# Patient Record
Sex: MALE | Race: BLACK OR AFRICAN AMERICAN | HISPANIC OR LATINO | Employment: UNEMPLOYED | URBAN - METROPOLITAN AREA
[De-identification: names, ages, dates, MRNs, and addresses within clinical notes are randomized per-mention and may not be internally consistent; named-entity substitution may affect disease eponyms.]

---

## 2017-06-30 ENCOUNTER — ALLSCRIPTS OFFICE VISIT (OUTPATIENT)
Dept: OTHER | Facility: OTHER | Age: 52
End: 2017-06-30

## 2017-06-30 DIAGNOSIS — I10 ESSENTIAL (PRIMARY) HYPERTENSION: ICD-10-CM

## 2017-06-30 DIAGNOSIS — R63.5 ABNORMAL WEIGHT GAIN: ICD-10-CM

## 2017-06-30 DIAGNOSIS — R94.5 ABNORMAL RESULTS OF LIVER FUNCTION STUDIES: ICD-10-CM

## 2017-06-30 DIAGNOSIS — E78.5 HYPERLIPIDEMIA: ICD-10-CM

## 2017-06-30 DIAGNOSIS — M48.061 SPINAL STENOSIS OF LUMBAR REGION: ICD-10-CM

## 2017-06-30 DIAGNOSIS — Z13.89 ENCOUNTER FOR SCREENING FOR OTHER DISORDER: ICD-10-CM

## 2017-06-30 DIAGNOSIS — E66.9 OBESITY: ICD-10-CM

## 2017-06-30 DIAGNOSIS — M54.50 LOW BACK PAIN: ICD-10-CM

## 2017-06-30 DIAGNOSIS — M10.9 GOUT: ICD-10-CM

## 2017-07-11 ENCOUNTER — TRANSCRIBE ORDERS (OUTPATIENT)
Dept: LAB | Facility: HOSPITAL | Age: 52
End: 2017-07-11

## 2017-07-11 ENCOUNTER — APPOINTMENT (OUTPATIENT)
Dept: LAB | Facility: HOSPITAL | Age: 52
End: 2017-07-11
Payer: COMMERCIAL

## 2017-07-11 DIAGNOSIS — I10 ESSENTIAL (PRIMARY) HYPERTENSION: ICD-10-CM

## 2017-07-11 DIAGNOSIS — M48.061 SPINAL STENOSIS OF LUMBAR REGION: ICD-10-CM

## 2017-07-11 DIAGNOSIS — E78.5 HYPERLIPIDEMIA: ICD-10-CM

## 2017-07-11 DIAGNOSIS — E66.9 OBESITY: ICD-10-CM

## 2017-07-11 DIAGNOSIS — R94.5 ABNORMAL RESULTS OF LIVER FUNCTION STUDIES: ICD-10-CM

## 2017-07-11 DIAGNOSIS — M54.50 LOW BACK PAIN: ICD-10-CM

## 2017-07-11 DIAGNOSIS — R63.5 ABNORMAL WEIGHT GAIN: ICD-10-CM

## 2017-07-11 DIAGNOSIS — M10.9 GOUT: ICD-10-CM

## 2017-07-11 DIAGNOSIS — Z13.89 ENCOUNTER FOR SCREENING FOR OTHER DISORDER: ICD-10-CM

## 2017-07-11 LAB
ALBUMIN SERPL BCP-MCNC: 3.9 G/DL (ref 3.5–5)
ALP SERPL-CCNC: 107 U/L (ref 46–116)
ALT SERPL W P-5'-P-CCNC: 72 U/L (ref 12–78)
ANION GAP SERPL CALCULATED.3IONS-SCNC: 6 MMOL/L (ref 4–13)
AST SERPL W P-5'-P-CCNC: 33 U/L (ref 5–45)
BASOPHILS # BLD AUTO: 0.01 THOUSANDS/ΜL (ref 0–0.1)
BASOPHILS NFR BLD AUTO: 0 % (ref 0–1)
BILIRUB SERPL-MCNC: 0.48 MG/DL (ref 0.2–1)
BUN SERPL-MCNC: 16 MG/DL (ref 5–25)
CALCIUM SERPL-MCNC: 9.1 MG/DL (ref 8.3–10.1)
CHLORIDE SERPL-SCNC: 101 MMOL/L (ref 100–108)
CHOLEST SERPL-MCNC: 239 MG/DL (ref 50–200)
CO2 SERPL-SCNC: 31 MMOL/L (ref 21–32)
CREAT SERPL-MCNC: 0.91 MG/DL (ref 0.6–1.3)
EOSINOPHIL # BLD AUTO: 0.24 THOUSAND/ΜL (ref 0–0.61)
EOSINOPHIL NFR BLD AUTO: 3 % (ref 0–6)
ERYTHROCYTE [DISTWIDTH] IN BLOOD BY AUTOMATED COUNT: 13.2 % (ref 11.6–15.1)
EST. AVERAGE GLUCOSE BLD GHB EST-MCNC: 128 MG/DL
GFR SERPL CREATININE-BSD FRML MDRD: >60 ML/MIN/1.73SQ M
GLUCOSE P FAST SERPL-MCNC: 104 MG/DL (ref 65–99)
HBA1C MFR BLD: 6.1 % (ref 4.2–6.3)
HCT VFR BLD AUTO: 48.5 % (ref 36.5–49.3)
HDLC SERPL-MCNC: 30 MG/DL (ref 40–60)
HGB BLD-MCNC: 17.1 G/DL (ref 12–17)
LDLC SERPL DIRECT ASSAY-MCNC: 140 MG/DL (ref 0–100)
LYMPHOCYTES # BLD AUTO: 2.07 THOUSANDS/ΜL (ref 0.6–4.47)
LYMPHOCYTES NFR BLD AUTO: 25 % (ref 14–44)
MCH RBC QN AUTO: 31.8 PG (ref 26.8–34.3)
MCHC RBC AUTO-ENTMCNC: 35.3 G/DL (ref 31.4–37.4)
MCV RBC AUTO: 90 FL (ref 82–98)
MONOCYTES # BLD AUTO: 0.93 THOUSAND/ΜL (ref 0.17–1.22)
MONOCYTES NFR BLD AUTO: 11 % (ref 4–12)
NEUTROPHILS # BLD AUTO: 5.12 THOUSANDS/ΜL (ref 1.85–7.62)
NEUTS SEG NFR BLD AUTO: 61 % (ref 43–75)
NRBC BLD AUTO-RTO: 0 /100 WBCS
PLATELET # BLD AUTO: 203 THOUSANDS/UL (ref 149–390)
PMV BLD AUTO: 10.8 FL (ref 8.9–12.7)
POTASSIUM SERPL-SCNC: 4 MMOL/L (ref 3.5–5.3)
PROT SERPL-MCNC: 7.7 G/DL (ref 6.4–8.2)
RBC # BLD AUTO: 5.37 MILLION/UL (ref 3.88–5.62)
SODIUM SERPL-SCNC: 138 MMOL/L (ref 136–145)
TRIGL SERPL-MCNC: 436 MG/DL
URATE SERPL-MCNC: 8.7 MG/DL (ref 4.2–8)
WBC # BLD AUTO: 8.38 THOUSAND/UL (ref 4.31–10.16)

## 2017-07-11 PROCEDURE — 85025 COMPLETE CBC W/AUTO DIFF WBC: CPT

## 2017-07-11 PROCEDURE — 80053 COMPREHEN METABOLIC PANEL: CPT

## 2017-07-11 PROCEDURE — 83036 HEMOGLOBIN GLYCOSYLATED A1C: CPT

## 2017-07-11 PROCEDURE — 83721 ASSAY OF BLOOD LIPOPROTEIN: CPT

## 2017-07-11 PROCEDURE — 36415 COLL VENOUS BLD VENIPUNCTURE: CPT

## 2017-07-11 PROCEDURE — 80061 LIPID PANEL: CPT

## 2017-07-11 PROCEDURE — 84550 ASSAY OF BLOOD/URIC ACID: CPT

## 2017-11-20 ENCOUNTER — HOSPITAL ENCOUNTER (EMERGENCY)
Facility: HOSPITAL | Age: 52
Discharge: HOME | End: 2017-11-20
Attending: EMERGENCY MEDICINE | Admitting: EMERGENCY MEDICINE
Payer: COMMERCIAL

## 2017-11-20 VITALS
OXYGEN SATURATION: 97 % | RESPIRATION RATE: 18 BRPM | HEART RATE: 99 BPM | DIASTOLIC BLOOD PRESSURE: 115 MMHG | SYSTOLIC BLOOD PRESSURE: 159 MMHG

## 2017-11-20 DIAGNOSIS — M10.9 ACUTE GOUT: Primary | ICD-10-CM

## 2017-11-20 DIAGNOSIS — M25.531 RIGHT WRIST PAIN: ICD-10-CM

## 2017-11-20 DIAGNOSIS — M25.521 RIGHT ELBOW PAIN: ICD-10-CM

## 2017-11-20 DIAGNOSIS — M25.571 RIGHT ANKLE PAIN: ICD-10-CM

## 2017-11-20 DIAGNOSIS — M79.641 RIGHT HAND PAIN: ICD-10-CM

## 2017-11-20 PROCEDURE — 99283 EMERGENCY DEPT VISIT LOW MDM: CPT

## 2017-11-20 RX ORDER — IBUPROFEN 400 MG/1
800 TABLET ORAL EVERY 8 HOURS PRN
Qty: 20 TABLET | Refills: 0 | Status: SHIPPED | OUTPATIENT
Start: 2017-11-20 | End: 2018-03-22

## 2017-11-20 RX ORDER — COLCHICINE 0.6 MG/1
0.6 TABLET ORAL ONCE
Qty: 1 TABLET | Refills: 0 | Status: SHIPPED | OUTPATIENT
Start: 2017-11-20 | End: 2018-03-22

## 2017-11-20 RX ORDER — COLCHICINE 0.6 MG/1
1.2 TABLET ORAL ONCE
Status: COMPLETED | OUTPATIENT
Start: 2017-11-20 | End: 2017-11-20

## 2017-11-20 RX ADMIN — COLCHICINE 1.2 MG: 0.6 TABLET, FILM COATED ORAL at 21:58

## 2017-11-21 NOTE — DISCHARGE INSTRUCTIONS
Gota   LO QUE NECESITA SABER:   La gota es un tipo de artritis que causa un intenso dolor y rigidez en las articulaciones  El dolor de gota aguda empieza repentinamente, se empeora rápidamente y cesa por jacobo Yair Slovak  La gota aguda puede llegar a ser Bhumika Cutter y causar daño permanente en las articulaciones  INSTRUCCIONES SOBRE EL LIA HOSPITALARIA:   Regrese a la phillip de emergencias si:   · Usted tiene dolor intenso en jaida o más articulaciones que no puede tolerar  · Usted tiene fiebre o enrojecimiento que se propaga más allá del área de la articulación  Pregúntele a jacobo Tierney Punch vitaminas y minerales son adecuados para usted  · Usted tiene síntomas nuevos, nu sarpullido, después de comenzar el tratamiento para la gota  · El dolor e inflamación en jacobo articulación no se desaparece, aún después del tratamiento  · Usted no está orinando tanto o con la frecuencia con que suele hacerlo  · Usted tiene problemas para oleksandr mayi medicamentos para la gota  · Usted tiene preguntas o inquietudes acerca de jacobo condición o cuidado  Medicamentos:  Es posible que usted necesite alguno de los siguientes:  · Un medicamento con receta para el dolor  podrían ser Urvashi Cammie  Pregunte al médico cómo debe oleksandr lara medicamento de forma zapien  Algunos medicamentos recetados para el dolor contienen acetaminofén  No tome otros medicamentos que contengan acetaminofén sin consultarlo con jacobo médico  Demasiado acetaminofeno puede causar daño al hígado  Los medicamentos recetados para el dolor podrían causar estreñimiento  Pregunte a jacobo médico nu prevenir o tratar estreñimiento  · AINEs (Analgésicos antiinflamatorios no esteroides) nu el ibuprofeno, ayudan a disminuir la inflamación, el dolor y la [place]  [place] medicamento esta disponible con o sin jaida receta médica  Los AINEs pueden causar sangrado estomacal o problemas renales en ciertas personas   Si usted gabby un medicamento anticoagulante, siempre pregúntele a jacobo médico si los John Li son seguros para usted  Siempre radha la etiqueta de lara medicamento y [place] instrucciones  · El medicamento para la gota  disminuye el dolor e inflamación en las articulaciones  También se puede administrar para prevenir nuevos ataques de gota  · Esteroides  reducen la inflamación y pueden ayudarlo con la rigidez y el dolor en mayi articulaciones nate los ataques de [place]  · El medicamento para ácido úrico  puede administrarse para reducir la cantidad de ácido úrico que jacobo cuerpo produce  Algunos medicamentos pueden ayudar a eliminar más ácido úrico al [place]  · [place] mayi medicamentos nu se le haya indicado  Consulte con jacobo médico si usted william que jacobo medicamento no le está ayudando o si presenta efectos secundarios  Infórmele si es alérgico a cualquier medicamento  Mantenga jaida lista actualizada de los [place], las vitaminas y los productos herbales que gabby  Incluya los siguientes datos de los medicamentos: cantidad, frecuencia y motivo de administración  Traiga con usted la lista o los envases de la píldoras a mayi citas de seguimiento  Lleve la lista de los medicamentos con usted en fela de jaida emergencia  Acuda a mayi consultas de control con jacobo médico según le indicaron  Anote mayi preguntas para que se acuerde de hacerlas nate mayi visitas  Controle la gota:   · Descanse jacobo articulación adolorida para que pueda recuperarse  Jacobo médico podría recomendarle que use muletas o un caminador si la articulación afectada está en jaida pierna  · Aplique hielo a jacobo articulación  El hielo disminuye el dolor y la inflamación  Use un paquete de hielo o ponga hielo molido dentro de [employer]  [place] la bolsa o el paquete de hielo con jaida toalla antes de aplicarla en la articulación adolorida  Aplique hielo nate 15 a 20 minutos por hora o según indicaciones  · Eleve jacobo articulación  [place] le ayudará a disminuir la inflamación y el dolor   Eleve jacobo articulación por encima del nivel del corazón con la frecuencia que pueda  [place] jacobo articulación adolorida sobre almohadas para mantenerla cómodamente por encima del nivel del corazón  · Vaya a fisioterapia según le indicaron  Un fisioterapeuta le puede enseñar ejercicios para mejorar la flexibilidad y el rango de [place]  Prevenga ataques de gota:   · No consuma alimentos altos en purinas  Estos alimentos incluyen:sunitha, mariscos, espárragos, espinacas, coliflor, y algunos tipos de legumbres  Puede que los [employer] indiquen que coma más productos lácteos bajos en grasa, nu el yogur  Los productos lácteos pueden disminuir el riesgo de presentar ataques de [place]  La vitamina C y el café también puedan ayudar  Jacobo médico o un dietista puede ayudarle a crear un plan de comidas  · [employer] líquidos nu se le indique  Los líquidos nu el agua ayudan a eliminar el ácido úrico del cuerpo  Pregunte cuánto líquido debe oleksandr cada día y cuáles líquidos son los más adecuados para usted  · Controle jacobo peso  Bajar de peso puede disminuir la cantidad de ácido úrico en jacobo cuerpo  El ejercicio lo puede ayudar con la pérdida de [place]  Consulte con jacobo médico acerca de los ejercicios adecuados para usted  · Controle mayi niveles de azúcar en la ismael si usted tiene diabetes  Mantenga el nivel de azúcar en jacobo ismael en un margen normal  [place] puede ayudar a prevenir ataques de gota  · Limite o no consuma bebidas alcohólicas, según indicaciones  El alcohol puede provocar un ataque de [place]  El alcohol también aumenta el riesgo de presentar deshidratación  Pregúntele a jacobo médico si usted puede oleksandr alcohol  © 2017 [facility] Information is for End User's use only and may not be sold, redistributed or otherwise used for commercial purposes  All illustrations and images included in CareNotes® are the copyrighted property of A D A M , Inc  or Steve Jc    Esta información es sólo para uso en educación  Jacobo intención no es darle un consejo médico sobre enfermedades o tratamientos  Colsulte con jacobo Bishop Cranker farmacéutico antes de seguir cualquier régimen médico para saber si es seguro y efectivo para usted  Gout   WHAT YOU NEED TO KNOW:   Gout is a form of arthritis that causes severe joint pain, redness, swelling, and stiffness  Acute gout pain starts suddenly, gets worse quickly, and stops on its own  Acute gout can become chronic and cause permanent damage to the joints  DISCHARGE INSTRUCTIONS:   Return to the emergency department if:   · You have severe pain in one or more of your joints that you cannot tolerate  · You have a fever or redness that spreads beyond the joint area  Contact your healthcare provider if:   · You have new symptoms, such as a rash, after you start gout treatment  · Your joint pain and swelling do not go away, even after treatment  · You are not urinating as much or as often as you usually do  · You have trouble taking your gout medicines  · You have questions or concerns about your condition or care  Medicines: You may need any of the following:  · Prescription pain medicine  may be given  Ask your healthcare provider how to take this medicine safely  Some prescription pain medicines contain acetaminophen  Do not take other medicines that contain acetaminophen without talking to your healthcare provider  Too much acetaminophen may cause liver damage  Prescription pain medicine may cause constipation  Ask your healthcare provider how to prevent or treat constipation  · NSAIDs , such as ibuprofen, help decrease swelling, pain, and fever  This medicine is available with or without a doctor's order  NSAIDs can cause stomach bleeding or kidney problems in certain people  If you take blood thinner medicine, always ask your healthcare provider if NSAIDs are safe for you  Always read the medicine label and follow directions      · Gout medicine decreases joint pain and swelling  It may also be given to prevent new gout attacks  · Steroids  reduce inflammation and can help your joint stiffness and pain during gout attacks  · Uric acid medicine  may be given to reduce the amount of uric acid your body makes  Some medicines may help you pass more uric acid when you urinate  · Take your medicine as directed  Contact your healthcare provider if you think your medicine is not helping or if you have side effects  Tell him or her if you are allergic to any medicine  Keep a list of the medicines, vitamins, and herbs you take  Include the amounts, and when and why you take them  Bring the list or the pill bottles to follow-up visits  Carry your medicine list with you in case of an emergency  Follow up with your healthcare provider as directed:  Write down your questions so you remember to ask them during your visits  Manage gout:   · Rest your painful joint so it can heal   Your healthcare provider may recommend crutches or a walker if the affected joint is in a leg  · Apply ice to your joint  Ice decreases pain and swelling  Use an ice pack, or put crushed ice in a plastic bag  Cover the ice pack or bag with a towel before you apply it to your painful joint  Apply ice for 15 to 20 minutes every hour, or as directed  · Elevate your joint  Elevation helps reduce swelling and pain  Raise your joint above the level of your heart as often as you can  Prop your painful joint on pillows to keep it above your heart comfortably  · Go to physical therapy if directed  A physical therapist can teach you exercises to improve flexibility and range of motion  Prevent gout attacks:   · Do not eat high-purine foods  These foods include meats, seafood, asparagus, spinach, cauliflower, and some types of beans  Healthcare providers may tell you to eat more low-fat milk products, such as yogurt  Milk products may decrease your risk for gout attacks   Vitamin C and coffee may also help  Your healthcare provider or dietitian can help you create a meal plan  · Drink more liquids as directed  Liquids such as water help remove uric acid from your body  Ask how much liquid to drink each day and which liquids are best for you  · Manage your weight  Weight loss may decrease the amount of uric acid in your body  Exercise can help you lose weight  Talk to your healthcare provider about the best exercises for you  · Control your blood sugar level if you have diabetes  Keep your blood sugar level in a normal range  This can help prevent gout attacks  · Limit or do not drink alcohol as directed  Alcohol can trigger a gout attack  Alcohol also increases your risk for dehydration  Ask your healthcare provider if alcohol is safe for you  © 2017 [address]  Information is for End User's use only and may not be sold, redistributed or otherwise used for commercial purposes  All illustrations and images included in CareNotes® are the copyrighted property of A D A M , Inc  or Steve Jc  The above information is an  only  It is not intended as medical advice for individual conditions or treatments  Talk to your doctor, nurse or pharmacist before following any medical regimen to see if it is safe and effective for you

## 2017-11-21 NOTE — ED ATTENDING ATTESTATION
Danni Pratt MD, saw and evaluated the patient  I have discussed the patient with the resident/non-physician practitioner and agree with the resident's/non-physician practitioner's findings, Plan of Care, and MDM as documented in the resident's/non-physician practitioner's note, except where noted  All available labs and Radiology studies were reviewed  At this point I agree with the current assessment done in the Emergency Department  I have conducted an independent evaluation of this patient a history and physical is as follows:     60-year-old male presenting with right wrist pain  Patient has had pain for the last several days, pain in his wrist and MCP  Patient with history of gout  No fevers or chills  No trauma  No numbness, tingling, or weakness  On exam the patient has tophaceous swelling and tenderness with no evidence of cellulitis  He is neurovascularly intact  Impression: Gout  Agree with documentation    Critical Care Time  CritCare Time

## 2017-11-21 NOTE — ED PROVIDER NOTES
History  Chief Complaint   Patient presents with    Wrist Pain     Pt c/o right wrist pain and some right ankle pain that started on Friday,  Pt states that he has arthritis and gout     69-year-old male with history of gout and osteoarthritis presents for evaluation of right hand, right wrist, right elbow and right ankle pain which has been worsening for the past 3 days  Patient states that he has been taking 800 mg ibuprofen and began taking allopurinol 2 days ago with no improvement in his symptoms  Patient took a single dose of his wife's Percocet as well with minimal improvement  Patient states that there has been warmth over the joint, but denies any fevers or chills  No nausea, vomiting, chest pain, shortness of breath or new back pain  Patient has been tolerating a his usual diet  He reports no dietary changes  No recent injuries  History provided by:  Patient  Hand Pain   Location:  Right hand, right wrist, right elbow, right ankle  Quality:  Throbbing, aching  Severity:  Severe  Onset quality:  Gradual  Duration:  3 days  Timing:  Constant  Progression:  Worsening  Chronicity:  Recurrent  Context:  No injury, spontaneous  Relieved by:  Nothing  Worsened by: Movement  Ineffective treatments:  Ibuprofen, allopurinol, percocet  Associated symptoms: no abdominal pain, no chest pain, no congestion, no cough, no diarrhea, no fatigue, no fever, no headaches, no myalgias, no nausea, no rhinorrhea, no shortness of breath, no sore throat and no vomiting    Risk factors:  Gout, osteoarthritis      Prior to Admission Medications   Prescriptions Last Dose Informant Patient Reported? Taking? amLODIPine (NORVASC) 2 5 mg tablet   Yes No   Sig: Take 2 5 mg by mouth daily  indomethacin (INDOCIN) 25 mg capsule   Yes No   Sig: Take 25 mg by mouth 3 (three) times a day with meals  lisinopril-hydrochlorothiazide (PRINZIDE,ZESTORETIC) 20-25 MG per tablet   Yes No   Sig: Take 1 tablet by mouth daily  omeprazole (PriLOSEC) 40 MG capsule   Yes No   Sig: Take 40 mg by mouth daily  oxyCODONE-acetaminophen (PERCOCET) 5-325 mg per tablet   No No   Sig: Take 1 tablet by mouth every 6 (six) hours as needed for moderate pain  Max Daily Amount: 4 tablets      Facility-Administered Medications: None       Past Medical History:   Diagnosis Date    Arthritis     GERD (gastroesophageal reflux disease)     Gout     Hypercholesteremia     Hypertension        History reviewed  No pertinent surgical history  History reviewed  No pertinent family history  I have reviewed and agree with the history as documented  Social History   Substance Use Topics    Smoking status: Never Smoker    Smokeless tobacco: Never Used    Alcohol use Yes      Comment: occassionally        Review of Systems   Constitutional: Negative for appetite change, diaphoresis, fatigue and fever  HENT: Negative for congestion, rhinorrhea and sore throat  Respiratory: Negative for cough, chest tightness and shortness of breath  Cardiovascular: Negative for chest pain, palpitations and leg swelling  Gastrointestinal: Negative for abdominal pain, constipation, diarrhea, nausea and vomiting  Genitourinary: Negative for difficulty urinating, dysuria, frequency and hematuria  Musculoskeletal: Positive for arthralgias and joint swelling  Negative for myalgias, neck pain and neck stiffness  Skin: Negative for pallor  Neurological: Negative for syncope, weakness and headaches  All other systems reviewed and are negative        Physical Exam  ED Triage Vitals [11/20/17 2054]   Temp Pulse Respirations Blood Pressure SpO2   -- 99 18 (!) 159/115 97 %      Temp src Heart Rate Source Patient Position - Orthostatic VS BP Location FiO2 (%)   -- Monitor Sitting Left arm --      Pain Score       Worst Possible Pain           Orthostatic Vital Signs  Vitals:    11/20/17 2054   BP: (!) 159/115   Pulse: 99   Patient Position - Orthostatic VS: Sitting       Physical Exam   Constitutional: He is oriented to person, place, and time  He appears well-developed and well-nourished  Non-toxic appearance  No distress  HENT:   Head: Normocephalic and atraumatic  Eyes: EOM are normal  Pupils are equal, round, and reactive to light  Neck: Normal range of motion  No tracheal deviation present  No thyromegaly present  Cardiovascular: Normal rate, regular rhythm, normal heart sounds and intact distal pulses  Pulmonary/Chest: Effort normal and breath sounds normal    Abdominal: Soft  Bowel sounds are normal  He exhibits no distension  There is no tenderness  Musculoskeletal:   Mild erythema, swelling and warmth over the 2nd MCP joint of the right hand  Mild swelling and erythema of the right wrist   Slightly decreased range of motion of the right hand and wrist secondary to pain  Full range of motion of the right elbow with no overlying skin changes or palpable swelling  Mild swelling, erythema and decreased range of motion of the right ankle extending down to the dorsum of the right foot  No overlying cellulitis in any of the sites  Lymphadenopathy:     He has no cervical adenopathy  Neurological: He is alert and oriented to person, place, and time  Skin: Skin is warm and dry  He is not diaphoretic  Nursing note and vitals reviewed        ED Medications  Medications   colchicine (COLCRYS) tablet 1 2 mg (1 2 mg Oral Given 11/20/17 2158)       Diagnostic Studies  Results Reviewed     None                 No orders to display         Procedures  Procedures      Phone Consults  ED Phone Contact    ED Course  ED Course                                MDM  Number of Diagnoses or Management Options  Acute gout: new and requires workup  Right ankle pain: new and requires workup  Right elbow pain: new and requires workup  Right hand pain: new and requires workup  Right wrist pain: new and requires workup  Diagnosis management comments: 60-year-old male presents for evaluation of arthralgias for the past 3 days  Exam consistent with acute gout  Patient has been taking his allopurinol on as needed basis with no improvement in his symptoms after starting 2 days ago  Patient denies any improvement with ibuprofen at home  Will attempt treatment with colchicine  PCP follow-up for further management of patient's gout  Patient Progress  Patient progress: stable    CritCare Time    Disposition  Final diagnoses:   Acute gout   Right ankle pain   Right wrist pain   Right hand pain   Right elbow pain     Time reflects when diagnosis was documented in both MDM as applicable and the Disposition within this note     Time User Action Codes Description Comment    11/20/2017  9:37 PM Kaerem Pore Add [M10 9] Acute gout     11/20/2017  9:37 PM Kareem Pore Add [M25 571] Right ankle pain     11/20/2017  9:37 PM Kareem Pore Add [M25 531] Right wrist pain     11/20/2017  9:37 PM Kareem Pore Add [D30 669] Right hand pain     11/20/2017  9:38 PM Era Shah Add [M25 521] Right elbow pain       ED Disposition     ED Disposition Condition Comment    Discharge  Rico Jennings discharge to home/self care      Condition at discharge: Stable        Follow-up Information     Follow up With Specialties Details Why Contact Info Additional Information    Yolanda Joyner MD Family Medicine Schedule an appointment as soon as possible for a visit for further management of your gout [address]  [address] [address] [address]  [address] [facility] [address]  [phone]       [address] [place] [address] Emergency Department Emergency Medicine Go to If symptoms worsen [address] [facility] [address] [facility] ED, [address], [address]        Discharge Medication List as of 11/20/2017  9:39 PM      START taking these medications    Details   colchicine (COLCRYS) 0 6 mg tablet Take 1 tablet by mouth once for 1 dose, Starting Mon 11/20/2017, Print         CONTINUE these medications which have NOT CHANGED    Details   amLODIPine (NORVASC) 2 5 mg tablet Take 2 5 mg by mouth daily  , Until Discontinued, Historical Med      indomethacin (INDOCIN) 25 mg capsule Take 25 mg by mouth 3 (three) times a day with meals  , Until Discontinued, Historical Med      lisinopril-hydrochlorothiazide (PRINZIDE,ZESTORETIC) 20-25 MG per tablet Take 1 tablet by mouth daily  , Until Discontinued, Historical Med      omeprazole (PriLOSEC) 40 MG capsule Take 40 mg by mouth daily  , Until Discontinued, Historical Med      oxyCODONE-acetaminophen (PERCOCET) 5-325 mg per tablet Take 1 tablet by mouth every 6 (six) hours as needed for moderate pain  Max Daily Amount: 4 tablets, Starting 4/27/2016, Until Discontinued, Print           No discharge procedures on file  ED Provider  Attending physically available and evaluated Analisa LAY managed the patient along with the ED Attending      Electronically Signed by         Alan Humphreys MD  Resident  11/21/17 5660

## 2017-12-01 ENCOUNTER — HOSPITAL ENCOUNTER (EMERGENCY)
Facility: HOSPITAL | Age: 52
Discharge: HOME | End: 2017-12-01
Attending: EMERGENCY MEDICINE | Admitting: EMERGENCY MEDICINE
Payer: COMMERCIAL

## 2017-12-01 VITALS
HEIGHT: 68 IN | OXYGEN SATURATION: 98 % | DIASTOLIC BLOOD PRESSURE: 60 MMHG | HEART RATE: 88 BPM | TEMPERATURE: 98.4 F | SYSTOLIC BLOOD PRESSURE: 117 MMHG | WEIGHT: 254 LBS | RESPIRATION RATE: 18 BRPM | BODY MASS INDEX: 38.49 KG/M2

## 2017-12-01 DIAGNOSIS — M10.041 ACUTE IDIOPATHIC GOUT OF RIGHT HAND: Primary | ICD-10-CM

## 2017-12-01 DIAGNOSIS — M10.031 ACUTE IDIOPATHIC GOUT OF RIGHT WRIST: ICD-10-CM

## 2017-12-01 PROCEDURE — 99283 EMERGENCY DEPT VISIT LOW MDM: CPT

## 2017-12-01 RX ORDER — ACETAMINOPHEN 500 MG
1000 TABLET ORAL EVERY 6 HOURS PRN
Qty: 30 TABLET | Refills: 0 | Status: SHIPPED | OUTPATIENT
Start: 2017-12-01 | End: 2019-12-04

## 2017-12-01 RX ORDER — PREDNISONE 20 MG/1
40 TABLET ORAL DAILY
Qty: 12 TABLET | Refills: 0 | Status: SHIPPED | OUTPATIENT
Start: 2017-12-01 | End: 2017-12-07

## 2017-12-01 RX ORDER — MORPHINE SULFATE 15 MG/1
15 TABLET ORAL EVERY 6 HOURS PRN
Qty: 7 TABLET | Refills: 0 | Status: SHIPPED | OUTPATIENT
Start: 2017-12-01 | End: 2018-03-22

## 2017-12-01 RX ORDER — ACETAMINOPHEN 325 MG/1
975 TABLET ORAL ONCE
Status: COMPLETED | OUTPATIENT
Start: 2017-12-01 | End: 2017-12-01

## 2017-12-01 RX ORDER — ALLOPURINOL 300 MG/1
300 TABLET ORAL DAILY
COMMUNITY
End: 2018-02-06 | Stop reason: SDUPTHER

## 2017-12-01 RX ORDER — PREDNISONE 20 MG/1
60 TABLET ORAL ONCE
Status: COMPLETED | OUTPATIENT
Start: 2017-12-01 | End: 2017-12-01

## 2017-12-01 RX ORDER — OXYCODONE HYDROCHLORIDE 10 MG/1
10 TABLET ORAL ONCE
Status: COMPLETED | OUTPATIENT
Start: 2017-12-01 | End: 2017-12-01

## 2017-12-01 RX ADMIN — PREDNISONE 60 MG: 20 TABLET ORAL at 03:45

## 2017-12-01 RX ADMIN — ACETAMINOPHEN 975 MG: 325 TABLET, FILM COATED ORAL at 03:45

## 2017-12-01 RX ADMIN — OXYCODONE HYDROCHLORIDE 10 MG: 10 TABLET ORAL at 03:44

## 2017-12-01 NOTE — DISCHARGE INSTRUCTIONS
Gota   LO QUE NECESITA SABER:   La gota es un tipo de artritis que causa un intenso dolor y rigidez en las articulaciones  El dolor de gota aguda empieza repentinamente, se empeora rápidamente y cesa por jacobo Sharlotte Gauss  La gota aguda puede llegar a ser Eppie Aliment y causar daño permanente en las articulaciones  INSTRUCCIONES SOBRE EL LIA HOSPITALARIA:   Regrese a la phillip de emergencias si:   · Usted tiene dolor intenso en jaida o más articulaciones que no puede tolerar  · Usted tiene fiebre o enrojecimiento que se propaga más allá del área de la articulación  Pregúntele a jacobo Blair Forester vitaminas y minerales son adecuados para usted  · Usted tiene síntomas nuevos, nu sarpullido, después de comenzar el tratamiento para la gota  · El dolor e inflamación en jacobo articulación no se desaparece, aún después del tratamiento  · Usted no está orinando tanto o con la frecuencia con que suele hacerlo  · Usted tiene problemas para oleksandr mayi medicamentos para la gota  · Usted tiene preguntas o inquietudes acerca de jacobo condición o cuidado  Medicamentos:  Es posible que usted necesite alguno de los siguientes:  · Un medicamento con receta para el dolor  podrían ser Gerhardt Donna  Pregunte al médico cómo debe oleksandr lara medicamento de forma zapien  Algunos medicamentos recetados para el dolor contienen acetaminofén  No tome otros medicamentos que contengan acetaminofén sin consultarlo con jacobo médico  Demasiado acetaminofeno puede causar daño al hígado  Los medicamentos recetados para el dolor podrían causar estreñimiento  Pregunte a jacobo médico nu prevenir o tratar estreñimiento  · AINEs (Analgésicos antiinflamatorios no esteroides) nu el ibuprofeno, ayudan a disminuir la inflamación, el dolor y la [place]  [place] medicamento esta disponible con o sin jaida receta médica  Los AINEs pueden causar sangrado estomacal o problemas renales en ciertas personas   Si usted gabby un medicamento anticoagulante, siempre pregúntele a jacobo médico si los [place] son seguros para usted  Siempre radha la etiqueta de lara medicamento y [place] instrucciones  · El medicamento para la gota  disminuye el dolor e inflamación en las articulaciones  También se puede administrar para prevenir nuevos ataques de gota  · Esteroides  reducen la inflamación y pueden ayudarlo con la rigidez y el dolor en mayi articulaciones nate los ataques de [place]  · El medicamento para ácido úrico  puede administrarse para reducir la cantidad de ácido úrico que jacobo cuerpo produce  Algunos medicamentos pueden ayudar a eliminar más ácido úrico al [place]  · [place] mayi medicamentos nu se le haya indicado  Consulte con jacobo médico si usted william que jacobo medicamento no le está ayudando o si presenta efectos secundarios  Infórmele si es alérgico a cualquier medicamento  Mantenga jaida lista actualizada de los [employer], las vitaminas y los productos herbales que gabby  Incluya los siguientes datos de los medicamentos: cantidad, frecuencia y motivo de administración  Traiga con usted la lista o los envases de la píldoras a mayi citas de seguimiento  Lleve la lista de los medicamentos con usted en fela de jaida emergencia  Acuda a mayi consultas de control con jacobo médico según le indicaron  Anote mayi preguntas para que se acuerde de hacerlas nate mayi visitas  Controle la gota:   · Descanse jacobo articulación adolorida para que pueda recuperarse  Jacobo médico podría recomendarle que use muletas o un caminador si la articulación afectada está en jaida pierna  · Aplique hielo a jacobo articulación  El hielo disminuye el dolor y la inflamación  Use un paquete de hielo o ponga hielo molido dentro de [employer]  [place] la bolsa o el paquete de hielo con jaida toalla antes de aplicarla en la articulación adolorida  Aplique hielo nate 15 a 20 minutos por hora o según indicaciones  · Eleve jacobo articulación  Ada le ayudará a disminuir la inflamación y el dolor   Eleve jacobo articulación por encima del nivel del corazón con la frecuencia que pueda  [place] jacobo articulación adolorida sobre almohadas para mantenerla cómodamente por encima del nivel del corazón  · Vaya a fisioterapia según le indicaron  Un fisioterapeuta le puede enseñar ejercicios para mejorar la flexibilidad y el rango de [place]  Prevenga ataques de gota:   · No consuma alimentos altos en purinas  Estos alimentos incluyen:sunitha, mariscos, espárragos, espinacas, coliflor, y algunos tipos de legumbres  Puede que los [employer] indiquen que coma más productos lácteos bajos en grasa, nu el yogur  Los productos lácteos pueden disminuir el riesgo de presentar ataques de [place]  La vitamina C y el café también puedan ayudar  Jacobo médico o un dietista puede ayudarle a crear un plan de comidas  · [employer] líquidos nu se le indique  Los líquidos nu el agua ayudan a eliminar el ácido úrico del cuerpo  Pregunte cuánto líquido debe oleksandr cada día y cuáles líquidos son los más adecuados para usted  · Controle jacobo peso  Bajar de peso puede disminuir la cantidad de ácido úrico en jacobo cuerpo  El ejercicio lo puede ayudar con la pérdida de [place]  Consulte con jacobo médico acerca de los ejercicios adecuados para usted  · Controle mayi niveles de azúcar en la ismael si usted tiene diabetes  Mantenga el nivel de azúcar en jacobo ismael en un margen normal  [place] puede ayudar a prevenir ataques de gota  · Limite o no consuma bebidas alcohólicas, según indicaciones  El alcohol puede provocar un ataque de [place]  El alcohol también aumenta el riesgo de presentar deshidratación  Pregúntele a jacobo médico si usted puede oleksandr alcohol  © 2017 [address]  Information is for End User's use only and may not be sold, redistributed or otherwise used for commercial purposes  All illustrations and images included in CareNotes® are the copyrighted property of A D A M , Inc  or [facility]    Esta información es sólo para uso en educación  Jacobo intención no es darle un consejo médico sobre enfermedades o tratamientos  Colsulte con jacobo Kekaha Roa farmacéutico antes de seguir cualquier régimen médico para saber si es seguro y efectivo para usted

## 2017-12-01 NOTE — ED PROVIDER NOTES
History  Chief Complaint   Patient presents with    Gout Pain     Pt  reports he has been struggling with gout in his right arm and hand for over 2 weeks now  Unable to control pain with prescribed medications  This is a 47 y o  old male who presents to the ED for evaluation of hand and wrist pain  Patient has a known history of gout and presents today with pain and swelling of his right hand and right wrist   Was seen here 2 weeks ago and diagnosed with an acute gout flare  Was given colchicine reports having minimal relief from his symptoms from there  States the pain persists  He has used Percocet which was prescribed his wife helped the pain  He states the pain was in his right index finger MCP joint, however today it is over the medial wrist   There is no overlying joint redness  There is chronic swelling Mr  due to recurrent gout flares  He is managed with allopurinol as an outpatient  Otherwise, patient denies fevers, chills, night sweats, cough, congestion, rhinorrhea, CP, dyspnea, abdominal pain, nausea, vomiting, diarrhea, constipation, urinary symptoms, leg pain or swelling  Prior to Admission Medications   Prescriptions Last Dose Informant Patient Reported? Taking?   allopurinol (ZYLOPRIM) 300 mg tablet   Yes Yes   Sig: Take 300 mg by mouth daily   amLODIPine (NORVASC) 2 5 mg tablet   Yes Yes   Sig: Take 2 5 mg by mouth daily  colchicine (COLCRYS) 0 6 mg tablet   No Yes   Sig: Take 1 tablet by mouth once for 1 dose   ibuprofen (MOTRIN) 400 mg tablet   No Yes   Sig: Take 2 tablets by mouth every 8 (eight) hours as needed for mild pain   indomethacin (INDOCIN) 25 mg capsule   Yes Yes   Sig: Take 25 mg by mouth 3 (three) times a day with meals  lisinopril-hydrochlorothiazide (PRINZIDE,ZESTORETIC) 20-25 MG per tablet   Yes Yes   Sig: Take 1 tablet by mouth daily  omeprazole (PriLOSEC) 40 MG capsule   Yes Yes   Sig: Take 40 mg by mouth daily        Facility-Administered Medications: None     Past Medical History:   Diagnosis Date    Arthritis     GERD (gastroesophageal reflux disease)     Gout     Hypercholesteremia     Hypertension      History reviewed  No pertinent surgical history  History reviewed  No pertinent family history  I have reviewed and agree with the history as documented  Social History   Substance Use Topics    Smoking status: Never Smoker    Smokeless tobacco: Never Used    Alcohol use Yes      Comment: occassionally      Review of Systems   Constitutional: Negative for chills, fatigue, fever and unexpected weight change  HENT: Negative for congestion, rhinorrhea and sore throat  Eyes: Negative for redness and visual disturbance  Respiratory: Negative for cough and shortness of breath  Cardiovascular: Negative for chest pain and leg swelling  Gastrointestinal: Negative for abdominal pain, constipation, diarrhea, nausea and vomiting  Endocrine: Negative for cold intolerance and heat intolerance  Genitourinary: Negative for dysuria, frequency and urgency  Musculoskeletal: Positive for arthralgias and joint swelling  Negative for back pain  Skin: Negative for rash  Neurological: Negative for dizziness, syncope and numbness  All other systems reviewed and are negative  Physical Exam  ED Triage Vitals [12/01/17 0302]   Temperature Pulse Respirations Blood Pressure SpO2   98 4 °F (36 9 °C) 87 20 155/88 99 %      Temp Source Heart Rate Source Patient Position - Orthostatic VS BP Location FiO2 (%)   Oral Monitor Sitting Left arm --      Pain Score       Worst Possible Pain         Physical Exam   Constitutional: He is oriented to person, place, and time  He appears well-developed and well-nourished  No distress  HENT:   Head: Normocephalic and atraumatic  Nose: Nose normal    Eyes: Conjunctivae and EOM are normal  Pupils are equal, round, and reactive to light  Neck: Normal range of motion  Neck supple     Cardiovascular: Normal rate, regular rhythm and normal heart sounds  Exam reveals no gallop  No murmur heard  Pulmonary/Chest: Effort normal and breath sounds normal  No respiratory distress  He has no wheezes  He has no rales  Abdominal: Soft  Bowel sounds are normal  He exhibits no distension and no mass  There is no tenderness  There is no rebound and no guarding  Musculoskeletal: Normal range of motion  He exhibits tenderness (R wrist)  He exhibits no edema or deformity  Minimal warmth  No overlying redness or fluctuance  Lymphadenopathy:     He has no cervical adenopathy  Neurological: He is alert and oriented to person, place, and time  No cranial nerve deficit  Skin: Skin is warm and dry  No rash noted  He is not diaphoretic  No erythema  Psychiatric: He has a normal mood and affect  Nursing note and vitals reviewed  ED Medications  Medications   predniSONE tablet 60 mg (60 mg Oral Given 12/1/17 0345)   acetaminophen (TYLENOL) tablet 975 mg (975 mg Oral Given 12/1/17 0345)   oxyCODONE (ROXICODONE) immediate release tablet 10 mg (10 mg Oral Given 12/1/17 0344)     Diagnostic Studies  Results Reviewed     None        No orders to display     Procedures  Procedures    Phone Consults  ED Phone Contact    ED Course    A/P: This is a 47 y o  male who presents to the ED for evaluation of wrist and hand pain  Consistent with gout flair  Will administer course of corticosteroids  Additional pain control  I personally conducted a search of the [place] prescription drug monitoring program   No patient was found during the search  The patient will be provided with a prescription for controlled substance for outpatient management  I personally counseled the patient about appropriate use of these medications including but not limited to operating motor vehicles within 6 hours of taking a pill  Patient acknowledged receipt of these medication precautions  Return precautions discussed   Patient and family acknowledge receipt of same  We will discharge this patient home with primary care follow-up  Patient is in agreement with this plan as outlined above  [facility]  CritCare Time    Disposition  Final diagnoses:   Acute idiopathic gout of right hand   Acute idiopathic gout of right wrist     Time reflects when diagnosis was documented in both MDM as applicable and the Disposition within this note     Time User Action Codes Description Comment    12/1/2017  3:37 AM Elzbieta Chan Add [M10 041] Acute idiopathic gout of right hand     12/1/2017  3:37 AM Elzbieta Oliveiraty Add [M10 031] Acute idiopathic gout of right wrist       ED Disposition     ED Disposition Condition Comment    Discharge  Emilywyenni discharge to home/self care  Condition at discharge: Stable        Follow-up Information     Follow up With Specialties Details Why Deann Rockwell MD Family Medicine Schedule an appointment as soon as possible for a visit in 2 days For reevaluation [address]  [address] [address]  [phone]          Patient's Medications   Discharge Prescriptions    ACETAMINOPHEN (TYLENOL) 500 MG TABLET    Take 2 tablets by mouth every 6 (six) hours as needed for mild pain       Start Date: 12/1/2017 End Date: --       Order Dose: 1,000 mg       Quantity: 30 tablet    Refills: 0    MORPHINE (MSIR) 15 MG TABLET    Take 1 tablet by mouth every 6 (six) hours as needed for severe pain Max Daily Amount: 60 mg       Start Date: 12/1/2017 End Date: --       Order Dose: 15 mg       Quantity: 7 tablet    Refills: 0    PREDNISONE 20 MG TABLET    Take 2 tablets by mouth daily for 6 days       Start Date: 12/1/2017 End Date: 12/7/2017       Order Dose: 40 mg       Quantity: 12 tablet    Refills: 0     No discharge procedures on file  ED Provider  Attending physically available and evaluated Analisa  ROSANNE managed the patient along with the ED Attending      Electronically Signed by         Sherin Alberts MD  Resident  12/01/17 9268

## 2017-12-01 NOTE — ED ATTENDING ATTESTATION
Monique Limon MD, saw and evaluated the patient  I have discussed the patient with the resident/non-physician practitioner and agree with the resident's/non-physician practitioner's findings, Plan of Care, and MDM as documented in the resident's/non-physician practitioner's note, except where noted  All available labs and Radiology studies were reviewed  At this point I agree with the current assessment done in the Emergency Department  I have conducted an independent evaluation of this patient including a focused history of:    Emergency Department Note- Rodríguez Short 47 y o  male MRN: [id number]    Unit/Bed#: [facility] 2 Encounter: [id number]    Rodríguez Short is a 47 y o  male who presents with   Chief Complaint   Patient presents with    Gout Pain     Pt  reports he has been struggling with gout in his right arm and hand for over 2 weeks now  Unable to control pain with prescribed medications  History of Present Illness   HPI:  Rodríguez Short is a 47 y o  male who presents for evaluation of: For persistent hand pain from a flare of gout  Patient has had no fevers and chills  Review of Systems    Historical Information   Past Medical History:   Diagnosis Date    Arthritis     GERD (gastroesophageal reflux disease)     Gout     Hypercholesteremia     Hypertension      History reviewed  No pertinent surgical history    Social History   History   Alcohol Use    Yes     Comment: occassionally     History   Drug Use No     History   Smoking Status    Never Smoker   Smokeless Tobacco    Never Used     Family History: non-contributory    Meds/Allergies   all medications and allergies reviewed  No Known Allergies    Objective   First Vitals:   Blood Pressure: 155/88 (12/01/17 0302)  Pulse: 87 (12/01/17 0302)  Temperature: 98 4 °F (36 9 °C) (12/01/17 0302)  Temp Source: Oral (12/01/17 0302)  Respirations: 20 (12/01/17 0302)  Height: 5' 8" (172 7 cm) (12/01/17 0302)  Weight - Scale: 115 kg (254 lb) (17)  SpO2: 99 % (17)    Current Vitals:   Blood Pressure: 117/60 (17)  Pulse: 88 (17)  Temperature: 98 4 °F (36 9 °C) (17)  Temp Source: Oral (17)  Respirations: 18 (17)  Height: 5' 8" (172 7 cm) (17)  Weight - Scale: 115 kg (254 lb) (17)  SpO2: 98 % (17)    No intake or output data in the 24 hours ending 17    Invasive Devices          No matching active lines, drains, or airways          Physical Exam   Constitutional: He appears well-nourished  Musculoskeletal:   Left hand is tender; no effusion noted   Skin: Skin is warm and dry  Psychiatric: He has a normal mood and affect  His behavior is normal  Judgment and thought content normal          Medical Decision Makin  Acute gout flare of hand:     No results found for this or any previous visit (from the past 36 hour(s))  No orders to display         Portions of the record may have been created with voice recognition software  Occasional wrong word or "sound a like" substitutions may have occurred due to the inherent limitations of voice recognition software  Read the chart carefully and recognize, using context, where substitutions have occurred

## 2017-12-08 ENCOUNTER — ALLSCRIPTS OFFICE VISIT (OUTPATIENT)
Dept: OTHER | Facility: OTHER | Age: 52
End: 2017-12-08

## 2017-12-08 DIAGNOSIS — R79.89 OTHER SPECIFIED ABNORMAL FINDINGS OF BLOOD CHEMISTRY: ICD-10-CM

## 2017-12-08 DIAGNOSIS — I10 ESSENTIAL (PRIMARY) HYPERTENSION: ICD-10-CM

## 2017-12-08 DIAGNOSIS — E66.01 MORBID (SEVERE) OBESITY DUE TO EXCESS CALORIES (HCC): ICD-10-CM

## 2017-12-08 DIAGNOSIS — R73.01 IMPAIRED FASTING GLUCOSE: ICD-10-CM

## 2017-12-08 DIAGNOSIS — M10.9 GOUT: ICD-10-CM

## 2017-12-08 DIAGNOSIS — E78.5 HYPERLIPIDEMIA: ICD-10-CM

## 2017-12-10 NOTE — PROGRESS NOTES
Assessment    1  Benign essential hypertension (401 1) (I10)   2  Dyslipidemia (272 4) (E78 5)   3  Gout (274 9) (M10 9)   4  Morbid obesity (278 01) (E66 01)   5  Elevated LFTs (790 6) (R79 89)   6  Impaired fasting glucose (790 21) (R73 01)   7  Non compliance with medical treatment (V15 81) (Z91 19)    Plan  Benign essential hypertension    · AmLODIPine Besylate 2 5 MG Oral Tablet; take one tablet by mouth daily asdirected  Benign essential hypertension, Dyslipidemia, Elevated LFTs, Gout, Impaired fastingglucose, Morbid obesity    · (1) COMPREHENSIVE METABOLIC PANEL; Status:Active; Requested for:82Uxw3715;    · (1) HEMOGLOBIN A1C; Status:Active; Requested for:92Mqv3512;    · (1) LIPID PANEL FASTING W DIRECT LDL REFLEX; Status:Active; Requestedfor:13Dyr0508;    · (1) URIC ACID; Status:Active; Requested for:88Kju8507;   Dyslipidemia    · Lovastatin 40 MG Oral Tablet; TAKE ONE TABLET BY MOUTH AT BEDTIMEDAILY  Flu vaccine need    · Fluzone Quadrivalent 0 5 ML Intramuscular Suspension Prefilled Syringe  Gout    · Allopurinol 300 MG Oral Tablet; TAKE 1 TABLET TWICE DAILY   · PredniSONE 20 MG Oral Tablet; take 1 tablet bid with food    Discussion/Summary    Log discussion with pt that he needs to loss wt  also needs to change his diet and follow the recs to avoid more gout flare ups  with prednisone and tylenol  No more morphine needed  in 2-3 weeks with results  The patient was counseled regarding instructions for management,-- risk factor reductions,-- impressions  Possible side effects of new medications were reviewed with the patient/guardian today  The treatment plan was reviewed with the patient/guardian   The patient/guardian understands and agrees with the treatment plan      Chief Complaint  Patient complain of gout pain in his right wrist and knucklewent to the ER 2 weeks ago and was prescribed Morphine, Tylenol, and Prednisonepain level 4/10      History of Present Illness  HPI: Pt seen in ER for acute gout flare out in his right hand/elbow  he was Rx prednisone, morphine and Tylenol  has been taking tylenol and prednisone, not morphine anymore  is down 4/10  swelling also improved  doesnât follow the gout diet  is taking allopurinol  also continues to gain wtvery non complaint with recs  visit >6 months ago he was encouraged to loss wt and start lifestyle modifications also to follow up in 1 month but he didnâthas been referred to  wt management but he is still to make apt with them  Active Problems    1  Benign essential hypertension (401 1) (I10)   2  Chronic lumbar pain (724 2,338 29) (M54 5,G89 29)   3  Degenerative lumbar spinal stenosis (724 02) (M48 061)   4  Depression screening (V79 0) (Z13 89)   5  Dyslipidemia (272 4) (E78 5)   6  Elevated LFTs (790 6) (R79 89)   7  Encounter for screening colonoscopy (V76 51) (Z12 11)   8  Esophageal reflux (530 81) (K21 9)   9  Gout (274 9) (M10 9)   10  Herniated lumbar intervertebral disc (722 10) (M51 26)   11  Hiatal hernia (553 3) (K44 9)   12  Impaired fasting glucose (790 21) (R73 01)   13  Knee pain, bilateral (719 46) (M25 561,M25 562)   14  Lead-induced gout of left elbow, unspecified chronicity, initial encounter (984 9,274 00)  (T56 0X1A,M10 122)   15  Left elbow pain (719 42) (M25 522)   16  Long term use of drug (V58 69) (Z79 899)   17  Low back pain (724 2) (M54 5)   18  Lumbar neuritis (724 4) (M54 16)   19  Multiple acquired skin tags (701 9) (L91 8)   20  Tinea cruris (110 3) (B35 6)   21  Weight gain (783 1) (R63 5)    Past Medical History  1  History of abnormal weight loss (V13 89) (Z87 898)   2  Impaired fasting glucose (790 21) (R73 01)   3  History of Sleep related leg cramps (327 52) (G47 62)  Active Problems And Past Medical History Reviewed: The active problems and past medical history were reviewed and updated today  Family History  Mother    1  Family history of   Father    2   Family history of Known health problems: none  Family History    3  Family history of No Significant Family History    Social History   · Being A Social Drinker   · Caffeine Use   · Never A Smoker   · Unemployed (V62 0)    Surgical History    1  History of Prior Surgical Procedure Not Done    Current Meds   1  Acetaminophen 500 MG CAPS; Therapy: (Recorded:46Syi6621) to Recorded   2  Allopurinol 300 MG Oral Tablet; take 1 tablet by mouth once daily; Therapy: 21MFY4313 to (EMFDAEQP:87PGW8629)  Requested for: 67IOA0160; Last Rx:30Jun2017 Ordered   3  AmLODIPine Besylate 2 5 MG Oral Tablet; take one tablet by mouth daily as directed; Therapy: 16MEZ1399 to (Evaluate:12Nov2017)  Requested for: 22FLK5396; Last Rx:13Oct2017 Ordered   4  Fenofibrate 160 MG Oral Tablet; take one tablet by mouth daily; Therapy: 18Gsv2458 to (Last Rx:13Oct2017)  Requested for: 13Oct2017 Ordered   5  Lisinopril-Hydrochlorothiazide 20-25 MG Oral Tablet; take one tablet by mouth daily; Therapy: 09HZV8951 to (Last Rx:16Oct2017)  Requested for: 16Oct2017 Ordered   6  Lovastatin 40 MG Oral Tablet; TAKE ONE TABLET BY MOUTH AT BEDTIME DAILY; Therapy: 29QSS5891 to (Evaluate:11Jun2017)  Requested for: 90XPQ9008; Last Rx:05Ygv1037 Ordered   7  Morphine Sulfate 15 MG Oral Tablet; Therapy: (Recorded:72Pza4496) to Recorded   8  Omeprazole 20 MG Oral Capsule Delayed Release; TAKE TWO CAPSULES BY MOUTH DAILY; Therapy: 89Ibl0920 to (Last Rx:29Nov2017)  Requested for: 49HFT4664 Ordered   9  PredniSONE 20 MG Oral Tablet; Therapy: (Recorded:96Tvc4777) to Recorded    The medication list was reviewed and updated today  Allergies  1   No Known Drug Allergies    Vitals   Recorded: 33EZC6412 08:30AM   Temperature 98 1 F   Heart Rate 95   Respiration 18   Systolic 079   Diastolic 90   Height 5 ft 6 14 in   Weight 272 lb    BMI Calculated 43 71   BSA Calculated 2 28   O2 Saturation 98   Pain Scale 4       Physical Exam   Constitutional  General appearance: No acute distress, well appearing and well nourished  -- central obesity  Pulmonary  Respiratory effort: No increased work of breathing or signs of respiratory distress  Auscultation of lungs: Clear to auscultation, equal breath sounds bilaterally, no wheezes, no rales, no rhonci  Cardiovascular  Auscultation of heart: Normal rate and rhythm, normal S1 and S2, without murmurs  Examination of extremities for edema and/or varicosities: Normal    Musculoskeletal  Gait and station: Normal    Inspection/palpation of joints, bones, and muscles: Abnormal  -- right hand swellign at the 2nd and 3rd MCP, + tenderness at the right elbow  Psychiatric  Mood and affect: Normal          Future Appointments    Date/Time Provider Specialty Site   12/21/2017 09:00 AM ABHISHEK Ocasio   Family Medicine [address]       Signatures   Electronically signed by : ABHISHEK Dejesus ; Dec  9 2017 10:24AM EST                       (Author)

## 2017-12-14 ENCOUNTER — TRANSCRIBE ORDERS (OUTPATIENT)
Dept: LAB | Facility: HOSPITAL | Age: 52
End: 2017-12-14

## 2017-12-14 ENCOUNTER — APPOINTMENT (OUTPATIENT)
Dept: LAB | Facility: HOSPITAL | Age: 52
End: 2017-12-14
Payer: COMMERCIAL

## 2017-12-14 DIAGNOSIS — E66.01 MORBID (SEVERE) OBESITY DUE TO EXCESS CALORIES (HCC): ICD-10-CM

## 2017-12-14 DIAGNOSIS — M10.9 GOUT: ICD-10-CM

## 2017-12-14 DIAGNOSIS — R73.01 IMPAIRED FASTING GLUCOSE: ICD-10-CM

## 2017-12-14 DIAGNOSIS — I10 ESSENTIAL (PRIMARY) HYPERTENSION: ICD-10-CM

## 2017-12-14 DIAGNOSIS — R79.89 OTHER SPECIFIED ABNORMAL FINDINGS OF BLOOD CHEMISTRY: ICD-10-CM

## 2017-12-14 DIAGNOSIS — E78.5 HYPERLIPIDEMIA: ICD-10-CM

## 2017-12-14 LAB
ALBUMIN SERPL BCP-MCNC: 3.8 G/DL (ref 3.5–5)
ALP SERPL-CCNC: 137 U/L (ref 46–116)
ALT SERPL W P-5'-P-CCNC: 82 U/L (ref 12–78)
ANION GAP SERPL CALCULATED.3IONS-SCNC: 8 MMOL/L (ref 4–13)
AST SERPL W P-5'-P-CCNC: 39 U/L (ref 5–45)
BILIRUB SERPL-MCNC: 0.48 MG/DL (ref 0.2–1)
BUN SERPL-MCNC: 26 MG/DL (ref 5–25)
CALCIUM SERPL-MCNC: 9.6 MG/DL (ref 8.3–10.1)
CHLORIDE SERPL-SCNC: 102 MMOL/L (ref 100–108)
CHOLEST SERPL-MCNC: 200 MG/DL (ref 50–200)
CO2 SERPL-SCNC: 27 MMOL/L (ref 21–32)
CREAT SERPL-MCNC: 1.08 MG/DL (ref 0.6–1.3)
EST. AVERAGE GLUCOSE BLD GHB EST-MCNC: 128 MG/DL
GFR SERPL CREATININE-BSD FRML MDRD: 79 ML/MIN/1.73SQ M
GLUCOSE P FAST SERPL-MCNC: 85 MG/DL (ref 65–99)
HBA1C MFR BLD: 6.1 % (ref 4.2–6.3)
HDLC SERPL-MCNC: 45 MG/DL (ref 40–60)
LDLC SERPL CALC-MCNC: 125 MG/DL (ref 0–100)
POTASSIUM SERPL-SCNC: 3.5 MMOL/L (ref 3.5–5.3)
PROT SERPL-MCNC: 8.2 G/DL (ref 6.4–8.2)
SODIUM SERPL-SCNC: 137 MMOL/L (ref 136–145)
TRIGL SERPL-MCNC: 151 MG/DL
URATE SERPL-MCNC: 5.9 MG/DL (ref 4.2–8)

## 2017-12-14 PROCEDURE — 84550 ASSAY OF BLOOD/URIC ACID: CPT

## 2017-12-14 PROCEDURE — 80061 LIPID PANEL: CPT

## 2017-12-14 PROCEDURE — 36415 COLL VENOUS BLD VENIPUNCTURE: CPT

## 2017-12-14 PROCEDURE — 80053 COMPREHEN METABOLIC PANEL: CPT

## 2017-12-14 PROCEDURE — 83036 HEMOGLOBIN GLYCOSYLATED A1C: CPT

## 2017-12-21 ENCOUNTER — GENERIC CONVERSION - ENCOUNTER (OUTPATIENT)
Dept: OTHER | Facility: OTHER | Age: 52
End: 2017-12-21

## 2018-01-09 NOTE — RESULT NOTES
Verified Results  (1) LIPID PANEL FASTING W DIRECT LDL REFLEX [id number] 07:54AM [employer] Order Number: [id number]      Triglyceride:         Normal              <150 mg/dl       Borderline High    150-199 mg/dl       High               200-499 mg/dl       Very High          >499 mg/dl  Cholesterol:         Desirable        <200 mg/dl      Borderline High  200-239 mg/dl      High             >239 mg/dl  HDL Cholesterol:        High    >59 mg/dL      Low     <41 mg/dL  LDL Cholesterol:        Optimal          <100 mg/dl         Near Optimal     100-129 mg/dl        Above Optimal          Borderline High   130-159 mg/dl          High              160-189 mg/dl          Very High        >189 mg/dl  LDL CALCULATED:    This screening LDL is a calculated result  It does not have the accuracy of the Direct Measured LDL in the monitoring of patients with hyperlipidemia and/or statin therapy  Direct Measure LDL (BFI060) must be ordered separately in these patients  Test Name Result Flag Reference   CHOLESTEROL 208 mg/dL H    LDL CHOLESTEROL CALCULATED 108 mg/dL H 0-100   TRIGLYCERIDES 298 mg/dL H <=150   HDL,DIRECT 40 mg/dL  40-60     (1) HEMOGLOBIN A1C [id number] 07:54AM [employer] Order Number: [id number]      5 7-6 4% impaired fasting glucose  >=6 5% diagnosis of diabetes    Falsely low levels are seen in conditions linked to short RBC life span-  hemolytic anemia, and splenomegaly  Falsely elevated levels are seen in situations where there is an increased production of RBC- receipt of erythropoietin or blood transfusions  Adopted from ADA-Clinical Practice Recommendations     Test Name Result Flag Reference   HEMOGLOBIN A1C 5 8 % H 4 0-5 6   EST  AVG   GLUCOSE 120 mg/dl       (1) COMPREHENSIVE METABOLIC PANEL [id number] 68:04HM [employer] Order Number: [id number]      National Kidney Disease Education Program recommendations are as follows:  GFR calculation is accurate only with a steady state creatinine  Chronic Kidney disease less than 60 ml/min/1 73 sq  meters  Kidney failure less than 15 ml/min/1 73 sq  meters  Test Name Result Flag Reference   GLUCOSE,RANDM 92 mg/dL     If the patient is fasting, the ADA then defines impaired fasting glucose as > 100 mg/dL and diabetes as > or equal to 123 mg/dL  SODIUM 140 mmol/L  136-145   POTASSIUM 4 1 mmol/L  3 5-5 3   CHLORIDE 103 mmol/L  100-108   CARBON DIOXIDE 29 mmol/L  21-32   ANION GAP (CALC) 8 mmol/L  4-13   BLOOD UREA NITROGEN 19 mg/dL  5-25   CREATININE 1 01 mg/dL  0 60-1 30   Standardized to IDMS reference method   CALCIUM 9 0 mg/dL  8 3-10 1   BILI, TOTAL 0 67 mg/dL  0 20-1 00   ALK PHOSPHATAS 105 U/L     ALT (SGPT) 123 U/L H 12-78   AST(SGOT) 66 U/L H 5-45   ALBUMIN 3 9 g/dL  3 5-5 0   TOTAL PROTEIN 7 7 g/dL  6 4-8 2   eGFR Non-African American      >60 0 ml/min/1 73sq m       Discussion/Summary   Pt coming in 1 month change mine bc he is in [place]  Needs to get liver US   for elevated LFTs  will discuss at appt     Signatures   Electronically signed by : ABHISHEK Schuster ; Mar 17 2016  5:22PM EST                       (Author)

## 2018-01-13 VITALS
BODY MASS INDEX: 41.59 KG/M2 | DIASTOLIC BLOOD PRESSURE: 82 MMHG | TEMPERATURE: 98.3 F | OXYGEN SATURATION: 98 % | WEIGHT: 265 LBS | HEIGHT: 67 IN | SYSTOLIC BLOOD PRESSURE: 124 MMHG | HEART RATE: 81 BPM

## 2018-01-15 NOTE — RESULT NOTES
Message   please notify t has a small spur and changes of arthritis seen on xray of the left elbow  No fractures, no dislocation  Has appt with ortho 5/20  tx     Verified Results  * XR ELBOW 3+ VIEW LEFT [id number] 06:54AM Torie Mireles Order Number: [id number]     Test Name Result Flag Reference   XR ELBOW 3+ VW LEFT (Report)     LEFT ELBOW     INDICATION: Left posterior elbow pain  COMPARISON: None     VIEWS: AP, lateral and obliques ; 4 images     FINDINGS:     There is no acute fracture or dislocation  There is no joint effusion  No degenerative changes  Tiny olecranon spur  Tiny coronoid spur  Soft tissues are unremarkable  IMPRESSION:     Mild degenerative changes, left elbow  Workstation performed: [id number]     Signed by:   Jose Gandhi MD   5/11/16       Signatures   Electronically signed by : ABHISHEK Francis ; May 11 2016  7:12PM EST                       (Author)

## 2018-01-15 NOTE — MISCELLANEOUS
Signatures   Electronically signed by : ABHISHEK Pelletier ; Aug  2 2016  1:13PM EST                       (Author)

## 2018-01-23 VITALS
BODY MASS INDEX: 43.71 KG/M2 | RESPIRATION RATE: 18 BRPM | TEMPERATURE: 98.1 F | SYSTOLIC BLOOD PRESSURE: 130 MMHG | WEIGHT: 272 LBS | HEART RATE: 95 BPM | OXYGEN SATURATION: 98 % | HEIGHT: 66 IN | DIASTOLIC BLOOD PRESSURE: 90 MMHG

## 2018-01-24 VITALS
OXYGEN SATURATION: 98 % | RESPIRATION RATE: 18 BRPM | HEIGHT: 66 IN | BODY MASS INDEX: 43.23 KG/M2 | WEIGHT: 269 LBS | DIASTOLIC BLOOD PRESSURE: 70 MMHG | HEART RATE: 91 BPM | SYSTOLIC BLOOD PRESSURE: 120 MMHG | TEMPERATURE: 97.9 F

## 2018-02-06 DIAGNOSIS — I15.9 SECONDARY HYPERTENSION: ICD-10-CM

## 2018-02-06 DIAGNOSIS — M10.9 GOUT, UNSPECIFIED CAUSE, UNSPECIFIED CHRONICITY, UNSPECIFIED SITE: ICD-10-CM

## 2018-02-06 DIAGNOSIS — E78.5 HYPERLIPIDEMIA, UNSPECIFIED HYPERLIPIDEMIA TYPE: Primary | ICD-10-CM

## 2018-02-06 RX ORDER — LISINOPRIL AND HYDROCHLOROTHIAZIDE 25; 20 MG/1; MG/1
TABLET ORAL
Qty: 30 TABLET | Refills: 1 | Status: SHIPPED | OUTPATIENT
Start: 2018-02-06 | End: 2018-03-22

## 2018-02-06 RX ORDER — ALLOPURINOL 300 MG/1
TABLET ORAL
Qty: 60 TABLET | Refills: 1 | Status: SHIPPED | OUTPATIENT
Start: 2018-02-06 | End: 2018-04-03 | Stop reason: SDUPTHER

## 2018-02-06 RX ORDER — FENOFIBRATE 160 MG/1
TABLET ORAL
Qty: 30 TABLET | Refills: 1 | Status: SHIPPED | OUTPATIENT
Start: 2018-02-06 | End: 2018-04-03 | Stop reason: SDUPTHER

## 2018-03-09 RX ORDER — PREDNISONE 20 MG/1
TABLET ORAL
Qty: 10 TABLET | OUTPATIENT
Start: 2018-03-09

## 2018-03-21 NOTE — PROGRESS NOTES
Assessment/Plan:    No problem-specific Assessment & Plan notes found for this encounter  Diagnoses and all orders for this visit:    Screening for colon cancer  -     Ambulatory referral to Gastroenterology; Future    Gout, unspecified cause, unspecified chronicity, unspecified site    Gastroesophageal reflux disease, esophagitis presence not specified    Impaired fasting glucose    Benign essential hypertension    Dyslipidemia    Morbid obesity ([address] )    Other orders  -     lovastatin (MEVACOR) 40 MG tablet; Take 1 tablet by mouth        Fu 3 months  Subjective:   Pt here for follow up visit  Labs done in December  Last colonoscopy 10/2013 was due in 3 years (2016)  Pt says he is no longer taking celcoxib it upsets his stomach, stop taking shortly after 1st being prescribed in December  Complaining of leg pain for a couple of week w/swelling       Patient ID: Arely Kline is a 47 y o  male  HPI   Patient presents for follow-up  Patient very noncompliant with recommendations  Eighty numb make appointment with [facility] weight management  He has gained 16 lb since last visit  He can't understand why he has gained weight as he has tried to cut down and modified somewhat his diet  He did spend last 2-3 months back in [place]   He arrive 2 weeks ago  He complains of a lot of joint pains, lot of pain on his right ankle, he states had a lot of swelling now is down  He has history of gout  He has been taking his allopurinol  He tried multiple acute agents without much improvement  He admits prednisone work the best     He has been having cough with phlegm for 1 week  He came from [place] 2 weeks ago  He tried rompepecho  No fevers, no chills           The following portions of the patient's history were reviewed and updated as appropriate: allergies, current medications, past family history, past medical history, past social history, past surgical history and problem list     Review of Systems   Constitutional: Negative for activity change and appetite change  Respiratory: Negative  Cardiovascular: Negative  Gastrointestinal: Negative  Genitourinary: Negative  Musculoskeletal: Negative for arthralgias  Skin: Negative for rash  Psychiatric/Behavioral: Negative  Objective:      /90   Pulse 90   Temp 98 3 °F (36 8 °C)   Ht 5' 5" (1 651 m)   Wt 123 kg (272 lb)   SpO2 98%   BMI 45 26 kg/m²          Physical Exam   Constitutional: He is oriented to person, place, and time  He appears well-developed and well-nourished  HENT:   Head: Normocephalic  Eyes: Conjunctivae are normal    Cardiovascular: Normal rate, regular rhythm and normal heart sounds  Pulmonary/Chest: Effort normal and breath sounds normal  No respiratory distress  He has no wheezes  He has no rales  Neurological: He is alert and oriented to person, place, and time  Psychiatric: He has a normal mood and affect   His behavior is normal

## 2018-03-22 ENCOUNTER — OFFICE VISIT (OUTPATIENT)
Dept: FAMILY MEDICINE CLINIC | Facility: CLINIC | Age: 53
End: 2018-03-22
Payer: COMMERCIAL

## 2018-03-22 VITALS
SYSTOLIC BLOOD PRESSURE: 122 MMHG | WEIGHT: 272 LBS | DIASTOLIC BLOOD PRESSURE: 90 MMHG | OXYGEN SATURATION: 98 % | HEART RATE: 90 BPM | TEMPERATURE: 98.3 F | BODY MASS INDEX: 45.32 KG/M2 | HEIGHT: 65 IN

## 2018-03-22 DIAGNOSIS — E78.5 DYSLIPIDEMIA: ICD-10-CM

## 2018-03-22 DIAGNOSIS — K21.9 GASTROESOPHAGEAL REFLUX DISEASE, ESOPHAGITIS PRESENCE NOT SPECIFIED: ICD-10-CM

## 2018-03-22 DIAGNOSIS — G89.29 CHRONIC MIDLINE LOW BACK PAIN, WITH SCIATICA PRESENCE UNSPECIFIED: Primary | ICD-10-CM

## 2018-03-22 DIAGNOSIS — Z12.11 SCREENING FOR COLON CANCER: Primary | ICD-10-CM

## 2018-03-22 DIAGNOSIS — M54.5 CHRONIC MIDLINE LOW BACK PAIN, WITH SCIATICA PRESENCE UNSPECIFIED: Primary | ICD-10-CM

## 2018-03-22 DIAGNOSIS — I10 BENIGN ESSENTIAL HYPERTENSION: ICD-10-CM

## 2018-03-22 DIAGNOSIS — E66.01 MORBID OBESITY (HCC): ICD-10-CM

## 2018-03-22 DIAGNOSIS — M10.9 GOUT, UNSPECIFIED CAUSE, UNSPECIFIED CHRONICITY, UNSPECIFIED SITE: ICD-10-CM

## 2018-03-22 DIAGNOSIS — J06.9 VIRAL UPPER RESPIRATORY TRACT INFECTION: ICD-10-CM

## 2018-03-22 DIAGNOSIS — R73.01 IMPAIRED FASTING GLUCOSE: ICD-10-CM

## 2018-03-22 PROBLEM — M54.50 LOW BACK PAIN: Status: ACTIVE | Noted: 2017-06-30

## 2018-03-22 PROCEDURE — 99215 OFFICE O/P EST HI 40 MIN: CPT | Performed by: FAMILY MEDICINE

## 2018-03-22 RX ORDER — LISINOPRIL 30 MG/1
30 TABLET ORAL DAILY
Qty: 90 TABLET | Refills: 1 | Status: SHIPPED | OUTPATIENT
Start: 2018-03-22 | End: 2018-09-18 | Stop reason: SDUPTHER

## 2018-03-22 RX ORDER — BROMPHENIRAMINE MALEATE, PSEUDOEPHEDRINE HYDROCHLORIDE, AND DEXTROMETHORPHAN HYDROBROMIDE 2; 30; 10 MG/5ML; MG/5ML; MG/5ML
5 SYRUP ORAL 3 TIMES DAILY PRN
Qty: 120 ML | Refills: 0 | Status: SHIPPED | OUTPATIENT
Start: 2018-03-22 | End: 2018-04-05 | Stop reason: ALTCHOICE

## 2018-03-22 RX ORDER — BENZONATATE 200 MG/1
200 CAPSULE ORAL 3 TIMES DAILY PRN
Qty: 20 CAPSULE | Refills: 0 | Status: SHIPPED | OUTPATIENT
Start: 2018-03-22 | End: 2018-04-05 | Stop reason: ALTCHOICE

## 2018-03-22 RX ORDER — LOVASTATIN 40 MG/1
1 TABLET ORAL
COMMUNITY
Start: 2015-02-11 | End: 2018-05-30 | Stop reason: SDUPTHER

## 2018-03-22 RX ORDER — METHYLPREDNISOLONE 4 MG/1
TABLET ORAL
Qty: 21 TABLET | Refills: 0 | Status: SHIPPED | OUTPATIENT
Start: 2018-03-22 | End: 2018-04-05 | Stop reason: ALTCHOICE

## 2018-03-28 ENCOUNTER — APPOINTMENT (OUTPATIENT)
Dept: LAB | Facility: HOSPITAL | Age: 53
End: 2018-03-28
Payer: COMMERCIAL

## 2018-03-28 DIAGNOSIS — K21.9 GASTROESOPHAGEAL REFLUX DISEASE, ESOPHAGITIS PRESENCE NOT SPECIFIED: ICD-10-CM

## 2018-03-28 DIAGNOSIS — I10 BENIGN ESSENTIAL HYPERTENSION: ICD-10-CM

## 2018-03-28 DIAGNOSIS — M10.9 GOUT, UNSPECIFIED CAUSE, UNSPECIFIED CHRONICITY, UNSPECIFIED SITE: ICD-10-CM

## 2018-03-28 DIAGNOSIS — R73.01 IMPAIRED FASTING GLUCOSE: ICD-10-CM

## 2018-03-28 DIAGNOSIS — J06.9 VIRAL UPPER RESPIRATORY TRACT INFECTION: ICD-10-CM

## 2018-03-28 DIAGNOSIS — E66.01 MORBID OBESITY (HCC): ICD-10-CM

## 2018-03-28 DIAGNOSIS — Z12.11 SCREENING FOR COLON CANCER: ICD-10-CM

## 2018-03-28 DIAGNOSIS — E78.5 DYSLIPIDEMIA: ICD-10-CM

## 2018-03-28 LAB
ALBUMIN SERPL BCP-MCNC: 3.5 G/DL (ref 3.5–5)
ALP SERPL-CCNC: 104 U/L (ref 46–116)
ALT SERPL W P-5'-P-CCNC: 43 U/L (ref 12–78)
ANION GAP SERPL CALCULATED.3IONS-SCNC: 7 MMOL/L (ref 4–13)
AST SERPL W P-5'-P-CCNC: 25 U/L (ref 5–45)
BILIRUB SERPL-MCNC: 0.42 MG/DL (ref 0.2–1)
BUN SERPL-MCNC: 14 MG/DL (ref 5–25)
CALCIUM SERPL-MCNC: 8.9 MG/DL (ref 8.3–10.1)
CHLORIDE SERPL-SCNC: 105 MMOL/L (ref 100–108)
CHOLEST SERPL-MCNC: 230 MG/DL (ref 50–200)
CO2 SERPL-SCNC: 27 MMOL/L (ref 21–32)
CREAT SERPL-MCNC: 0.75 MG/DL (ref 0.6–1.3)
EST. AVERAGE GLUCOSE BLD GHB EST-MCNC: 128 MG/DL
GFR SERPL CREATININE-BSD FRML MDRD: 106 ML/MIN/1.73SQ M
GLUCOSE P FAST SERPL-MCNC: 94 MG/DL (ref 65–99)
HBA1C MFR BLD: 6.1 % (ref 4.2–6.3)
HDLC SERPL-MCNC: 40 MG/DL (ref 40–60)
LDLC SERPL CALC-MCNC: 157 MG/DL (ref 0–100)
POTASSIUM SERPL-SCNC: 3.5 MMOL/L (ref 3.5–5.3)
PROT SERPL-MCNC: 8 G/DL (ref 6.4–8.2)
SODIUM SERPL-SCNC: 139 MMOL/L (ref 136–145)
TRIGL SERPL-MCNC: 166 MG/DL
TSH SERPL DL<=0.05 MIU/L-ACNC: 3.41 UIU/ML (ref 0.36–3.74)
URATE SERPL-MCNC: 8.4 MG/DL (ref 4.2–8)

## 2018-03-28 PROCEDURE — 83036 HEMOGLOBIN GLYCOSYLATED A1C: CPT

## 2018-03-28 PROCEDURE — 84550 ASSAY OF BLOOD/URIC ACID: CPT

## 2018-03-28 PROCEDURE — 80053 COMPREHEN METABOLIC PANEL: CPT

## 2018-03-28 PROCEDURE — 36415 COLL VENOUS BLD VENIPUNCTURE: CPT

## 2018-03-28 PROCEDURE — 80061 LIPID PANEL: CPT

## 2018-03-28 PROCEDURE — 84443 ASSAY THYROID STIM HORMONE: CPT

## 2018-04-03 DIAGNOSIS — M10.9 GOUT, UNSPECIFIED CAUSE, UNSPECIFIED CHRONICITY, UNSPECIFIED SITE: ICD-10-CM

## 2018-04-03 DIAGNOSIS — E78.5 HYPERLIPIDEMIA, UNSPECIFIED HYPERLIPIDEMIA TYPE: ICD-10-CM

## 2018-04-03 RX ORDER — CELECOXIB 100 MG/1
CAPSULE ORAL
Qty: 30 CAPSULE | Refills: 1 | Status: SHIPPED | OUTPATIENT
Start: 2018-04-03 | End: 2018-04-05

## 2018-04-03 RX ORDER — ALLOPURINOL 300 MG/1
TABLET ORAL
Qty: 60 TABLET | Refills: 1 | Status: SHIPPED | OUTPATIENT
Start: 2018-04-03 | End: 2018-05-30 | Stop reason: SDUPTHER

## 2018-04-03 RX ORDER — FENOFIBRATE 160 MG/1
TABLET ORAL
Qty: 30 TABLET | Refills: 1 | Status: SHIPPED | OUTPATIENT
Start: 2018-04-03 | End: 2018-05-30 | Stop reason: SDUPTHER

## 2018-04-05 ENCOUNTER — OFFICE VISIT (OUTPATIENT)
Dept: NEUROSURGERY | Facility: CLINIC | Age: 53
End: 2018-04-05
Payer: COMMERCIAL

## 2018-04-05 VITALS
RESPIRATION RATE: 16 BRPM | HEIGHT: 65 IN | HEART RATE: 75 BPM | SYSTOLIC BLOOD PRESSURE: 145 MMHG | TEMPERATURE: 97.4 F | DIASTOLIC BLOOD PRESSURE: 94 MMHG | BODY MASS INDEX: 45.32 KG/M2 | WEIGHT: 272 LBS

## 2018-04-05 DIAGNOSIS — M54.5 CHRONIC MIDLINE LOW BACK PAIN, WITH SCIATICA PRESENCE UNSPECIFIED: ICD-10-CM

## 2018-04-05 DIAGNOSIS — M54.16 LUMBAR RADICULOPATHY: ICD-10-CM

## 2018-04-05 DIAGNOSIS — G89.29 CHRONIC BILATERAL LOW BACK PAIN WITH RIGHT-SIDED SCIATICA: Primary | ICD-10-CM

## 2018-04-05 DIAGNOSIS — M54.41 CHRONIC BILATERAL LOW BACK PAIN WITH RIGHT-SIDED SCIATICA: Primary | ICD-10-CM

## 2018-04-05 DIAGNOSIS — G89.29 CHRONIC MIDLINE LOW BACK PAIN, WITH SCIATICA PRESENCE UNSPECIFIED: ICD-10-CM

## 2018-04-05 DIAGNOSIS — M48.061 LUMBAR FORAMINAL STENOSIS: ICD-10-CM

## 2018-04-05 PROCEDURE — 99204 OFFICE O/P NEW MOD 45 MIN: CPT | Performed by: PHYSICIAN ASSISTANT

## 2018-04-05 NOTE — LETTER
April 5, 2018     Yao Berwer MD  [address]  [address]  [address] [id number]    Patient: Kunal Gamez   YOB: 1965   Date of Visit: 4/5/2018       Dear Dr Sukumar Rojas: Thank you for referring Nelia Gutierrez to me for evaluation  Below are my notes for this consultation  If you have questions, please do not hesitate to call me  I look forward to following your patient along with you  Sincerely,        Lakesha Marie PA-C        CC: No Recipients  Lakesha Marie PA-C  4/5/2018 12:14 PM  Sign at close encounter  Assessment/Plan:    Very pleasant Nicaraguan-speaking 28-year-old male, presents with complaint of chronic low back pain of 7-8 years duration  There is no recent imaging review however he does have history of MRI of the lumbar spine 10/8/13, which does reveal mild bilateral foraminal stenosis L3-L4, L4-L5, and L5-S1 with the central disc herniation at L3-L4 and L4-L5  He reports this time the low back pain particularly affecting his right leg right buttocks is worsening, and is present regardless of position  He has had remote physical therapy and epidural steroid injection neither of which helped  Conservative management with a course of physical therapy is advised and a consultation has been placed  In addition considering the previously identified changes on MRI, plain films of the lumbar spine requested with flexion-extension views as well as a repeat MRI of the lumbar spine which will be performed under general anesthesia secondary to his fairly substantial claustrophobia  In addition the patient is instructed to meet with primary care provider to discuss weight loss  Further follow-up is planned post imaging and/or therapy      These findings, impressions and recommendations are reviewed in great detail with the patient via [employer] [place] , Ashvin [place], he expressed understanding and agreement, his questions were answered completely and to his satisfaction  Follow up has been scheduled, imaging has been requested, and a course of physical therapy is also been requested  Diagnoses and all orders for this visit:    Chronic bilateral low back pain with right-sided sciatica  -     MRI lumbar spine without contrast; Future  -     Ambulatory referral to Physical Therapy; Future  -     XR spine lumbar complete w bending minimum 6 views; Future    Chronic midline low back pain, with sciatica presence unspecified  -     Ambulatory referral to Spine Surgery  -     MRI lumbar spine without contrast; Future  -     Ambulatory referral to Physical Therapy; Future  -     XR spine lumbar complete w bending minimum 6 views; Future    Lumbar radiculopathy  -     MRI lumbar spine without contrast; Future  -     Ambulatory referral to Physical Therapy; Future  -     XR spine lumbar complete w bending minimum 6 views; Future    Lumbar foraminal stenosis  -     MRI lumbar spine without contrast; Future  -     Ambulatory referral to Physical Therapy; Future  -     XR spine lumbar complete w bending minimum 6 views; Future          Subjective:      Patient ID: Auther Schirmer is a 47 y o  male  Pleasant Polish-speaking 21-year-old male, interviewed and examined with the assistance of medical assistant Tilmon Lowndes  He has complaint of chronic low back pain since approximately 2009, he reports this has been persistently worsening over the years  At this time he has pain regardless of position although it is somewhat improved when he is in recumbent position  The    There is no recent imaging however there had been an MRI of the lumbar spine in 2013  He has a history of a brief course of physical therapy approximately 1 and half years ago as well as consultation with Dr Nuno Flanagan his IV, spine and pain with epidural steroid injection x1 also about 1 and half years ago either which provided him with any relief      At this time he takes no regular medications for his back pain although he does have a history of gout for which he is on allopurinol and has been seen in the emergency room where he was provided with morphine, Percocet, as well as receiving tramadol and Naprosyn from his primary care provider  He reports he has exacerbations of his gout at least weekly, particularly affecting his right shoulder, right elbow and right hand, with his right elbow being the most problematic  He reports he takes his gout medications which are of some help for his low back pain  He has been disabled since prior to 2013 secondary to his back pain  He also has seen his primary care provider for obesity, who advised him to see a weight loss practice, he has not completed this at this time  He denies diabetes mellitus, coronary artery disease, pulmonary conditions and he reports he is a lifetime nonsmoker  He does have a history of hypertension which is reported as controlled  He denies bowel or bladder incontinence, perineal anesthesia, he does report weakness in his right greater than his left leg, and he does report chronic pain in his right foot which does affect his ambulation  In addition he reports decreased ambulatory distance secondary to leg pain right greater than left            The following portions of the patient's history were reviewed and updated as appropriate: allergies, current medications, past family history, past medical history, past social history and past surgical history  Review of Systems   Constitutional: Positive for activity change  HENT: Negative  Eyes: Negative  Respiratory: Negative  Cardiovascular: Negative  Gastrointestinal: Negative  Endocrine: Negative  Genitourinary: Negative  Musculoskeletal: Positive for back pain (bilateral low back pain radiating to bilateral legs; R>L ) and gait problem (problems walking)  Swelling in hands and feet at times   Skin: Negative  Allergic/Immunologic: Negative  Neurological: Positive for weakness (bilateral legs) and numbness (and tingling in bilateral legs)  Hematological: Negative  Psychiatric/Behavioral: Negative  All other systems reviewed and are negative  Objective:    Physical Exam   Constitutional: He appears well-developed  Morbidly obese with a BMI of 45 6   HENT:   Head: Normocephalic and atraumatic  Neck: Normal range of motion  Cardiovascular: Normal rate, regular rhythm and normal heart sounds  Pulmonary/Chest: Effort normal and breath sounds normal    Musculoskeletal:   Back examination;    Straight legs 90° bilaterally with some discomfort on the left at 90°  There is diffuse tenderness/muscle spasm noted with palpation about the entire low back  Neurological:   Reflex Scores:       Patellar reflexes are 2+ on the right side and 2+ on the left side  Achilles reflexes are 1+ on the right side and 1+ on the left side        Neurologic Exam     Mental Status   Level of consciousness: alert    Motor Exam   Muscle bulk: normal  Overall muscle tone: normal    Strength   Right quadriceps: 5/5  Left quadriceps: 5/5  Right hamstrin/5  Left hamstrin/5  Right glutei: 5/5  Left glutei: 5/5  Right posterior tibial: 5/5  Left posterior tibial: 5/5    Sensory Exam   Light touch normal      Gait, Coordination, and Reflexes     Gait  Gait: shuffling    Reflexes   Right patellar: 2+  Left patellar: 2+  Right achilles: 1+  Left achilles: 1+  Right ankle clonus: absent  Left ankle clonus: absent

## 2018-04-05 NOTE — PATIENT INSTRUCTIONS
Meet with her family doctor to discuss weight loss  MRI lumbar spine  Physical therapy, request has been placed  Further follow-up with Neurosurgery post imaging    Dr Heidi Thorne, [facility], [facility])

## 2018-04-05 NOTE — PROGRESS NOTES
Assessment/Plan:    Very pleasant Guyanese-speaking 69-year-old male, presents with complaint of chronic low back pain of 7-8 years duration  There is no recent imaging review however he does have history of MRI of the lumbar spine 10/8/13, which does reveal mild bilateral foraminal stenosis L3-L4, L4-L5, and L5-S1 with the central disc herniation at L3-L4 and L4-L5  He reports this time the low back pain particularly affecting his right leg right buttocks is worsening, and is present regardless of position  He has had remote physical therapy and epidural steroid injection neither of which helped  Conservative management with a course of physical therapy is advised and a consultation has been placed  In addition considering the previously identified changes on MRI, plain films of the lumbar spine requested with flexion-extension views as well as a repeat MRI of the lumbar spine which will be performed under general anesthesia secondary to his fairly substantial claustrophobia  In addition the patient is instructed to meet with primary care provider to discuss weight loss  Further follow-up is planned post imaging and/or therapy  These findings, impressions and recommendations are reviewed in great detail with the patient via [employer] [place] , [place], he expressed understanding and agreement, his questions were answered completely and to his satisfaction  Follow up has been scheduled, imaging has been requested, and a course of physical therapy is also been requested  Diagnoses and all orders for this visit:    Chronic bilateral low back pain with right-sided sciatica  -     MRI lumbar spine without contrast; Future  -     Ambulatory referral to Physical Therapy; Future  -     XR spine lumbar complete w bending minimum 6 views;  Future    Chronic midline low back pain, with sciatica presence unspecified  -     Ambulatory referral to Spine Surgery  -     MRI lumbar spine without contrast; Future  -     Ambulatory referral to Physical Therapy; Future  -     XR spine lumbar complete w bending minimum 6 views; Future    Lumbar radiculopathy  -     MRI lumbar spine without contrast; Future  -     Ambulatory referral to Physical Therapy; Future  -     XR spine lumbar complete w bending minimum 6 views; Future    Lumbar foraminal stenosis  -     MRI lumbar spine without contrast; Future  -     Ambulatory referral to Physical Therapy; Future  -     XR spine lumbar complete w bending minimum 6 views; Future          Subjective:      Patient ID: Jayesh East is a 47 y o  male  Pleasant Albanian-speaking 50-year-old male, interviewed and examined with the assistance of medical assistant Jon Pathak  He has complaint of chronic low back pain since approximately 2009, he reports this has been persistently worsening over the years  At this time he has pain regardless of position although it is somewhat improved when he is in recumbent position  The    There is no recent imaging however there had been an MRI of the lumbar spine in 2013  He has a history of a brief course of physical therapy approximately 1 and half years ago as well as consultation with Dr Josephine Wright his IV, spine and pain with epidural steroid injection x1 also about 1 and half years ago either which provided him with any relief  At this time he takes no regular medications for his back pain although he does have a history of gout for which he is on allopurinol and has been seen in the emergency room where he was provided with morphine, Percocet, as well as receiving tramadol and Naprosyn from his primary care provider  He reports he has exacerbations of his gout at least weekly, particularly affecting his right shoulder, right elbow and right hand, with his right elbow being the most problematic  He reports he takes his gout medications which are of some help for his low back pain      He has been disabled since prior to 2013 secondary to his back pain  He also has seen his primary care provider for obesity, who advised him to see a weight loss practice, he has not completed this at this time  He denies diabetes mellitus, coronary artery disease, pulmonary conditions and he reports he is a lifetime nonsmoker  He does have a history of hypertension which is reported as controlled  He denies bowel or bladder incontinence, perineal anesthesia, he does report weakness in his right greater than his left leg, and he does report chronic pain in his right foot which does affect his ambulation  In addition he reports decreased ambulatory distance secondary to leg pain right greater than left            The following portions of the patient's history were reviewed and updated as appropriate: allergies, current medications, past family history, past medical history, past social history and past surgical history  Review of Systems   Constitutional: Positive for activity change  HENT: Negative  Eyes: Negative  Respiratory: Negative  Cardiovascular: Negative  Gastrointestinal: Negative  Endocrine: Negative  Genitourinary: Negative  Musculoskeletal: Positive for back pain (bilateral low back pain radiating to bilateral legs; R>L ) and gait problem (problems walking)  Swelling in hands and feet at times   Skin: Negative  Allergic/Immunologic: Negative  Neurological: Positive for weakness (bilateral legs) and numbness (and tingling in bilateral legs)  Hematological: Negative  Psychiatric/Behavioral: Negative  All other systems reviewed and are negative  Objective:    Physical Exam   Constitutional: He appears well-developed  Morbidly obese with a BMI of 45 6   HENT:   Head: Normocephalic and atraumatic  Neck: Normal range of motion  Cardiovascular: Normal rate, regular rhythm and normal heart sounds      Pulmonary/Chest: Effort normal and breath sounds normal    Musculoskeletal:   Back examination;    Straight legs 90° bilaterally with some discomfort on the left at 90°  There is diffuse tenderness/muscle spasm noted with palpation about the entire low back  Neurological:   Reflex Scores:       Patellar reflexes are 2+ on the right side and 2+ on the left side  Achilles reflexes are 1+ on the right side and 1+ on the left side        Neurologic Exam     Mental Status   Level of consciousness: alert    Motor Exam   Muscle bulk: normal  Overall muscle tone: normal    Strength   Right quadriceps: 5/5  Left quadriceps: 5/5  Right hamstrin/5  Left hamstrin/5  Right glutei: 5/5  Left glutei: 5/5  Right posterior tibial: 5/5  Left posterior tibial: 5/5    Sensory Exam   Light touch normal      Gait, Coordination, and Reflexes     Gait  Gait: shuffling    Reflexes   Right patellar: 2+  Left patellar: 2+  Right achilles: 1+  Left achilles: 1+  Right ankle clonus: absent  Left ankle clonus: absent

## 2018-04-17 ENCOUNTER — ANESTHESIA EVENT (OUTPATIENT)
Dept: RADIOLOGY | Facility: HOSPITAL | Age: 53
End: 2018-04-17

## 2018-04-17 ENCOUNTER — TELEPHONE (OUTPATIENT)
Dept: NEUROSURGERY | Facility: CLINIC | Age: 53
End: 2018-04-17

## 2018-04-17 NOTE — TELEPHONE ENCOUNTER
04/18/2018-CALLED PT (WITH  FROM  [employer]) AND ADVISE PT THAT HIS MRI WAS DENIED BECAUSE HE NEEDS TO COMPLETE PHYSICAL THERAPY/PAIN MANAGEMENT  DISCUSSED W/ED-PA WHO ADVISED PT CAN RESCHEDULE APPT ONCE PHYSICAL THERAPY/PAIN MANAGEMENT WAS COMPLETED  PT UNDERSTOOD, MRI AND F/U APPT WAS CANCELLED     04/17/2018-CALLED PT (WITH  FROM  [employer]) AND LEFT MESSAGE ON MACHINE REQUESTING CALL BACK IN REGARDS TO MRI SCHEDULED FOR TOMORROW     ----- Message from Milagros Grant PA-C sent at 4/17/2018 11:01 AM EDT -----  Peer to Peer completed    With Dr Mika Pena    MRI Lumbar spine, under anesthesia    Denied,    Pt will need to complete a 6 week course of PT without improvement or with worsening symptoms, or Chiropractic regiment without improvement, or a physician directed exercise program, in uninsured patients, and which also may include pain management with exercise regimen without improvement within the last 6 months  IBRAHIMA Ruiz        ----- Message -----  From: Steffen Gregg  Sent: 4/17/2018  10:22 AM  To: Milagros Grant PA-C    PLEASE CALL PA AT PHONE#[phone], OPT#3 TO SEE IF A PEER TO PEER CAN BE DONE TO GET MRI L-SPINE APPROVED  CLINICALS WERE FAXED BACK 04/12/2018 AND ON 04/16/2018 PER RADMD NOT APPROVED  PT HAS [employer] AND MRI L-SPINE IS SCHEDULED FOR TOMORROW (04/18/2018) @ 1:00pm AT [facility]    CALLED PA AND THERE SYSTEM IS DOWN, BUT TO CALL IN ABOUT A 1/2 HOUR TO SEE IF THEY ARE BACK UP AND RUNNING

## 2018-04-18 ENCOUNTER — HOSPITAL ENCOUNTER (OUTPATIENT)
Dept: RADIOLOGY | Facility: HOSPITAL | Age: 53
Discharge: HOME | End: 2018-04-18

## 2018-04-18 ENCOUNTER — ANESTHESIA (OUTPATIENT)
Dept: RADIOLOGY | Facility: HOSPITAL | Age: 53
End: 2018-04-18

## 2018-04-18 NOTE — ANESTHESIA PREPROCEDURE EVALUATION
Review of Systems/Medical History          Cardiovascular  Hyperlipidemia, Hypertension ,    Pulmonary       GI/Hepatic    GERD ,  Hiatal hernia,             Endo/Other    Obesity (BMI-45)    GYN       Hematology   Musculoskeletal    Arthritis     Neurology   Psychology       Comment: Claustrophobia         Lab Results   Component Value Date    GLUCOSE 104 12/13/2016    ALT 43 03/28/2018    AST 25 03/28/2018    BUN 14 03/28/2018    CALCIUM 8 9 03/28/2018     03/28/2018    CHOL 230 (H) 03/28/2018    CO2 27 03/28/2018    CREATININE 0 75 03/28/2018    HDL 40 03/28/2018    HCT 48 5 07/11/2017    HGB 17 1 (H) 07/11/2017    PROT 8 0 03/28/2018    HGBA1C 6 1 03/28/2018     07/11/2017    K 3 5 03/28/2018    PSA 0 5 06/16/2015     03/28/2018    TRIG 166 (H) 03/28/2018    WBC 8 38 07/11/2017            Anesthesia Plan  ASA Score- 3     Anesthesia Type- general with ASA Monitors  Additional Monitors:   Airway Plan:         Plan Factors-    Induction- intravenous  Postoperative Plan-     Informed Consent- Anesthetic plan and risks discussed with patient

## 2018-05-08 ENCOUNTER — TELEPHONE (OUTPATIENT)
Dept: GASTROENTEROLOGY | Facility: CLINIC | Age: 53
End: 2018-05-08

## 2018-05-08 ENCOUNTER — OFFICE VISIT (OUTPATIENT)
Dept: GASTROENTEROLOGY | Facility: CLINIC | Age: 53
End: 2018-05-08
Payer: COMMERCIAL

## 2018-05-08 VITALS
HEIGHT: 65 IN | HEART RATE: 73 BPM | SYSTOLIC BLOOD PRESSURE: 143 MMHG | DIASTOLIC BLOOD PRESSURE: 93 MMHG | TEMPERATURE: 97.2 F | WEIGHT: 276 LBS | BODY MASS INDEX: 45.98 KG/M2

## 2018-05-08 DIAGNOSIS — K25.9 GASTRIC ULCER WITHOUT HEMORRHAGE OR PERFORATION, UNSPECIFIED CHRONICITY: ICD-10-CM

## 2018-05-08 DIAGNOSIS — D12.5 ADENOMATOUS POLYP OF SIGMOID COLON: ICD-10-CM

## 2018-05-08 DIAGNOSIS — K44.9 HIATAL HERNIA: ICD-10-CM

## 2018-05-08 DIAGNOSIS — K21.9 GASTROESOPHAGEAL REFLUX DISEASE WITHOUT ESOPHAGITIS: Primary | ICD-10-CM

## 2018-05-08 DIAGNOSIS — Z12.11 SCREENING FOR COLON CANCER: ICD-10-CM

## 2018-05-08 DIAGNOSIS — R79.89 ELEVATED LFTS: ICD-10-CM

## 2018-05-08 PROBLEM — Z86.0100 HISTORY OF COLONIC POLYPS: Status: ACTIVE | Noted: 2018-05-08

## 2018-05-08 PROBLEM — Z86.010 HISTORY OF COLONIC POLYPS: Status: ACTIVE | Noted: 2018-05-08

## 2018-05-08 PROCEDURE — 99244 OFF/OP CNSLTJ NEW/EST MOD 40: CPT | Performed by: INTERNAL MEDICINE

## 2018-05-08 NOTE — LETTER
May 8, 2018     Clemente Mcgrath MD  [address] [employer]  [address] [facility]  [address] [address]    Patient: Jerry Orellana   YOB: 1965   Date of Visit: 5/8/2018       Dear Dr Bass Proud: Thank you for referring Joan Mukherjee to me for evaluation  Below are my notes for this consultation  If you have questions, please do not hesitate to call me  I look forward to following your patient along with you  Sincerely,        Vihs Meng MD        CC: No Recipients  Vish Meng MD  5/8/2018 11:31 AM  Sign at close encounter  [facility] - Outpatient Consultation  Hiwot Jennings 47 y o  male MRN: [id number]  Encounter: [id number]          ASSESSMENT AND PLAN:      1  Screening for colon cancer- he had previous history of colonic polyps with last colonoscopy done in 2013 due for another in 2016 but he was lost to follow-up  Due to previous history of polyps in 2013 presents here for colonoscopy evaluation   - Ambulatory referral to Gastroenterology  - Case request operating room: EGD AND COLONOSCOPY; Standing  - polyethylene glycol (GOLYTELY) 4000 mL solution; Take 4,000 mL by mouth once for 1 dose  Dispense: 4000 mL; Refill: 0  - Case request operating room: EGD AND COLONOSCOPY    2  Gastroesophageal reflux disease without esophagitis- symptoms are triggered by common reflexogenic foods  He has been avoiding triggers  On omeprazole he has had improvement symptoms  Symptoms are once a week  - Case request operating room: EGD AND COLONOSCOPY; Standing  - Case request operating room: EGD AND COLONOSCOPY    3  Hiatal hernia- he has previous history of this will evaluate on EGD last EGD was in 2013   - Case request operating room: EGD AND COLONOSCOPY; Standing  - Case request operating room: EGD AND COLONOSCOPY    4  Elevated LFTs- this may be secondary to socially consuming alcohol over the recommended amount which I emphasized during this visit to decrease    We also discuss decreasing weight as fatty liver disease may be causing transient elevation  Will check for hepatitis panel  No evaluation planned as last LFTs within normal limits  - Chronic Hepatitis Panel; Future    5  Adenomatous polyp of sigmoid colon- last colonoscopy was in 2013  Will plan for repeat 1 now as he is over due for 1   - Case request operating room: EGD AND COLONOSCOPY; Standing  - polyethylene glycol (GOLYTELY) 4000 mL solution; Take 4,000 mL by mouth once for 1 dose  Dispense: 4000 mL; Refill: 0  - Case request operating room: EGD AND COLONOSCOPY    6  Gastric ulcer without hemorrhage or perforation, unspecified chronicity- last EGD was in 2013 which showed gastric ulcers  H pylori evaluation 2015 was negative  Will plan for repeat EGD for further evaluation especially in setting of epigastric discomfort   - Case request operating room: EGD AND COLONOSCOPY; Standing  - Case request operating room: EGD AND COLONOSCOPY    ______________________________________________________________________    HPI:     He is a very pleasant 60-year-old male presents here for evaluation epigastric discomfort, history of gastric ulcers, history of GERD, history of colonic polyps  He also in setting was found to have elevation LFTs which have normalized on the recent blood work  ALT was elevated 2 times upper limit of normal     Primary cause for his presentations for colonoscopy and EGD evaluation  Last colonoscopy was in 2013 which showed 1 colonic polyp and colonoscopy prior to that showed 2 colonic polyps  He was due for colonoscopy in 2016 when he was lost to follow-up  He is not having any overt symptoms at this time and is lower abdominal region  Denies any constipation, overt bleeding  Overall feels well  He did have an endoscopy in 2013 as well which showed gastric ulcer disease not have a repeat 1 after that  Stool studies in 2015 were negative for H pylori    Currently he is on omeprazole once daily with good control of his GERD symptoms and abdominal discomfort  He will still have abdominal discomfort low can epigastric region which does not radiate without any clear triggers  EGD will be planned in further evaluation will be planned based on this  He does drink excess amount of alcohol more than 12 recommended drinks discuss decreasing the amount  No family history of colorectal cancer  REVIEW OF SYSTEMS:    CONSTITUTIONAL: Denies any fever, chills, rigors, and weight loss  HEENT: No earache or tinnitus  Denies hearing loss or visual disturbances  CARDIOVASCULAR: No chest pain or palpitations  RESPIRATORY: Denies any cough, hemoptysis, shortness of breath or dyspnea on exertion  GASTROINTESTINAL: As noted in the History of Present Illness  GENITOURINARY: No problems with urination  Denies any hematuria or dysuria  NEUROLOGIC: No dizziness or vertigo, denies headaches  MUSCULOSKELETAL: Denies any muscle or joint pain  SKIN: Denies skin rashes or itching  ENDOCRINE: Denies excessive thirst  Denies intolerance to heat or cold  PSYCHOSOCIAL: Denies depression or anxiety  Denies any recent memory loss         Historical Information   Past Medical History:   Diagnosis Date    Arthritis     GERD (gastroesophageal reflux disease)     Gout     Gout     Hypercholesteremia     Hypertension     Low back pain      Past Surgical History:   Procedure Laterality Date    COLONOSCOPY       Social History   History   Alcohol Use    Yes     Comment: occasionally / social      History   Drug Use No     History   Smoking Status    Never Smoker   Smokeless Tobacco    Never Used     Family History   Problem Relation Age of Onset    Diabetes Mother     Seizures Mother     No Known Problems Father     No Known Problems Family        Meds/Allergies       Current Outpatient Prescriptions:     allopurinol (ZYLOPRIM) 300 mg tablet    amLODIPine (NORVASC) 2 5 mg tablet    fenofibrate (TRIGLIDE) 160 MG tablet    lisinopril (ZESTRIL) 30 mg tablet    lovastatin (MEVACOR) 40 MG tablet    omeprazole (PriLOSEC) 40 MG capsule    acetaminophen (TYLENOL) 500 mg tablet    polyethylene glycol (GOLYTELY) 4000 mL solution    No Known Allergies        Objective     Blood pressure 143/93, pulse 73, temperature (!) 97 2 °F (36 2 °C), temperature source Tympanic, height 5' 5 1" (1 654 m), weight 125 kg (276 lb)  Body mass index is 45 79 kg/m²  PHYSICAL EXAM:      General Appearance:   Alert, cooperative, no distress   HEENT:   Normocephalic, atraumatic, anicteric      Neck:  Supple, symmetrical, trachea midline   Lungs:   Clear to auscultation bilaterally; no rales, rhonchi or wheezing; respirations unlabored    Heart[de-identified]   Regular rate and rhythm; no murmur, rub, or gallop  Abdomen:   Soft, non-tender, non-distended; normal bowel sounds; no masses, no organomegaly    Genitalia:   Deferred    Rectal:   Deferred    Extremities:  No cyanosis, clubbing or edema    Pulses:  2+ and symmetric    Skin:  No jaundice, rashes, or lesions    Lymph nodes:  No palpable cervical lymphadenopathy        Lab Results:   No visits with results within 1 Day(s) from this visit     Latest known visit with results is:   Appointment on 03/28/2018   Component Date Value    Hemoglobin A1C 03/28/2018 6 1     EAG 03/28/2018 128     Sodium 03/28/2018 139     Potassium 03/28/2018 3 5     Chloride 03/28/2018 105     CO2 03/28/2018 27     Anion Gap 03/28/2018 7     BUN 03/28/2018 14     Creatinine 03/28/2018 0 75     Glucose, Fasting 03/28/2018 94     Calcium 03/28/2018 8 9     AST 03/28/2018 25     ALT 03/28/2018 43     Alkaline Phosphatase 03/28/2018 104     Total Protein 03/28/2018 8 0     Albumin 03/28/2018 3 5     Total Bilirubin 03/28/2018 0 42     eGFR 03/28/2018 106     Uric Acid 03/28/2018 8 4*    Cholesterol 03/28/2018 230*    Triglycerides 03/28/2018 166*    HDL, Direct 03/28/2018 40     LDL Calculated 03/28/2018 157*    TSH 3RD [facility] 03/28/2018 3 410          Radiology Results:   No results found

## 2018-05-08 NOTE — PATIENT INSTRUCTIONS
Colonoscopy   AMBULATORY CARE:   What you need to know about a colonoscopy:  A colonoscopy is a procedure to examine the inside of your colon (intestine) with a scope  A scope is a flexible tube with a small light and camera on the end  Polyps or tissue growths may be removed during your colonoscopy  What you need to do the week before your colonoscopy: You will need to stop taking medicines that contain aspirin or iron for 7 days before your colonoscopy  If you take anticoagulants, such as warfarin, ask when you should stop taking it  Make plans for someone to drive you home after your procedure  How to prepare for your colonoscopy: Your healthcare provider will have you prepare your bowels before your procedure  Your bowels will need to be empty before your procedure to allow him to clearly see your colon  You will need to do the following the day before your procedure:  · Have only clear liquids  for the entire day before your colonoscopy  Clear liquid diet includes clear fruit juices and broths, clear flavored gelatin, and hard candy  It also includes coffee, tea, carbonated beverages, and clear sports drinks  · Follow your bowel prep as directed  There are many different preparations that can be given before a colonoscopy  Some are given over 2 hours and others over 6 hours  Some are given earlier in the afternoon the day before the colonoscopy  Others are given the day before and then the morning of the colonoscopy  With any bowel prep, stay close to the bathroom  This liquid will cause your bowels to move frequently  · An enema  may be needed  Your healthcare provider may tell you to use an enema to help clean out your bowels  · Do not eat or drink anything after midnight  This will help prevent problems that can happen if you vomit while under anesthesia  What will happen during your colonoscopy:   · You will be given medicine to help you relax   You will lie on your left side and raise one or both knees toward your chest  Your healthcare provider will examine your anus and use a finger to check your rectum  You may need another enema if your bowel is not empty  The scope will be lubricated and gently placed into your anus  It will then be passed through your rectum and into your colon  Water or air will be put into your colon to help clean or expand it  This is done so your healthcare provider can see your colon clearly  · Tissue samples may be taken from the walls of your bowel and sent to a lab for tests  If you have a polyp, your healthcare provider will pass a wire loop through the scope and use it to hold the polyp  The polyp is then burned or cut off the wall of your colon  Removed polyps are sent to a lab for tests  Pictures of your colon may be taken during the procedure  The scope will be removed when the procedure is done  What will happen after your colonoscopy:   · Rest after your procedure  You may feel bloated, have some gas and abdominal discomfort  You may need to lie on your right side with a heating pad on your abdomen  You may need to take short walks to help move the gas out  Eat small meals, if you feel bloated  Do not drive or make important decisions until the day after your procedure  · You may have polyps removed  Do not take aspirin or go on long car trips for 7 days after your procedure  Ask your healthcare provider about any other limits after your procedure  Risks of a colonoscopy: You may have pain or bleeding after the scope or polyps are removed  You may also have a slow heartbeat, decreased blood pressure, or increased sweating  Your colon may tear due to the increased pressure from the scope and other instruments  This may cause bowel contents to leak out of your colon and into your abdomen  If this happens, you will need to stay in the hospital and have surgery on your colon     Seek care immediately if:   · You have a large amount of bright red blood in your bowel movements  · Your abdomen is hard and firm and you have severe pain  · You have sudden trouble breathing  Contact your healthcare provider if:   · You develop a rash or hives  · You have a fever within 24 hours of your procedure  · You have not had a bowel movement for 3 days after your procedure  · You have questions or concerns about your condition or care  Activity:   · Do not lift, strain, or run  for 3 days after your procedure  · Rest after your procedure  You have been given medicine to relax you  Do not  drive or make important decisions until the day after your procedure  Return to your normal activity as directed  · Relieve gas and discomfort from bloating  by lying on your right side with a heating pad on your abdomen  You may need to take short walks to help the gas move out  Eat small meals until bloating is relieved  If you had polyps removed: For 7 days after your procedure:  · Do not  take aspirin  · Do not  go on long car rides  Help prevent constipation:   · Eat a variety of healthy foods  Healthy foods include fruit, vegetables, whole-grain breads, low-fat dairy products, beans, lean meat, and fish  Ask if you need to be on a special diet  Your healthcare provider may recommend that you eat high-fiber foods such as cooked beans  Fiber helps you have regular bowel movements  · Drink liquids as directed  Adults should drink between 9 and 13 eight-ounce cups of liquid every day  Ask what amount is best for you  For most people, good liquids to drink are water, juice, and milk  · Exercise as directed  Talk to your healthcare provider about the best exercise plan for you  Exercise can help prevent constipation, decrease your blood pressure and improve your health  Follow up with your healthcare provider as directed:  Write down your questions so you remember to ask them during your visits     © 2017 Lila[employer] Nicolas Owens Information is for End User's use only and may not be sold, redistributed or otherwise used for commercial purposes  All illustrations and images included in CareNotes® are the copyrighted property of [employer]  or Steve Jc  The above information is an  only  It is not intended as medical advice for individual conditions or treatments  Talk to your doctor, nurse or pharmacist before following any medical regimen to see if it is safe and effective for you  Endoscopia superior   CUIDADO AMBULATORIO:   Lo que necesita saber acerca de la endoscopia superior:  Jaida endoscopia superior también se conoce nu endoscopia gastrointestinal (GI) superior o esofagogastroduodenoscopia (EGD)  Se utiliza un endoscopio (tubo hassan y flexible con Diane Slater) para examinar las ward de los intestinos superiores de austin hijo  El intestino hassan incluye el esófago, el estómago y el duodeno (primera parte del intestino hassan)  Jaida endoscopia superior se [place] para determinar si hay problemas, nu sangrado, pólipos, úlceras o infección  Cómo debe prepararse para jaida endoscopia superior:  [place] médico hablará con usted acerca de cómo prepararse para austin procedimiento  Es posible que no deba comer ni beber nada, excepto agua, nate 6 a 12 horas antes del procedimiento  Le dirán qué medicamentos oleksandr o no oleksandr el día de austin procedimiento  [place] a alguien que lo lleve a austin casa  Qué sucederá nate jaida endoscopía superior:   · A usted le darán medicamento a través de austin vía intravenosa para ayudarlo a relajarse y para provocarle somnolencia  A usted también le darán [place] para adormecer austin garganta  Es posible que usted necesite usar jaida pieza bucal para ayudarlo a mantener austin boca abierta y proteger mayi dientes y lengua  [place] médico introducirá cuidadosamente el endoscopio a través de austin boca y lo bajará por austin garganta   Es posible que le pidan que trague jaida vez para que ayude a  el endoscopio dentro de mayi intestinos superiores  Usted podría sentir presión en jacobo garganta kyle no debería sentir dolor  El endoscopio no restringe jacobo respiración  · Jacobo médico observará el endoscopio en un monitor  Él tomará imágenes con el endoscopio  Podría inyectar aire cuidadosamente para que pueda jose luis jacobo tracto digestivo claramente  Jacobo médico podría oleksandr muestras de tejido y mandarlas al laboratorio para jacobo examinación  Podría remover objetos extraños, tumores o pólipos que pudieran estar obstruyendo mayi intestinos superiores  Jacobo médico también podría introducir instrumentos con el endoscopio para tratar el sangrado o para colocar un stent (sonda)  Cuando termine el procedimiento, el endoscopio será removido lentamente  Qué sucederá después de jaida endoscopía superior:  Usted podría sentirse inflamado, con gases o tener molestia abdominal  Jacobo garganta podría estar adolorida por 24 a 39 horas después del procedimiento  Usted podría eructar o expulsar gases debido al aire que todavía está dentro de jacobo cuerpo después del procedimiento  Posiblemente necesite caminar un poco para ayudarse a expulsar los gases  Si se siente inflamado, coma comidas pequeñas  No maneje o tome decisiones importantes hasta el día siguiente de jacobo procedimiento  Los riesgos de jaida endoscopía superior? Jacobo esófago, estómago o duodeno podrían ser perforados o desgarrados nate el procedimiento  [place] es debido al [employer] de la presión conforme el endoscopio y el aire [place] Flatter  Usted podría sangrar más de lo esperado o contraer jaida infección  Usted podría tener latido cardíaco lento o irregular o presión arterial baja  [place] puede provocar sudor y [place]  Los líquidos podrían entrar a mayi pulmones y usted tener dificultad para respirar  Estos problemas pueden ser mortales  Llame al 911 si presenta:   · Tiene dolor en el pecho o dificultad para respirar de forma repentina      Busque atención médica de inmediato si:   · Está mareado o siente que se va a desmayar  · Usted tiene dificultad para tragar  · Usted tiene dolor de garganta intenso  · Isabel evacuaciones intestinales son [place]ys [place]s o negras  · [place] abdomen está epi y firme y usted siente dolor intenso  · Usted vomita ismael  Pregúntele a austin Celina Clayman vitaminas y minerales son adecuados para usted  · Usted se siente lleno o hinchado y no puede eructar o expulsar gas  · Usted no ha tenido jaida evacuación intestinal después de 3 días de austin procedimiento  · Usted tiene dolor de ladarius  · Usted tiene fiebre o escalofríos  · Usted tiene náuseas o está vomitando  · Usted tiene salpullido o urticaria  · Usted tiene preguntas o inquietudes acerca de austin endoscopia  Alivie el dolor de garganta:  Chupe pastillas para la garganta o hielo triturado  Cari gárgaras con jaida pequeña cantidad de agua tibia con sal  Mezcle 1 cucharadita de sal y 1 taza de agua tibia para hacer agua salada  Alivie el gas y la molestia de la inflamación:  Acuéstese sobre austin costado derecho con jaida almohada térmica sobre austin abdomen  Camine un poco para ayudar a expulsar el gas  Coma comidas pequeñas hasta que se alivie de la inflamación  Descanse después de austin procedimiento:  A usted le tanner administrado medicamento para relajarse  No  maneje o tome decisiones importantes hasta el día siguiente de austin procedimiento  Regrese a isabel actividades normales según le indiquen  Usted generalmente puede regresar al [place] al día siguiente de austin procedimiento  Acuda a isabel consultas de control con austin médico según le indicaron  Anote isabel preguntas para que se acuerde de hacerlas nate isabel visitas  © 2017 [address] Nicolas Owens Information is for End User's use only and may not be sold, redistributed or otherwise used for commercial purposes  All illustrations and images included in CareNotes® are the copyrighted property of A D A M , Inc  or Steve Jc    Esta información es sólo para uso en educación  Jacobo intención no es darle un consejo médico sobre enfermedades o tratamientos  Colsulte con jacobo Melven Rimes farmacéutico antes de seguir cualquier régimen médico para saber si es seguro y efectivo para usted  EGD/COLON scheduled with Dr Mejia at [facility]10[facility]  I gave pt verbal instructions/paper work given   Prep Golytely/Dulcolax

## 2018-05-08 NOTE — PROGRESS NOTES
[facility] - Outpatient Consultation  Kali Jennings 47 y o  male MRN: [id number]  Encounter: [id number]          ASSESSMENT AND PLAN:      1  Screening for colon cancer- he had previous history of colonic polyps with last colonoscopy done in 2013 due for another in 2016 but he was lost to follow-up  Due to previous history of polyps in 2013 presents here for colonoscopy evaluation   - Ambulatory referral to Gastroenterology  - Case request operating room: EGD AND COLONOSCOPY; Standing  - polyethylene glycol (GOLYTELY) 4000 mL solution; Take 4,000 mL by mouth once for 1 dose  Dispense: 4000 mL; Refill: 0  - Case request operating room: EGD AND COLONOSCOPY    2  Gastroesophageal reflux disease without esophagitis- symptoms are triggered by common reflexogenic foods  He has been avoiding triggers  On omeprazole he has had improvement symptoms  Symptoms are once a week  - Case request operating room: EGD AND COLONOSCOPY; Standing  - Case request operating room: EGD AND COLONOSCOPY    3  Hiatal hernia- he has previous history of this will evaluate on EGD last EGD was in 2013   - Case request operating room: EGD AND COLONOSCOPY; Standing  - Case request operating room: EGD AND COLONOSCOPY    4  Elevated LFTs- this may be secondary to socially consuming alcohol over the recommended amount which I emphasized during this visit to decrease  We also discuss decreasing weight as fatty liver disease may be causing transient elevation  Will check for hepatitis panel  No evaluation planned as last LFTs within normal limits  - Chronic Hepatitis Panel; Future    5  Adenomatous polyp of sigmoid colon- last colonoscopy was in 2013  Will plan for repeat 1 now as he is over due for 1   - Case request operating room: EGD AND COLONOSCOPY; Standing  - polyethylene glycol (GOLYTELY) 4000 mL solution;  Take 4,000 mL by mouth once for 1 dose  Dispense: 4000 mL; Refill: 0  - Case request operating room: EGD AND COLONOSCOPY    6  Gastric ulcer without hemorrhage or perforation, unspecified chronicity- last EGD was in 2013 which showed gastric ulcers  H pylori evaluation 2015 was negative  Will plan for repeat EGD for further evaluation especially in setting of epigastric discomfort   - Case request operating room: EGD AND COLONOSCOPY; Standing  - Case request operating room: EGD AND COLONOSCOPY    ______________________________________________________________________    HPI:     He is a very pleasant 71-year-old male presents here for evaluation epigastric discomfort, history of gastric ulcers, history of GERD, history of colonic polyps  He also in setting was found to have elevation LFTs which have normalized on the recent blood work  ALT was elevated 2 times upper limit of normal     Primary cause for his presentations for colonoscopy and EGD evaluation  Last colonoscopy was in 2013 which showed 1 colonic polyp and colonoscopy prior to that showed 2 colonic polyps  He was due for colonoscopy in 2016 when he was lost to follow-up  He is not having any overt symptoms at this time and is lower abdominal region  Denies any constipation, overt bleeding  Overall feels well  He did have an endoscopy in 2013 as well which showed gastric ulcer disease not have a repeat 1 after that  Stool studies in 2015 were negative for H pylori  Currently he is on omeprazole once daily with good control of his GERD symptoms and abdominal discomfort  He will still have abdominal discomfort low can epigastric region which does not radiate without any clear triggers  EGD will be planned in further evaluation will be planned based on this  He does drink excess amount of alcohol more than 12 recommended drinks discuss decreasing the amount  No family history of colorectal cancer  REVIEW OF SYSTEMS:    CONSTITUTIONAL: Denies any fever, chills, rigors, and weight loss  HEENT: No earache or tinnitus   Denies hearing loss or visual disturbances  CARDIOVASCULAR: No chest pain or palpitations  RESPIRATORY: Denies any cough, hemoptysis, shortness of breath or dyspnea on exertion  GASTROINTESTINAL: As noted in the History of Present Illness  GENITOURINARY: No problems with urination  Denies any hematuria or dysuria  NEUROLOGIC: No dizziness or vertigo, denies headaches  MUSCULOSKELETAL: Denies any muscle or joint pain  SKIN: Denies skin rashes or itching  ENDOCRINE: Denies excessive thirst  Denies intolerance to heat or cold  PSYCHOSOCIAL: Denies depression or anxiety  Denies any recent memory loss  Historical Information   Past Medical History:   Diagnosis Date    Arthritis     GERD (gastroesophageal reflux disease)     Gout     Gout     Hypercholesteremia     Hypertension     Low back pain      Past Surgical History:   Procedure Laterality Date    COLONOSCOPY       Social History   History   Alcohol Use    Yes     Comment: occasionally / social      History   Drug Use No     History   Smoking Status    Never Smoker   Smokeless Tobacco    Never Used     Family History   Problem Relation Age of Onset    Diabetes Mother     Seizures Mother     No Known Problems Father     No Known Problems Family        Meds/Allergies       Current Outpatient Prescriptions:     allopurinol (ZYLOPRIM) 300 mg tablet    amLODIPine (NORVASC) 2 5 mg tablet    fenofibrate (TRIGLIDE) 160 MG tablet    lisinopril (ZESTRIL) 30 mg tablet    lovastatin (MEVACOR) 40 MG tablet    omeprazole (PriLOSEC) 40 MG capsule    acetaminophen (TYLENOL) 500 mg tablet    polyethylene glycol (GOLYTELY) 4000 mL solution    No Known Allergies        Objective     Blood pressure 143/93, pulse 73, temperature (!) 97 2 °F (36 2 °C), temperature source Tympanic, height 5' 5 1" (1 654 m), weight 125 kg (276 lb)  Body mass index is 45 79 kg/m²          PHYSICAL EXAM:      General Appearance:   Alert, cooperative, no distress   HEENT:   Normocephalic, atraumatic, anicteric      Neck:  Supple, symmetrical, trachea midline   Lungs:   Clear to auscultation bilaterally; no rales, rhonchi or wheezing; respirations unlabored    Heart[de-identified]   Regular rate and rhythm; no murmur, rub, or gallop  Abdomen:   Soft, non-tender, non-distended; normal bowel sounds; no masses, no organomegaly    Genitalia:   Deferred    Rectal:   Deferred    Extremities:  No cyanosis, clubbing or edema    Pulses:  2+ and symmetric    Skin:  No jaundice, rashes, or lesions    Lymph nodes:  No palpable cervical lymphadenopathy        Lab Results:   No visits with results within 1 Day(s) from this visit  Latest known visit with results is:   Appointment on 03/28/2018   Component Date Value    Hemoglobin A1C 03/28/2018 6 1     EAG 03/28/2018 128     Sodium 03/28/2018 139     Potassium 03/28/2018 3 5     Chloride 03/28/2018 105     CO2 03/28/2018 27     Anion Gap 03/28/2018 7     BUN 03/28/2018 14     Creatinine 03/28/2018 0 75     Glucose, Fasting 03/28/2018 94     Calcium 03/28/2018 8 9     AST 03/28/2018 25     ALT 03/28/2018 43     Alkaline Phosphatase 03/28/2018 104     Total Protein 03/28/2018 8 0     Albumin 03/28/2018 3 5     Total Bilirubin 03/28/2018 0 42     eGFR 03/28/2018 106     Uric Acid 03/28/2018 8 4*    Cholesterol 03/28/2018 230*    Triglycerides 03/28/2018 166*    HDL, Direct 03/28/2018 40     LDL Calculated 03/28/2018 157*    TSH 3RD GENERATON 03/28/2018 3 410          Radiology Results:   No results found

## 2018-05-09 ENCOUNTER — APPOINTMENT (OUTPATIENT)
Dept: LAB | Facility: HOSPITAL | Age: 53
End: 2018-05-09
Attending: INTERNAL MEDICINE
Payer: COMMERCIAL

## 2018-05-09 DIAGNOSIS — R79.89 ELEVATED LFTS: ICD-10-CM

## 2018-05-09 LAB
HBV CORE AB SER QL: NORMAL
HBV CORE IGM SER QL: NORMAL
HBV SURFACE AG SER QL: NORMAL
HCV AB SER QL: NORMAL

## 2018-05-09 PROCEDURE — 87340 HEPATITIS B SURFACE AG IA: CPT

## 2018-05-09 PROCEDURE — 36415 COLL VENOUS BLD VENIPUNCTURE: CPT

## 2018-05-09 PROCEDURE — 86803 HEPATITIS C AB TEST: CPT

## 2018-05-09 PROCEDURE — 86705 HEP B CORE ANTIBODY IGM: CPT

## 2018-05-09 PROCEDURE — 86704 HEP B CORE ANTIBODY TOTAL: CPT

## 2018-05-09 NOTE — TELEPHONE ENCOUNTER
I spoke with pt he was wanting to know procedure time  Pt aware he will get a call the night prior after 3pm with arrival time

## 2018-05-16 ENCOUNTER — HOSPITAL ENCOUNTER (EMERGENCY)
Facility: HOSPITAL | Age: 53
Discharge: HOME | End: 2018-05-16
Attending: EMERGENCY MEDICINE | Admitting: EMERGENCY MEDICINE
Payer: COMMERCIAL

## 2018-05-16 VITALS
DIASTOLIC BLOOD PRESSURE: 78 MMHG | BODY MASS INDEX: 40.16 KG/M2 | HEART RATE: 78 BPM | OXYGEN SATURATION: 97 % | WEIGHT: 265 LBS | TEMPERATURE: 97.8 F | SYSTOLIC BLOOD PRESSURE: 149 MMHG | RESPIRATION RATE: 16 BRPM | HEIGHT: 68 IN

## 2018-05-16 DIAGNOSIS — M10.9 GOUT ATTACK: Primary | ICD-10-CM

## 2018-05-16 LAB
CRYSTALS SNV QL MICRO: NORMAL
MONOCYTES NFR SNV MANUAL: 14 %
NEUTROPHILS NFR SNV MANUAL: 86 %
SITE: NORMAL
TOTAL CELLS COUNTED SPEC: 100
URATE SERPL-MCNC: 7.5 MG/DL (ref 4.2–8)
WBC # FLD MANUAL: NORMAL /UL

## 2018-05-16 PROCEDURE — 89060 EXAM SYNOVIAL FLUID CRYSTALS: CPT | Performed by: EMERGENCY MEDICINE

## 2018-05-16 PROCEDURE — 87205 SMEAR GRAM STAIN: CPT | Performed by: EMERGENCY MEDICINE

## 2018-05-16 PROCEDURE — 99283 EMERGENCY DEPT VISIT LOW MDM: CPT

## 2018-05-16 PROCEDURE — 89051 BODY FLUID CELL COUNT: CPT | Performed by: EMERGENCY MEDICINE

## 2018-05-16 PROCEDURE — 87070 CULTURE OTHR SPECIMN AEROBIC: CPT | Performed by: EMERGENCY MEDICINE

## 2018-05-16 PROCEDURE — 84550 ASSAY OF BLOOD/URIC ACID: CPT | Performed by: EMERGENCY MEDICINE

## 2018-05-16 PROCEDURE — 36415 COLL VENOUS BLD VENIPUNCTURE: CPT | Performed by: EMERGENCY MEDICINE

## 2018-05-16 RX ORDER — LIDOCAINE HYDROCHLORIDE 10 MG/ML
5 INJECTION, SOLUTION EPIDURAL; INFILTRATION; INTRACAUDAL; PERINEURAL ONCE
Status: COMPLETED | OUTPATIENT
Start: 2018-05-16 | End: 2018-05-16

## 2018-05-16 RX ORDER — ACETAMINOPHEN 325 MG/1
650 TABLET ORAL ONCE
Status: DISCONTINUED | OUTPATIENT
Start: 2018-05-16 | End: 2018-05-16

## 2018-05-16 RX ORDER — TRIAMCINOLONE ACETONIDE 40 MG/ML
40 INJECTION, SUSPENSION INTRA-ARTICULAR; INTRAMUSCULAR ONCE
Status: COMPLETED | OUTPATIENT
Start: 2018-05-16 | End: 2018-05-16

## 2018-05-16 RX ORDER — OXYCODONE HYDROCHLORIDE 5 MG/1
5 TABLET ORAL EVERY 6 HOURS PRN
Qty: 8 TABLET | Refills: 0 | Status: SHIPPED | OUTPATIENT
Start: 2018-05-16 | End: 2018-07-02 | Stop reason: SINTOL

## 2018-05-16 RX ORDER — BUPIVACAINE HYDROCHLORIDE 5 MG/ML
5 INJECTION, SOLUTION EPIDURAL; INTRACAUDAL ONCE
Status: COMPLETED | OUTPATIENT
Start: 2018-05-16 | End: 2018-05-16

## 2018-05-16 RX ORDER — IBUPROFEN 600 MG/1
600 TABLET ORAL EVERY 6 HOURS PRN
Qty: 30 TABLET | Refills: 0 | Status: SHIPPED | OUTPATIENT
Start: 2018-05-16 | End: 2018-07-02

## 2018-05-16 RX ADMIN — BUPIVACAINE HYDROCHLORIDE 5 ML: 5 INJECTION, SOLUTION EPIDURAL; INTRACAUDAL at 07:39

## 2018-05-16 RX ADMIN — TRIAMCINOLONE ACETONIDE 40 MG: 40 INJECTION, SUSPENSION INTRA-ARTICULAR; INTRAMUSCULAR at 07:39

## 2018-05-16 RX ADMIN — LIDOCAINE HYDROCHLORIDE 5 ML: 10 INJECTION, SOLUTION EPIDURAL; INFILTRATION; INTRACAUDAL; PERINEURAL at 07:40

## 2018-05-16 NOTE — ED PROVIDER NOTES
History  Chief Complaint   Patient presents with    Knee Pain     Pt c/o left knee pain from gout  Pt states has hx  of gout and arthritis     46 yo M presents to ED with 2 day history of knee pain and swelling  Pt says he has history of gout and this is similar, but he says the pain is worse than his usual gout  He also reports subjective fever 2 days ago at symptom onset  Pain worse with walking  Pt says he has had gout before in his elbow and received prednisone, morphine, and an injection of something into the elbow, which helped his pain  Pt denies any trauma or injury to the knee prior to onset of pain  Prior to Admission Medications   Prescriptions Last Dose Informant Patient Reported? Taking?   acetaminophen (TYLENOL) 500 mg tablet  Self No No   Sig: Take 2 tablets by mouth every 6 (six) hours as needed for mild pain   allopurinol (ZYLOPRIM) 300 mg tablet  Self No No   Sig: TAKE ONE TABLET BY MOUTH TWICE A DAY   amLODIPine (NORVASC) 2 5 mg tablet  Self Yes No   Sig: Take 2 5 mg by mouth daily  fenofibrate (TRIGLIDE) 160 MG tablet  Self No No   Sig: TAKE ONE TABLET BY MOUTH DAILY   lisinopril (ZESTRIL) 30 mg tablet  Self No No   Sig: Take 1 tablet (30 mg total) by mouth daily   lovastatin (MEVACOR) 40 MG tablet  Self Yes No   Sig: Take 1 tablet by mouth   omeprazole (PriLOSEC) 40 MG capsule  Self Yes No   Sig: Take 40 mg by mouth daily     polyethylene glycol (GOLYTELY) 4000 mL solution   No No   Sig: Take 4,000 mL by mouth once for 1 dose      Facility-Administered Medications: None       Past Medical History:   Diagnosis Date    Arthritis     GERD (gastroesophageal reflux disease)     Gout     Gout     Hypercholesteremia     Hypertension     Low back pain        Past Surgical History:   Procedure Laterality Date    COLONOSCOPY         Family History   Problem Relation Age of Onset    Diabetes Mother     Seizures Mother     No Known Problems Father     No Known Problems Family I have reviewed and agree with the history as documented  Social History   Substance Use Topics    Smoking status: Never Smoker    Smokeless tobacco: Never Used    Alcohol use Yes      Comment: occasionally / social         Review of Systems   Constitutional: Positive for fatigue and fever (subjective)  Negative for diaphoresis  HENT: Negative for congestion, rhinorrhea, sore throat and trouble swallowing  Eyes: Negative for pain, discharge and visual disturbance  Respiratory: Negative for cough, chest tightness and shortness of breath  Cardiovascular: Negative for chest pain, palpitations and leg swelling  Gastrointestinal: Negative for abdominal pain, nausea and vomiting  Genitourinary: Negative for decreased urine volume, dysuria, frequency and urgency  Musculoskeletal: Positive for arthralgias, gait problem and joint swelling  Negative for neck pain and neck stiffness  Skin: Negative for color change, rash and wound  Neurological: Negative for dizziness, syncope, light-headedness and headaches  Physical Exam  ED Triage Vitals   Temperature Pulse Respirations Blood Pressure SpO2   05/16/18 0719 05/16/18 0705 05/16/18 0705 05/16/18 0705 05/16/18 0705   97 8 °F (36 6 °C) 78 18 162/89 97 %      Temp Source Heart Rate Source Patient Position - Orthostatic VS BP Location FiO2 (%)   05/16/18 0719 05/16/18 0705 05/16/18 0705 05/16/18 0705 --   Oral Monitor Sitting Left arm       Pain Score       05/16/18 0705       Worst Possible Pain           Orthostatic Vital Signs  Vitals:    05/16/18 0705 05/16/18 1036   BP: 162/89 149/78   Pulse: 78 78   Patient Position - Orthostatic VS: Sitting Lying       Physical Exam   Constitutional: He is oriented to person, place, and time  He appears well-developed and well-nourished  No distress  HENT:   Head: Normocephalic and atraumatic     Mouth/Throat: Oropharynx is clear and moist    Eyes: Conjunctivae and EOM are normal  Right eye exhibits no discharge  Left eye exhibits no discharge  Neck: Normal range of motion  Neck supple  Cardiovascular: Normal rate, regular rhythm and intact distal pulses  Pulmonary/Chest: Effort normal and breath sounds normal  No stridor  No respiratory distress  Abdominal: Soft  Bowel sounds are normal  There is no tenderness  There is no rebound and no guarding  Musculoskeletal: He exhibits edema (joint capsule distended L knee) and tenderness  Normal ROM of L knee but with pain   Neurological: He is alert and oriented to person, place, and time  No sensory deficit  He exhibits normal muscle tone  Skin: Skin is warm and dry  Capillary refill takes less than 2 seconds  Mildly increased warmth of L knee  No erythema   Nursing note and vitals reviewed  ED Medications  Medications   triamcinolone acetonide (KENALOG-40) 40 mg/mL injection 40 mg (40 mg Intra-articular Given by Other 5/16/18 0723)   bupivacaine (PF) (MARCAINE) 0 5 % injection 5 mL (5 mL Injection Given by Other 5/16/18 1594)   lidocaine (PF) (XYLOCAINE-MPF) 1 % injection 5 mL (5 mL Infiltration Given by Other 5/16/18 0757)       Diagnostic Studies  Results Reviewed     Procedure Component Value Units Date/Time    Body fluid culture and Gram stain [[id number]] Resulted:  05/16/18 1542    Lab Status:  Preliminary result Specimen:   Body Fluid Updated:  05/16/18 1542     Gram Stain Result 2+ Polys      No organisms seen    Synovial Fluid Diff [[id number]] Resulted:  05/16/18 1141    Lab Status:  Final result Specimen:  Synovial Fluid Updated:  05/16/18 1141     Total Counted 100     Neutrophil % Synovial 86 %      Monocyte % Synovial 14 %     Synovial fluid, crystal [[id number]] Resulted:  05/16/18 1042    Lab Status:  Final result Specimen:  Synovial Fluid Updated:  05/16/18 1042     Crystals, Synovial Fluid Positive for Monosodium Urate Crystals    Synovial fluid white cell count w/ diff [[id number]] Resulted:  05/16/18 1034    Lab Status:  Final result Specimen:  Synovial Fluid Updated:  05/16/18 1034     Site Synovial      WBC, Fluid 43,865 /ul     Uric acid [[id number]]  (Normal) Collected:  05/16/18 0847    Lab Status:  Final result Specimen:  Blood from Arm, Right Updated:  05/16/18 0909     Uric Acid 7 5 mg/dL                  No orders to display         Procedures  Arthrocentesis  Date/Time: 5/16/2018 8:05 AM  Performed by: Brandie Day by: Mckinley Hall   Consent: Verbal consent obtained  Risks and benefits: risks, benefits and alternatives were discussed  Consent given by: patient  Patient understanding: patient states understanding of the procedure being performed  Patient consent: the patient's understanding of the procedure matches consent given  Procedure consent: procedure consent matches procedure scheduled  Site marked: the operative site was marked  Imaging studies: imaging studies available  Required items: required blood products, implants, devices, and special equipment available  Patient identity confirmed: verbally with patient, arm band and hospital-assigned identification number  Time out: Immediately prior to procedure a "time out" was called to verify the correct patient, procedure, equipment, support staff and site/side marked as required  Indications: joint swelling, diagnostic evaluation, therapeutic and pain   Body area: knee  Joint: left knee  Local anesthesia used: yes  Anesthesia: local infiltration    Anesthesia:  Local anesthesia used: yes  Local Anesthetic: lidocaine 1% without epinephrine  Preparation: Patient was prepped and draped in the usual sterile fashion  Needle size: 18 G  Ultrasound guidance: yes  Approach: inferior (inferolateral)  Aspirate: serous  Aspirate amount: 9 mL  Triamcinolone amount: 40 mg  Bupivacaine 0 50% amount: 3 mL  Comments: Pt became briefly nauseated while aspirating  Did not vomit  No syncope  Felt better after lying down              Phone Consults  ED Phone Contact    ED Course MDM  Number of Diagnoses or Management Options  Gout attack:   Diagnosis management comments: L knee pain with swelling, history of gout  Arthrocentesis done  Crystals, 43K WBCs, gm stain with no organisms  Injected 40mg kenalog, 3 mL bupivacaine with improvement of pain  Uric acid 7 5  Discussed with pt to follow up with PCP, likely increase allopurinol  Rx for ibuprofen, oxycodone  Return precautions discussed  CritCare Time    Disposition  Final diagnoses:   Gout attack     Time reflects when diagnosis was documented in both MDM as applicable and the Disposition within this note     Time User Action Codes Description Comment    5/16/2018 11:04 AM Pat Mcdowell Add [M10 9] Gout attack       ED Disposition     ED Disposition Condition Comment    Discharge  Amandeeptowyenni discharge to home/self care      Condition at discharge: Stable        Follow-up Information     Follow up With Specialties Details Why [address] [facility] [place] Emergency Department Emergency Medicine  If symptoms worsen, As needed [address], [address] [place] [address], [address], MD Family Medicine  follow up gout [address]  [address]  [phone]           Discharge Medication List as of 5/16/2018 11:05 AM      START taking these medications    Details   ibuprofen (MOTRIN) 600 mg tablet Take 1 tablet (600 mg total) by mouth every 6 (six) hours as needed for mild pain or moderate pain, Starting Wed 5/16/2018, Print      oxyCODONE (ROXICODONE) 5 mg immediate release tablet Take 1 tablet (5 mg total) by mouth every 6 (six) hours as needed for severe pain, Starting Wed 5/16/2018, Print         CONTINUE these medications which have NOT CHANGED    Details   acetaminophen (TYLENOL) 500 mg tablet Take 2 tablets by mouth every 6 (six) hours as needed for mild pain, Starting Fri 12/1/2017, Print      allopurinol (ZYLOPRIM) 300 mg tablet TAKE ONE TABLET BY MOUTH TWICE A DAY, Normal      amLODIPine (NORVASC) 2 5 mg tablet Take 2 5 mg by mouth daily  , Historical Med      fenofibrate (TRIGLIDE) 160 MG tablet TAKE ONE TABLET BY MOUTH DAILY, Normal      lisinopril (ZESTRIL) 30 mg tablet Take 1 tablet (30 mg total) by mouth daily, Starting Thu 3/22/2018, Normal      lovastatin (MEVACOR) 40 MG tablet Take 1 tablet by mouth, Starting Wed 2/11/2015, Historical Med      omeprazole (PriLOSEC) 40 MG capsule Take 40 mg by mouth daily  , Historical Med      polyethylene glycol (GOLYTELY) 4000 mL solution Take 4,000 mL by mouth once for 1 dose, Starting Tue 5/8/2018, Normal           No discharge procedures on file  ED Provider  Attending physically available and evaluated [place]  I managed the patient along with the ED Attending      Electronically Signed by         Jillian Freire MD  05/16/18 4954

## 2018-05-16 NOTE — ED ATTENDING ATTESTATION
Itz Langford MD, saw and evaluated the patient  I have discussed the patient with the resident/non-physician practitioner and agree with the resident's/non-physician practitioner's findings, Plan of Care, and MDM as documented in the resident's/non-physician practitioner's note, except where noted  All available labs and Radiology studies were reviewed  At this point I agree with the current assessment done in the Emergency Department    I have conducted an independent evaluation of this patient a history and physical is as follows:    Left knee pain for several days   Has had gout in past   Usually affects elbow    on exam the patient has left knee effusion there is no redness warmth or induration neuro no skin changes   patient states he has had injections in the past with steroids   from a clinical  Standpoint I do not believe this to be an infected joint   will perform arthrocentesis and inject bupivacaine with triamcinolone  Critical Care Time  CritCare Time    Procedures

## 2018-05-16 NOTE — DISCHARGE INSTRUCTIONS
Gota   LO QUE NECESITA SABER:   La gota es un tipo de artritis que causa un intenso dolor y rigidez en las articulaciones  El dolor de gota aguda empieza repentinamente, se empeora rápidamente y cesa por jacobo Sherjake Recio  La gota aguda puede llegar a ser Lobo [place] y causar daño permanente en las articulaciones  INSTRUCCIONES SOBRE EL LIA HOSPITALARIA:   Regrese a la phillip de emergencias si:   · Usted tiene dolor intenso en jaida o más articulaciones que no puede tolerar  · Usted tiene fiebre o enrojecimiento que se propaga más allá del área de la articulación  Pregúntele a jacobo Meeta Chauvin vitaminas y minerales son adecuados para usted  · Usted tiene síntomas nuevos, nu sarpullido, después de comenzar el tratamiento para la gota  · El dolor e inflamación en jacobo articulación no se desaparece, aún después del tratamiento  · Usted no está orinando tanto o con la frecuencia con que suele hacerlo  · Usted tiene problemas para oleksandr mayi medicamentos para la gota  · Usted tiene preguntas o inquietudes acerca de jacobo condición o cuidado  Medicamentos:  Es posible que usted necesite alguno de los siguientes:  · Un medicamento con receta para el dolor  podrían ser Luca Dsouza  Pregunte al médico cómo debe oleksandr lara medicamento de forma zapien  Algunos medicamentos recetados para el dolor contienen acetaminofén  No tome otros medicamentos que contengan acetaminofén sin consultarlo con jacobo médico  Demasiado acetaminofeno puede causar daño al hígado  Los medicamentos recetados para el dolor podrían causar estreñimiento  Pregunte a jacobo médico nu prevenir o tratar estreñimiento  · AINEs (Analgésicos antiinflamatorios no esteroides) nu el ibuprofeno, ayudan a disminuir la inflamación, el dolor y la [place]  [place] medicamento esta disponible con o sin jaida receta médica  Los AINEs pueden causar sangrado estomacal o problemas renales en ciertas personas   Si usted gabby un medicamento anticoagulante, siempre pregúntele a jacobo médico si los Daleen Beards son seguros para usted  Siempre radha la etiqueta de lara medicamento y [place] instrucciones  · El medicamento para la gota  disminuye el dolor e inflamación en las articulaciones  También se puede administrar para prevenir nuevos ataques de gota  · Esteroides  reducen la inflamación y pueden ayudarlo con la rigidez y el dolor en mayi articulaciones nate los ataques de [place]  · El medicamento para ácido úrico  puede administrarse para reducir la cantidad de ácido úrico que jacobo cuerpo produce  Algunos medicamentos pueden ayudar a eliminar más ácido úrico al [place]  · [place] mayi medicamentos nu se le haya indicado  Consulte con jacobo médico si usted william que jacobo medicamento no le está ayudando o si presenta efectos secundarios  Infórmele si es alérgico a cualquier medicamento  Mantenga jaida lista actualizada de los [place], las vitaminas y los productos herbales que gabby  Incluya los siguientes datos de los medicamentos: cantidad, frecuencia y motivo de administración  Traiga con usted la lista o los envases de la píldoras a mayi citas de seguimiento  Lleve la lista de los medicamentos con usted en fela de jaida emergencia  Acuda a mayi consultas de control con jacobo médico según le indicaron  Anote mayi preguntas para que se acuerde de hacerlas nate mayi visitas  Controle la gota:   · Descanse jacobo articulación adolorida para que pueda recuperarse  Jacobo médico podría recomendarle que use muletas o un caminador si la articulación afectada está en jaida pierna  · Aplique hielo a jacobo articulación  El hielo disminuye el dolor y la inflamación  Use un paquete de hielo o ponga hielo molido dentro de [employer]  [place] la bolsa o el paquete de hielo con jaida toalla antes de aplicarla en la articulación adolorida  Aplique hielo nate 15 a 20 minutos por hora o según indicaciones  · Eleve jacobo articulación  [place] le ayudará a disminuir la inflamación y el dolor   Eleve jacobo articulación por encima del nivel del corazón con la frecuencia que pueda  [place] jacobo articulación adolorida sobre almohadas para mantenerla cómodamente por encima del nivel del corazón  · Vaya a fisioterapia según le indicaron  Un fisioterapeuta le puede enseñar ejercicios para mejorar la flexibilidad y el rango de [place]  Prevenga ataques de gota:   · No consuma alimentos altos en purinas  Estos alimentos incluyen:sunitha, mariscos, espárragos, espinacas, coliflor, y algunos tipos de legumbres  Puede que los [employer] indiquen que coma más productos lácteos bajos en grasa, nu el yogur  Los productos lácteos pueden disminuir el riesgo de presentar ataques de [place]  La vitamina C y el café también puedan ayudar  Jacobo médico o un dietista puede ayudarle a crear un plan de comidas  · [employer] líquidos nu se le indique  Los líquidos nu el agua ayudan a eliminar el ácido úrico del cuerpo  Pregunte cuánto líquido debe oleksandr cada día y cuáles líquidos son los más adecuados para usted  · Controle jacobo peso  Bajar de peso puede disminuir la cantidad de ácido úrico en jacobo cuerpo  El ejercicio lo puede ayudar con la pérdida de [place]  Consulte con jacobo médico acerca de los ejercicios adecuados para usted  · Controle mayi niveles de azúcar en la ismael si usted tiene diabetes  Mantenga el nivel de azúcar en jacobo ismael en un margen normal  [place] puede ayudar a prevenir ataques de gota  · Limite o no consuma bebidas alcohólicas, según indicaciones  El alcohol puede provocar un ataque de [place]  El alcohol también aumenta el riesgo de presentar deshidratación  Pregúntele a jacobo médico si usted puede oleksandr alcohol  © 2017 [address] Owens Information is for End User's use only and may not be sold, redistributed or otherwise used for commercial purposes  All illustrations and images included in CareNotes® are the copyrighted property of A KHADAR A M , Inc  or Steve Jc    Esta información es sólo para uso en educación  Jacobo intención no es darle un consejo médico sobre enfermedades o tratamientos  Colsulte con jacobo Dewain Racer farmacéutico antes de seguir cualquier régimen médico para saber si es seguro y efectivo para usted

## 2018-05-19 LAB
BACTERIA SPEC BFLD CULT: NO GROWTH
GRAM STN SPEC: NORMAL
GRAM STN SPEC: NORMAL

## 2018-05-30 DIAGNOSIS — M10.9 GOUT, UNSPECIFIED CAUSE, UNSPECIFIED CHRONICITY, UNSPECIFIED SITE: ICD-10-CM

## 2018-05-30 DIAGNOSIS — E78.5 HYPERLIPIDEMIA, UNSPECIFIED HYPERLIPIDEMIA TYPE: ICD-10-CM

## 2018-05-30 RX ORDER — FENOFIBRATE 160 MG/1
TABLET ORAL
Qty: 30 TABLET | Refills: 1 | Status: SHIPPED | OUTPATIENT
Start: 2018-05-30 | End: 2018-07-25 | Stop reason: SDUPTHER

## 2018-05-30 RX ORDER — LOVASTATIN 40 MG/1
TABLET ORAL
Qty: 90 TABLET | Refills: 1 | Status: SHIPPED | OUTPATIENT
Start: 2018-05-30 | End: 2018-11-15 | Stop reason: SDUPTHER

## 2018-05-30 RX ORDER — ALLOPURINOL 300 MG/1
TABLET ORAL
Qty: 60 TABLET | Refills: 3 | Status: SHIPPED | OUTPATIENT
Start: 2018-05-30 | End: 2018-09-18 | Stop reason: SDUPTHER

## 2018-06-27 ENCOUNTER — ANESTHESIA EVENT (OUTPATIENT)
Dept: PERIOP | Facility: AMBULARY SURGERY CENTER | Age: 53
End: 2018-06-27
Payer: COMMERCIAL

## 2018-06-27 DIAGNOSIS — K25.9 GASTRIC ULCER WITHOUT HEMORRHAGE OR PERFORATION: ICD-10-CM

## 2018-06-27 RX ORDER — OMEPRAZOLE 20 MG/1
CAPSULE, DELAYED RELEASE ORAL
Qty: 60 CAPSULE | Refills: 1 | Status: SHIPPED | OUTPATIENT
Start: 2018-06-27 | End: 2018-08-22 | Stop reason: SDUPTHER

## 2018-06-29 NOTE — PROGRESS NOTES
Assessment/Plan:    No problem-specific Assessment & Plan notes found for this encounter  Diagnoses and all orders for this visit:    Screening for colon cancer  -     Ambulatory referral to Gastroenterology; Future    Gout, unspecified cause, unspecified chronicity, unspecified site  -     indomethacin (INDOCIN) 25 mg capsule; Take 1 capsule (25 mg total) by mouth 3 (three) times a day  -     Ambulatory referral to Nutrition Services; Future    Morbid obesity ([address] )  -     Ambulatory referral to Nutrition Services; Future      long discussion with pt about better compliance with the recommended diet  Will sen dto nutritionist for more education  Add indomethacin for acute pain/ flare ups  But discussed the importance of prevention  Fu 3 months    Subjective:   Pt here for follow up visit  Labs done 3/28/18     Patient ID: Rodríguez Short is a 47 y o  male  HPI   Pt presents for follow up  He cont to continue to complain of gout flare ups  He is taking allopurinol 300mg bid  He is NOT following the recommended Gout diet, i'm unsure he understands it and the importance in other to avoid flare ups  Had an ER eval for left knee pain, had fluid aspirated + for crystals  He now states couple fo days of left ankle pain and swelling  He things maybe " either the hot dog or the pork chop I ate the day before"      The following portions of the patient's history were reviewed and updated as appropriate: allergies, current medications, past family history, past medical history, past social history, past surgical history and problem list     Review of Systems   Constitutional: Negative for activity change and appetite change  Respiratory: Negative  Cardiovascular: Negative  Gastrointestinal: Negative  Genitourinary: Negative  Musculoskeletal: Positive for arthralgias  Skin: Negative for rash  Psychiatric/Behavioral: Negative            Objective:      /80   Pulse 84   Temp 98 4 °F (36 9 °C)   Resp 12   Ht 5' 5 95" (1 675 m)   Wt 125 kg (275 lb)   SpO2 98%   BMI 44 46 kg/m²          Physical Exam   Constitutional: He is oriented to person, place, and time  He appears well-developed and well-nourished  No distress  HENT:   Head: Normocephalic  Eyes: Conjunctivae are normal    Cardiovascular: Normal rate, regular rhythm and normal heart sounds  Pulmonary/Chest: Effort normal and breath sounds normal  No respiratory distress  He has no wheezes  He has no rales  Musculoskeletal: He exhibits edema and tenderness  Neurological: He is alert and oriented to person, place, and time  Psychiatric: He has a normal mood and affect   His behavior is normal

## 2018-07-02 ENCOUNTER — OFFICE VISIT (OUTPATIENT)
Dept: FAMILY MEDICINE CLINIC | Facility: CLINIC | Age: 53
End: 2018-07-02
Payer: COMMERCIAL

## 2018-07-02 VITALS
TEMPERATURE: 98.4 F | OXYGEN SATURATION: 98 % | BODY MASS INDEX: 44.2 KG/M2 | HEIGHT: 66 IN | DIASTOLIC BLOOD PRESSURE: 80 MMHG | WEIGHT: 275 LBS | HEART RATE: 84 BPM | SYSTOLIC BLOOD PRESSURE: 134 MMHG | RESPIRATION RATE: 12 BRPM

## 2018-07-02 DIAGNOSIS — E66.01 MORBID OBESITY (HCC): ICD-10-CM

## 2018-07-02 DIAGNOSIS — Z12.11 SCREENING FOR COLON CANCER: Primary | ICD-10-CM

## 2018-07-02 DIAGNOSIS — M10.9 GOUT, UNSPECIFIED CAUSE, UNSPECIFIED CHRONICITY, UNSPECIFIED SITE: ICD-10-CM

## 2018-07-02 PROCEDURE — 3008F BODY MASS INDEX DOCD: CPT | Performed by: FAMILY MEDICINE

## 2018-07-02 PROCEDURE — 99214 OFFICE O/P EST MOD 30 MIN: CPT | Performed by: FAMILY MEDICINE

## 2018-07-02 RX ORDER — INDOMETHACIN 25 MG/1
25 CAPSULE ORAL 3 TIMES DAILY
Qty: 90 CAPSULE | Refills: 0 | Status: SHIPPED | OUTPATIENT
Start: 2018-07-02 | End: 2019-02-04 | Stop reason: HOSPADM

## 2018-07-02 NOTE — PATIENT INSTRUCTIONS
Dieta baja en purinas   LO QUE NECESITA SABER:   ¿Qué es jaida dieta baja en purinas? Jaiad dieta baja en purinas es un plan alimenticio que se basa en alimentos que tienen un bajo contenido de purinas  Jamaal Comp son sustancias que se encuentran en los alimentos y que el cuerpo produce naturalmente  Jamaal Comp son degradadas por el cuerpo y transformadas en ácido úrico  Normalmente los riñones filtran el ácido úrico y CLACHANDHU sale del cuerpo a través de la orina  Sin embargo, las personas con gota, algunas veces acumulan ácido úrico en la [place]  Amanda acumulamiento de ácido úrico, puede provocar inflamación y dolor (ataque de gota)  Jaida dieta baja en purinas podría ayudarlo a tratar y a evitar los ataques de [place]  ¿Qué alimentos puedo incluir? Los siguientes alimentos son bajos en purinas:  · Huevos, nueces y crema de cacahuate    · Queso y helado bajos en grasa y sin grasa    · Leche sin grasa o del 1%    · Sopa hecha con extracto o caldo de carne    · Verduras que no estén en la lista de purinas medias que figura más abajo    · Todas las frutas y los jugos de fruta    · [place], pasta, arroz, [place], pan de [place] y palomitas de [place]    · [place], soda, te, café y cacao    · Ernesto Lauren y gelatina    · Emely Shillings y aceite  ¿Qué alimentos nayan limitar? · Alimentos medios en purinas:      ¨ Florencio:  Limite lo siguiente de 4 a 6 onzas cada día  Hughie Morita de res y [address] [facility], [place], [facility] y camarón    ¨ Verduras:  Limite las siguientes verduras a ½ taza cada día  ¨ Espárragos    ¨ Colifor    ¨ Espinaca    ¨ Champiñones    ¨ Chícharos verdes    ¨ Frijoles, chícharos y lentejas (limítese a 1 taza cada día)    ¨ Mesfin (Limítese a ? de taza de mesfin cruda cada día)  ¨ Germen de connie y salvado (Limítese a ¼ de taza cada día)    · Alimentos altos en purinas:  Limite o evite los [employer] en purinas      ¨ Anchoas, concha, almejas y mejillones    ¨ Atún, Manuel, arenque y [address]  de Leander Nguyen ender nu ganso y mauri    ¨ [address] de [place], nu sesos, corazón, riñón, hígado y mollejas    ¨ Salsas elaboradas con carne    ¨ Extractos de levadura que se consumen en forma de suplemento  ¿Qué otras pautas nayan seguir? · Aumente la ingesta de líquidos  Jelly de 8 a 16 vasos (ocho onzas) de líquido cada día  Al menos la mitad de los líquidos que ingiera deberían ser agua  Los líquidos pueden ayudar al cuerpo a eliminar el exceso de ácido úrico      · Limite o evite el consumo de alcohol  El alcohol (especialmente la cerveza) aumenta el riesgo de un ataque de gota  Las cervezas contienen jaida cantidad bridgett de purinas  · Mantenga un peso saludable  Si usted tiene sobrepeso, debería perder Sully aXess [employer]  La pérdida de peso puede ayudarlo a disminuir la cantidad de presión en las articulaciones  El ejercicio regular puede ayudarlo a perder peso si tiene sobrepeso o a mantenerlo si tiene un peso normal  Consulte con navarrete médico antes de empezar un régimen de ejercicios  ACUERDOS SOBRE NAVARRETE CUIDADO:   Usted tiene el derecho de ayudar a planear navarrete cuidado  Discuta mayi opciones de tratamiento con mayi médicos para decidir el cuidado que usted desea recibir  Usted siempre tiene el derecho de rechazar el tratamiento  Esta información es sólo para uso en educación  Navarrete intención no es darle un consejo médico sobre enfermedades o tratamientos  Colsulte con navarrete Michel Guess farmacéutico antes de seguir cualquier régimen médico para saber si es seguro y efectivo para usted  © 2017 [address] Nicolas Owens Information is for End User's use only and may not be sold, redistributed or otherwise used for commercial purposes  All illustrations and images included in CareNotes® are the copyrighted property of A D A M , Inc  or Steve Jc

## 2018-07-10 ENCOUNTER — HOSPITAL ENCOUNTER (OUTPATIENT)
Facility: AMBULARY SURGERY CENTER | Age: 53
Setting detail: OUTPATIENT SURGERY
Discharge: HOME | End: 2018-07-10
Attending: INTERNAL MEDICINE | Admitting: INTERNAL MEDICINE
Payer: COMMERCIAL

## 2018-07-10 ENCOUNTER — ANESTHESIA (OUTPATIENT)
Dept: PERIOP | Facility: AMBULARY SURGERY CENTER | Age: 53
End: 2018-07-10
Payer: COMMERCIAL

## 2018-07-10 VITALS
OXYGEN SATURATION: 98 % | TEMPERATURE: 96.9 F | HEIGHT: 68 IN | BODY MASS INDEX: 41.68 KG/M2 | HEART RATE: 76 BPM | RESPIRATION RATE: 20 BRPM | DIASTOLIC BLOOD PRESSURE: 57 MMHG | WEIGHT: 275 LBS | SYSTOLIC BLOOD PRESSURE: 110 MMHG

## 2018-07-10 DIAGNOSIS — K25.9 GASTRIC ULCER WITHOUT HEMORRHAGE OR PERFORATION, UNSPECIFIED CHRONICITY: ICD-10-CM

## 2018-07-10 DIAGNOSIS — D12.5 ADENOMATOUS POLYP OF SIGMOID COLON: ICD-10-CM

## 2018-07-10 DIAGNOSIS — K21.9 GASTROESOPHAGEAL REFLUX DISEASE WITHOUT ESOPHAGITIS: ICD-10-CM

## 2018-07-10 DIAGNOSIS — K44.9 HIATAL HERNIA: ICD-10-CM

## 2018-07-10 DIAGNOSIS — Z12.11 SCREENING FOR COLON CANCER: ICD-10-CM

## 2018-07-10 PROCEDURE — 88305 TISSUE EXAM BY PATHOLOGIST: CPT | Performed by: PATHOLOGY

## 2018-07-10 PROCEDURE — 88313 SPECIAL STAINS GROUP 2: CPT | Performed by: PATHOLOGY

## 2018-07-10 PROCEDURE — 88342 IMHCHEM/IMCYTCHM 1ST ANTB: CPT | Performed by: PATHOLOGY

## 2018-07-10 RX ORDER — IBUPROFEN 800 MG/1
800 TABLET ORAL EVERY 6 HOURS PRN
COMMUNITY
End: 2018-10-03

## 2018-07-10 RX ORDER — PROPOFOL 10 MG/ML
INJECTION, EMULSION INTRAVENOUS AS NEEDED
Status: DISCONTINUED | OUTPATIENT
Start: 2018-07-10 | End: 2018-07-10 | Stop reason: SURG

## 2018-07-10 RX ORDER — SODIUM CHLORIDE 9 MG/ML
INJECTION, SOLUTION INTRAVENOUS CONTINUOUS PRN
Status: DISCONTINUED | OUTPATIENT
Start: 2018-07-10 | End: 2018-07-10 | Stop reason: SURG

## 2018-07-10 RX ORDER — LIDOCAINE HYDROCHLORIDE 10 MG/ML
INJECTION, SOLUTION INFILTRATION; PERINEURAL AS NEEDED
Status: DISCONTINUED | OUTPATIENT
Start: 2018-07-10 | End: 2018-07-10 | Stop reason: SURG

## 2018-07-10 RX ORDER — FENTANYL CITRATE 50 UG/ML
INJECTION, SOLUTION INTRAMUSCULAR; INTRAVENOUS AS NEEDED
Status: DISCONTINUED | OUTPATIENT
Start: 2018-07-10 | End: 2018-07-10 | Stop reason: SURG

## 2018-07-10 RX ADMIN — SODIUM CHLORIDE: 0.9 INJECTION, SOLUTION INTRAVENOUS at 09:02

## 2018-07-10 RX ADMIN — FENTANYL CITRATE 25 MCG: 50 INJECTION, SOLUTION INTRAMUSCULAR; INTRAVENOUS at 09:16

## 2018-07-10 RX ADMIN — FENTANYL CITRATE 25 MCG: 50 INJECTION, SOLUTION INTRAMUSCULAR; INTRAVENOUS at 09:19

## 2018-07-10 RX ADMIN — PROPOFOL 50 MG: 10 INJECTION, EMULSION INTRAVENOUS at 09:20

## 2018-07-10 RX ADMIN — SODIUM CHLORIDE: 0.9 INJECTION, SOLUTION INTRAVENOUS at 08:59

## 2018-07-10 RX ADMIN — FENTANYL CITRATE 50 MCG: 50 INJECTION, SOLUTION INTRAMUSCULAR; INTRAVENOUS at 09:23

## 2018-07-10 RX ADMIN — PROPOFOL 50 MG: 10 INJECTION, EMULSION INTRAVENOUS at 09:13

## 2018-07-10 RX ADMIN — PROPOFOL 50 MG: 10 INJECTION, EMULSION INTRAVENOUS at 09:11

## 2018-07-10 RX ADMIN — PROPOFOL 50 MG: 10 INJECTION, EMULSION INTRAVENOUS at 09:08

## 2018-07-10 RX ADMIN — PROPOFOL 50 MG: 10 INJECTION, EMULSION INTRAVENOUS at 09:16

## 2018-07-10 RX ADMIN — PROPOFOL 90 MG: 10 INJECTION, EMULSION INTRAVENOUS at 09:07

## 2018-07-10 RX ADMIN — LIDOCAINE HYDROCHLORIDE 50 MG: 10 INJECTION, SOLUTION INFILTRATION; PERINEURAL at 09:05

## 2018-07-10 RX ADMIN — PROPOFOL 50 MG: 10 INJECTION, EMULSION INTRAVENOUS at 09:25

## 2018-07-10 RX ADMIN — PROPOFOL 110 MG: 10 INJECTION, EMULSION INTRAVENOUS at 09:05

## 2018-07-10 NOTE — ANESTHESIA PREPROCEDURE EVALUATION
Review of Systems/Medical History    Chart reviewed  No history of anesthetic complications     Cardiovascular  Exercise tolerance (METS): >4,  Hyperlipidemia, Hypertension ,    Pulmonary  Negative pulmonary ROS   Comment: Pt denies FÉLIX     GI/Hepatic    GERD well controlled,  Hiatal hernia,             Endo/Other  Negative endo/other ROS      GYN       Hematology   Musculoskeletal  Gout,   Comment: LBP Arthritis     Neurology   Psychology           Physical Exam    Airway  Comment: obese  Mallampati score: III  TM Distance: >3 FB  Neck ROM: full     Dental   No notable dental hx     Cardiovascular      Pulmonary      Other Findings        Anesthesia Plan  ASA Score- 3     Anesthesia Type- IV sedation with anesthesia with ASA Monitors  Additional Monitors:   Airway Plan:     Comment: GA backup  Plan Factors-    Induction- intravenous  Postoperative Plan-     Informed Consent- Anesthetic plan and risks discussed with patient  I personally reviewed this patient with the CRNA  Discussed and agreed on the Anesthesia Plan with the CRNA  Terrance Manzo

## 2018-07-10 NOTE — PROCEDURES
Colonoscopy Procedure Note    Procedure: Colonoscopy    Sedation: Monitored anesthesia care, check anesthesia records      ASA Class: 2    INDICATIONS: Screening for Colon Cancer    POST-OP DIAGNOSIS: See the impression below    Procedure Details     Prior colonoscopy: No prior colonoscopy  Informed consent was obtained for the procedure, including sedation  Risks of perforation, hemorrhage, adverse drug reaction and aspiration were discussed  The patient was placed in the left lateral decubitus position  Based on the pre-procedure assessment, including review of the patient's medical history, medications, allergies, and review of systems, he had been deemed to be an appropriate candidate for conscious sedation; he was therefore sedated with the medications listed below  The patient was monitored continuously with telemetry, pulse oximetry, blood pressure monitoring, and direct observations  A rectal examination was performed  The colonoscope was inserted into the rectum and advanced under direct vision to the cecum, which was identified by the ileocecal valve and appendiceal orifice  The quality of the colonic preparation was good  A careful inspection was made as the colonoscope was withdrawn, including a retroflexed view of the rectum; findings and interventions are described below  Findings:  5 mm polyp found in the sigmoid colon removed by cold biopsy polypectomy  Tortuous colon  Small hemorrhoids           Complications:  None; patient tolerated the procedure well  Impression:      Repeat colonoscopy in 5 years due to tortuous colon  Small colonic polyp removed  Small internal hemorrhoids  Recommendations:  Repeat colonoscopy in 5 years if polyps are adenomas    And due to tortuous colon

## 2018-07-10 NOTE — H&P
History and Physical -  [facility]  Moriah Adrian Jennings 47 y o  male MRN: [id number]                  HPI: Robyn Newton is a 47y o  year old male who presents for EGD evaluation for reflux and screening colonoscopy    REVIEW OF SYSTEMS: Per the HPI, and otherwise unremarkable  Historical Information   Past Medical History:   Diagnosis Date    Arthritis     GERD (gastroesophageal reflux disease)     Gout     Gout     Hypercholesteremia     Hypertension     Low back pain      Past Surgical History:   Procedure Laterality Date    COLONOSCOPY       Social History   History   Alcohol Use    Yes     Comment: occasionally / social      History   Drug Use No     History   Smoking Status    Never Smoker   Smokeless Tobacco    Never Used     Family History   Problem Relation Age of Onset    Diabetes Mother     Seizures Mother     No Known Problems Father     No Known Problems Family        Meds/Allergies     Prescriptions Prior to Admission   Medication    ibuprofen (MOTRIN) 800 mg tablet    acetaminophen (TYLENOL) 500 mg tablet    allopurinol (ZYLOPRIM) 300 mg tablet    amLODIPine (NORVASC) 2 5 mg tablet    fenofibrate (TRIGLIDE) 160 MG tablet    indomethacin (INDOCIN) 25 mg capsule    lisinopril (ZESTRIL) 30 mg tablet    lovastatin (MEVACOR) 40 MG tablet    omeprazole (PriLOSEC) 20 mg delayed release capsule    polyethylene glycol (GOLYTELY) 4000 mL solution       No Known Allergies    Objective     Blood pressure (!) 196/88, pulse 92, temperature (!) 97 3 °F (36 3 °C), resp  rate 16, height 5' 8" (1 727 m), weight 125 kg (275 lb), SpO2 98 %        PHYSICAL EXAM    Gen: NAD  CV: RRR  CHEST: Clear  ABD: soft, NT/ND  EXT: no edema      ASSESSMENT/PLAN:  This is a 47y o  year old male here for EGD evaluation for longstanding reflux and colonoscopy for screening

## 2018-07-10 NOTE — PROCEDURES
ESOPHAGOGASTRODUODENOSCOPY    PROCEDURE: EGD    SEDATION: Monitored anesthesia care, check anesthesia records    ASA Class: 2    INDICATIONS:  GERD    CONSENT:  Informed consent was obtained for the procedure, including sedation after explaining the risks and benefits of the procedure  Risks including but not limited to bleeding, perforation, infection, and missed lesion  PREPARATION:   Telemetry, pulse oximetry, blood pressure were monitored throughout the procedure  Patient was identified by myself both verbally and by visual inspection of ID band  DESCRIPTION:   Patient was placed in the left lateral decubitus position and was sedated with the above medication  The gastroscope was introduced in to the oropharynx and the esophagus was intubated under direct visualization  Scope was passed down the esophagus up to 2nd part of the duodenum  A careful inspection was made as the gastroscope was withdrawn, including a retroflexed view of the stomach; findings and interventions are described below  FINDINGS:    #1  Esophagus-mild esophagitis which was LA class A esophagitis  #2  Stomach-erosive gastritis in the antrum status post biopsy for H pylori  #3  Duodenum-normal         IMPRESSIONS:      Esophagitis and gastritis    RECOMMENDATIONS:   Avoid NSAID  Follow-up biopsy for HP    COMPLICATIONS:  None; patient tolerated the procedure well            DISPOSITION: PACU           CONDITION: Stable

## 2018-07-25 DIAGNOSIS — E78.5 HYPERLIPIDEMIA, UNSPECIFIED HYPERLIPIDEMIA TYPE: ICD-10-CM

## 2018-07-25 RX ORDER — FENOFIBRATE 160 MG/1
TABLET ORAL
Qty: 30 TABLET | Refills: 3 | Status: SHIPPED | OUTPATIENT
Start: 2018-07-25 | End: 2018-11-15 | Stop reason: SDUPTHER

## 2018-08-22 DIAGNOSIS — K25.9 GASTRIC ULCER WITHOUT HEMORRHAGE OR PERFORATION: ICD-10-CM

## 2018-08-22 RX ORDER — OMEPRAZOLE 20 MG/1
CAPSULE, DELAYED RELEASE ORAL
Qty: 60 CAPSULE | Refills: 1 | Status: SHIPPED | OUTPATIENT
Start: 2018-08-22 | End: 2018-10-18 | Stop reason: SDUPTHER

## 2018-09-18 DIAGNOSIS — I10 BENIGN ESSENTIAL HYPERTENSION: ICD-10-CM

## 2018-09-18 DIAGNOSIS — M10.9 GOUT, UNSPECIFIED CAUSE, UNSPECIFIED CHRONICITY, UNSPECIFIED SITE: ICD-10-CM

## 2018-09-19 RX ORDER — LISINOPRIL 30 MG/1
TABLET ORAL
Qty: 90 TABLET | Refills: 5 | Status: SHIPPED | OUTPATIENT
Start: 2018-09-19 | End: 2019-07-11 | Stop reason: SDUPTHER

## 2018-09-19 RX ORDER — ALLOPURINOL 300 MG/1
TABLET ORAL
Qty: 60 TABLET | Refills: 3 | Status: SHIPPED | OUTPATIENT
Start: 2018-09-19 | End: 2019-07-11 | Stop reason: SDUPTHER

## 2018-09-19 RX ORDER — AMLODIPINE BESYLATE 2.5 MG/1
TABLET ORAL
Qty: 90 TABLET | Refills: 8 | Status: SHIPPED | OUTPATIENT
Start: 2018-09-19 | End: 2019-07-11 | Stop reason: SDUPTHER

## 2018-09-26 ENCOUNTER — HOSPITAL ENCOUNTER (EMERGENCY)
Facility: HOSPITAL | Age: 53
Discharge: HOME | End: 2018-09-27
Attending: EMERGENCY MEDICINE
Payer: COMMERCIAL

## 2018-09-26 DIAGNOSIS — S05.01XA BILATERAL CORNEAL ABRASIONS, INITIAL ENCOUNTER: Primary | ICD-10-CM

## 2018-09-26 DIAGNOSIS — S05.02XA BILATERAL CORNEAL ABRASIONS, INITIAL ENCOUNTER: Primary | ICD-10-CM

## 2018-09-26 PROCEDURE — 99283 EMERGENCY DEPT VISIT LOW MDM: CPT

## 2018-09-26 RX ADMIN — FLUORESCEIN SODIUM AND PROPARACAINE HYDROCHLORIDE 1 DROP: 2.5; 5 SOLUTION/ DROPS OPHTHALMIC at 23:50

## 2018-09-27 VITALS
RESPIRATION RATE: 22 BRPM | HEART RATE: 80 BPM | OXYGEN SATURATION: 98 % | TEMPERATURE: 98 F | DIASTOLIC BLOOD PRESSURE: 87 MMHG | BODY MASS INDEX: 43.24 KG/M2 | WEIGHT: 284.39 LBS | SYSTOLIC BLOOD PRESSURE: 126 MMHG

## 2018-09-27 RX ORDER — HYDROCODONE BITARTRATE AND ACETAMINOPHEN 5; 325 MG/1; MG/1
1 TABLET ORAL ONCE
Status: COMPLETED | OUTPATIENT
Start: 2018-09-27 | End: 2018-09-27

## 2018-09-27 RX ORDER — HYDROCODONE BITARTRATE AND ACETAMINOPHEN 5; 325 MG/1; MG/1
1 TABLET ORAL EVERY 6 HOURS PRN
Qty: 16 TABLET | Refills: 0 | Status: SHIPPED | OUTPATIENT
Start: 2018-09-27 | End: 2018-10-03

## 2018-09-27 RX ORDER — CIPROFLOXACIN HYDROCHLORIDE 3.5 MG/ML
1 SOLUTION/ DROPS TOPICAL 4 TIMES DAILY
Qty: 10 ML | Refills: 0 | Status: SHIPPED | OUTPATIENT
Start: 2018-09-27 | End: 2018-10-03

## 2018-09-27 RX ADMIN — HYDROCODONE BITARTRATE AND ACETAMINOPHEN 1 TABLET: 5; 325 TABLET ORAL at 00:21

## 2018-09-27 NOTE — DISCHARGE INSTRUCTIONS
Abrasión corneal   LO QUE NECESITA SABER:   Jaida abrasión corneal es jaida lesión, nu un rasguño, en la córnea del ruddy  La córnea es la capa transparente que cubre la parte frontal de austin ruddy  Si el rasguño es pequeño, puede sanar en 1 a 2 días  Es posible que si el rasguño es más buster demore más tiempo en sanar  INSTRUCCIONES SOBRE EL LIA HOSPITALARIA:   Pregúntele a austin Arlene Perez vitaminas y minerales son adecuados para usted  · [place] dolor [address]  · Usted tiene secreción amarilla o lizzy saliendo de austin ruddy  · Usted tiene preguntas o inquietudes acerca de austin condición o cuidado  Medicamentos:   · Medicamentos,  podrían administrarse en gotas o ungüento para ayudar a prevenir jaida infección en el ruddy  También es probable que le den gotas para los ojos con el propósito de disminuir el dolor  Pregunte cómo oleksandr estos medicamentos de jaida forma zapien  · [place] mayi medicamentos nu se le haya indicado  Consulte con austin médico si usted william que austin medicamento no le está ayudando o si presenta efectos secundarios  Infórmele si es alérgico a cualquier medicamento  Mantenga jaida lista actualizada de los [place], las vitaminas y los productos herbales que gabby  Incluya los siguientes datos de los medicamentos: cantidad, frecuencia y motivo de administración  Traiga con usted la lista o los envases de la píldoras a mayi citas de seguimiento  Lleve la lista de los medicamentos con usted en fela de jaida emergencia  Acuda a mayi consultas de control con austin médico según le indicaron  Anote mayi preguntas para que se acuerde de hacerlas nate mayi visitas  Cuidados personales:   · No se toque ni frote austin ruddy  · Pregúntele a austin médico cuándo puede volver a mayi [employer]  · Pregúntele a austin médico cuándo puede usar mayi lentes de contacto  · Use anteojos cuando esté expuesto a la sophia brillante y [employer] mayi ojos se sientan mejor    Ayude a [address] corneales:   · Si mayi ojos se sienten secos o irritados, retire los lentes de contacto de mayi ojos  · [employer] las alejandra si necesita tocarse los ojos o el marcin  · Córtele las uñas a austin mary para que no se pueda rasguñar el ruddy  · Use anteojos protectores cuando trabaja con químicos, coppola, polvo o metal      · Use anteojos protectores cuando practica deportes  · No use mayi lentes de contacto por más tiempo del debido  · No  use lentes de contacto de color o lentes con formas  Estos lentes pueden causarle lesiones en mayi ojos y [employer] pérdida de la vista  · No use maquillaje con escarcha  La escarcha se puede meter en los ojos y por debajo de los lentes de [place]  · No duerma con mayi lentes de contacto puestos  © 2017 [address] Nicolas Owens Information is for End User's use only and may not be sold, redistributed or otherwise used for commercial purposes  All illustrations and images included in CareNotes® are the copyrighted property of A D A M , Inc  or Steve Jc  Esta información es sólo para uso en educación  [place] intención no es darle un consejo médico sobre enfermedades o tratamientos  Colsulte con austin Star Carrie farmacéutico antes de seguir cualquier régimen médico para saber si es seguro y efectivo para usted

## 2018-09-27 NOTE — ED PROVIDER NOTES
History  Chief Complaint   Patient presents with    Eye Pain     Son reports "friend was welding and he was watching him and he thinks he burned his eyes from the light around 1 or 2pm  Then his eyes started hurting and his vision is blurry and lights hurt him for the last half hour" (in both eyes)     Eye Complaint, Bilateral  -Patient was with friend, who was welding, while patient was watching  -Occurred at 1700  -20 minutes later, patient was complaining of difficulty seeing, with sensation of foreign body  -Patient has sensation of "Sand in eye"  -Happened previously in [address], many years ago  -Eyes were rinsed with water immediately after the event    -Patient reported "fuzzy vision" for several minutes after the event, but this has since resolved  -Patient continues to have substantial distress, associated with eye pain  -Patient notes that he may have removed the foreign substances, but he is not sure, as he continues to have pain            Prior to Admission Medications   Prescriptions Last Dose Informant Patient Reported?  Taking?   acetaminophen (TYLENOL) 500 mg tablet  Self No No   Sig: Take 2 tablets by mouth every 6 (six) hours as needed for mild pain   allopurinol (ZYLOPRIM) 300 mg tablet   No No   Sig: TAKE ONE TABLET BY MOUTH TWICE A DAY   amLODIPine (NORVASC) 2 5 mg tablet   No No   Sig: TAKE ONE TABLET BY MOUTH DAILY AS DIRECTED   fenofibrate (TRIGLIDE) 160 MG tablet   No No   Sig: TAKE ONE TABLET BY MOUTH DAILY   ibuprofen (MOTRIN) 800 mg tablet   Yes No   Sig: Take 800 mg by mouth every 6 (six) hours as needed for mild pain   indomethacin (INDOCIN) 25 mg capsule   No No   Sig: Take 1 capsule (25 mg total) by mouth 3 (three) times a day   lisinopril (ZESTRIL) 30 mg tablet   No No   Sig: TAKE ONE TABLET BY MOUTH DAILY ( do not TAKE LISINOPRIL/HCTZ 20/25)   lovastatin (MEVACOR) 40 MG tablet  Self No No   Sig: TAKE ONE TABLET BY MOUTH AT BEDTIME DAILY   omeprazole (PriLOSEC) 20 mg delayed release capsule   No No   Sig: TAKE TWO CAPSULES BY MOUTH DAILY      Facility-Administered Medications: None       Past Medical History:   Diagnosis Date    Arthritis     GERD (gastroesophageal reflux disease)     Gout     Gout     Hypercholesteremia     Hypertension     Low back pain        Past Surgical History:   Procedure Laterality Date    COLONOSCOPY      [place] ESOPHAGOGASTRODUODENOSCOPY TRANSORAL DIAGNOSTIC N/A 7/10/2018    Procedure: EGD AND COLONOSCOPY;  Surgeon: Gabriel Vázquez MD;  [facility]; Service: Gastroenterology       Family History   Problem Relation Age of Onset    Diabetes Mother     Seizures Mother     No Known Problems Father     No Known Problems Family      I have reviewed and agree with the history as documented  Social History   Substance Use Topics    Smoking status: Never Smoker    Smokeless tobacco: Never Used    Alcohol use Yes      Comment: occasionally / social         Review of Systems  Review of Systems: The Patient/Parent Denies the following: Negative, Except as noted in HPI  Physical Exam  Physical Exam  General: 47 y o  male patient, who appears their stated age, in moderate distress  Skin: No rashes, masses, or lesions noted  HEENT: Atraumatic & Normocephalic  External ears normal, with no noted abnormalities or deformities  Bilateral canals examined, without noted edema or discomfort  No pain while pulling the tragus  TM well visualized bilaterally, with no noted obstruction, effusion, erythema, or air fluid levels  No noted enlargement of the mastoid processes bilaterally  Nares patent bilaterally, with no noted obstructions, erythema, or drainage  No noted rhinorrhea  Pharynx well visualized, with no exudate noted in the posterior pharynx  Tonsils are not enlarged  Eyes: EOMI, PERRL, bilateral conjunctival injection noted, with increased tear production  Eversion of the eyelids, bilaterally, reveals no noted foreign bodies   Fundoscopic examination of the patient utilizing a panoptic reveals no cotton wool spots, krishnamurthy spots, or apparent flame hemorrhages  Fluorecin examination revealed bilateral small corneal abrasions  Neck: Soft, supple, and non-tender  No enlargement of the anterior cervical, posterior cervical, or occipital lymph notes  Cardiac: Regular rate and rhythm, with no noted murmurs, rubs, or gallops  Pulmonary: Normal Appearance  Clear to auscultation, with no noted rales, rhonchi, or wheezes  Abdomen: Normal appearance  Dull to palpation, except over the gastric bubble, which was mildly tympanic  Bowel sounds were within normal limits, with no noted high pitch sounds heard  Vital Signs  ED Triage Vitals   Temperature Pulse Respirations Blood Pressure SpO2   09/26/18 2312 09/26/18 2312 09/26/18 2312 09/26/18 2312 09/26/18 2312   98 °F (36 7 °C) 77 20 138/76 97 %      Temp Source Heart Rate Source Patient Position - Orthostatic VS BP Location FiO2 (%)   09/26/18 2312 09/26/18 2312 09/26/18 2312 09/26/18 2312 --   Oral Monitor Lying Right arm       Pain Score       09/27/18 0021       5           Vitals:    09/26/18 2312 09/27/18 0000 09/27/18 0025   BP: 138/76 130/80 126/87   Pulse: 77 76 80   Patient Position - Orthostatic VS: Lying Sitting Sitting       Visual Acuity      ED Medications  Medications   Fluorescein-Proparacaine (FLUCAINE) 0 25-0 5 % ophthalmic solution 1 drop (1 drop Both Eyes Given 9/26/18 2350)   HYDROcodone-acetaminophen (NORCO) 5-325 mg per tablet 1 tablet (1 tablet Oral Given 9/27/18 0021)       Diagnostic Studies  Results Reviewed     None                 No orders to display              Procedures  Procedures       Phone Contacts  ED Phone Contact    ED Course                               MDM  Number of Diagnoses or Management Options  Bilateral corneal abrasions, initial encounter: new and requires workup  Diagnosis management comments: Most likely diagnosis is bilateral corneal abrasions  Primary considerations and diagnosis include the notable history of contact with lying foreign bodies, consisting metal, without utilizing are where  In addition, patient has normal funduscopic examination, normal current visual field and visual acuity, as well as the positive corneal abrasions with fluorescein stain all indicate corneal abrasion  In addition, the patient's symptoms were nearly completely resolve when the patient was being prepared for the exam, with utilization of proparacaine  As such, the patient will be discharged with ciprofloxacin drops, with no steroid component, and the patient will be provided prescription for opiate pain medication  In addition, the patient was instructed follow up with Ophthalmology, as this condition requires continued evaluation and management  The patient was in agreement with this plan  The patient was instructed to return to the emergency department if he has character or nature of his symptoms greatly changed, if the patient develops decreased visual field or visual acuity, if the pain greatly increases, or the character nature of the patient's initial presenting symptoms greatly changed  At the time of discharge, the patient's visual fields within normal limits, the patient's visual acuity within the normal limits, and the patient was hemodynamically stable, in no distress         Amount and/or Complexity of Data Reviewed  Tests in the medicine section of CPT®: reviewed and ordered  Decide to obtain previous medical records or to obtain history from someone other than the patient: yes  Obtain history from someone other than the patient: yes  Review and summarize past medical records: yes  Discuss the patient with other providers: yes  Independent visualization of images, tracings, or specimens: yes    Risk of Complications, Morbidity, and/or Mortality  Presenting problems: moderate  Diagnostic procedures: high  Management options: moderate    Patient Progress  Patient progress: stable    CritCare Time    Disposition  Final diagnoses:   Bilateral corneal abrasions, initial encounter     Time reflects when diagnosis was documented in both MDM as applicable and the Disposition within this note     Time User Action Codes Description Comment    9/27/2018 12:07 AM Cy Vilchis Add [S05  01XA,  S05 02XA] Bilateral corneal abrasions, initial encounter       ED Disposition     ED Disposition Condition Comment    Discharge  Emilywyenni discharge to home/self care      Condition at discharge: Good        Follow-up Information     Follow up With Specialties Details Why [address]  [facility] Ophthalmology In 3 days  [address] [facility]  [phone]            Discharge Medication List as of 9/27/2018 12:14 AM      START taking these medications    Details   ciprofloxacin (CILOXAN) 0 3 % ophthalmic solution Administer 1 drop to both eyes 4 (four) times a day for 5 days, Starting Thu 9/27/2018, Until Tue 10/2/2018, Print      HYDROcodone-acetaminophen (NORCO) 5-325 mg per tablet Take 1 tablet by mouth every 6 (six) hours as needed for pain for up to 10 days Max Daily Amount: 4 tablets, Starting u 9/27/2018, Until Sun 10/7/2018, Print         CONTINUE these medications which have NOT CHANGED    Details   acetaminophen (TYLENOL) 500 mg tablet Take 2 tablets by mouth every 6 (six) hours as needed for mild pain, Starting Fri 12/1/2017, Print      allopurinol (ZYLOPRIM) 300 mg tablet TAKE ONE TABLET BY MOUTH TWICE A DAY, Normal      amLODIPine (NORVASC) 2 5 mg tablet TAKE ONE TABLET BY MOUTH DAILY AS DIRECTED, Normal      fenofibrate (TRIGLIDE) 160 MG tablet TAKE ONE TABLET BY MOUTH DAILY, Normal      ibuprofen (MOTRIN) 800 mg tablet Take 800 mg by mouth every 6 (six) hours as needed for mild pain, Historical Med      indomethacin (INDOCIN) 25 mg capsule Take 1 capsule (25 mg total) by mouth 3 (three) times a day, Starting Mon 7/2/2018, Normal lisinopril (ZESTRIL) 30 mg tablet TAKE ONE TABLET BY MOUTH DAILY ( do not TAKE LISINOPRIL/HCTZ 20/25), Normal      lovastatin (MEVACOR) 40 MG tablet TAKE ONE TABLET BY MOUTH AT BEDTIME DAILY, Normal      omeprazole (PriLOSEC) 20 mg delayed release capsule TAKE TWO CAPSULES BY MOUTH DAILY, Normal           No discharge procedures on file      ED Provider  Electronically Signed by           Chhaya Robb PA-C  09/28/18 0570

## 2018-10-03 ENCOUNTER — TELEPHONE (OUTPATIENT)
Dept: BARIATRICS | Facility: CLINIC | Age: 53
End: 2018-10-03

## 2018-10-03 ENCOUNTER — OFFICE VISIT (OUTPATIENT)
Dept: FAMILY MEDICINE CLINIC | Facility: CLINIC | Age: 53
End: 2018-10-03
Payer: COMMERCIAL

## 2018-10-03 VITALS
HEART RATE: 98 BPM | RESPIRATION RATE: 20 BRPM | SYSTOLIC BLOOD PRESSURE: 140 MMHG | BODY MASS INDEX: 41.83 KG/M2 | TEMPERATURE: 98 F | OXYGEN SATURATION: 97 % | DIASTOLIC BLOOD PRESSURE: 80 MMHG | WEIGHT: 276 LBS | HEIGHT: 68 IN

## 2018-10-03 DIAGNOSIS — I10 BENIGN ESSENTIAL HYPERTENSION: ICD-10-CM

## 2018-10-03 DIAGNOSIS — E66.01 MORBID OBESITY (HCC): ICD-10-CM

## 2018-10-03 DIAGNOSIS — E78.5 DYSLIPIDEMIA: ICD-10-CM

## 2018-10-03 DIAGNOSIS — R73.01 IMPAIRED FASTING GLUCOSE: Primary | ICD-10-CM

## 2018-10-03 DIAGNOSIS — K21.9 GASTROESOPHAGEAL REFLUX DISEASE WITHOUT ESOPHAGITIS: ICD-10-CM

## 2018-10-03 DIAGNOSIS — M10.9 GOUT, UNSPECIFIED CAUSE, UNSPECIFIED CHRONICITY, UNSPECIFIED SITE: ICD-10-CM

## 2018-10-03 PROBLEM — J06.9 VIRAL UPPER RESPIRATORY TRACT INFECTION: Status: RESOLVED | Noted: 2018-03-22 | Resolved: 2018-10-03

## 2018-10-03 PROCEDURE — 1036F TOBACCO NON-USER: CPT | Performed by: FAMILY MEDICINE

## 2018-10-03 PROCEDURE — 99214 OFFICE O/P EST MOD 30 MIN: CPT | Performed by: FAMILY MEDICINE

## 2018-10-03 NOTE — PROGRESS NOTES
Assessment/Plan:    No problem-specific Assessment & Plan notes found for this encounter  Diagnoses and all orders for this visit:    Impaired fasting glucose  -     Hemoglobin A1C; Future  -     Lipid Panel with Direct LDL reflex; Future  -     Comprehensive metabolic panel; Future  -     CBC and differential; Future  -     Uric acid; Future    Benign essential hypertension  -     Ambulatory referral to Weight Management; Future  -     Hemoglobin A1C; Future  -     Lipid Panel with Direct LDL reflex; Future  -     Comprehensive metabolic panel; Future  -     CBC and differential; Future  -     Uric acid; Future    Dyslipidemia  -     Ambulatory referral to Weight Management; Future  -     Hemoglobin A1C; Future  -     Lipid Panel with Direct LDL reflex; Future  -     Comprehensive metabolic panel; Future  -     CBC and differential; Future  -     Uric acid; Future    Morbid obesity ([facility] [address] )  -     Ambulatory referral to Weight Management; Future  -     Hemoglobin A1C; Future  -     Lipid Panel with Direct LDL reflex; Future  -     Comprehensive metabolic panel; Future  -     CBC and differential; Future  -     Uric acid; Future    Gastroesophageal reflux disease without esophagitis  -     Hemoglobin A1C; Future  -     Lipid Panel with Direct LDL reflex; Future  -     Comprehensive metabolic panel; Future  -     CBC and differential; Future  -     Uric acid; Future    Gout, unspecified cause, unspecified chronicity, unspecified site  -     Hemoglobin A1C; Future  -     Lipid Panel with Direct LDL reflex; Future  -     Comprehensive metabolic panel; Future  -     CBC and differential; Future  -     Uric acid; Future        I will not start patient on opioids for his chronic gout pain and arthralgias  I discussed in detail with him avoiding NSAIDs particularly with his history of gastritis and gastric ulcers  Continue with the allopurinol and can take Tylenol if needed for pain     I had a long conversation with patient about the importance of following the gout diet for prevention of flare-ups  Also discussed in detail weight loss  follow-up in 4 months    Subjective:   Chief Complaint   Patient presents with    Follow-up     3 month    No labs ordered       Patient ID: Tommie Posada is a 47 y o  male  HPI   Patient presents for chronic condition follow-up  Patient is interested in weight loss surgery, he has not made an appointment with [address] [place] [address] weight management Center as he states has tried calling but he does not understand [place]        He continues to have multiple complains of multiple joint pain including knees and elbow  He continues to have gout flare ups despite compliance with the allopurinol, he states in the medicine did not help, ibuprofen did help but he has history of gastric ulcers  Tylenol does not help  He does not follow a gout diet  He continues to eat high fat, red meat, and alcohol  He does say he wants something stronger for his pain  The following portions of the patient's history were reviewed and updated as appropriate: allergies, current medications, past family history, past medical history, past social history, past surgical history and problem list     Review of Systems   Constitutional: Negative for activity change and appetite change  Respiratory: Negative  Cardiovascular: Negative  Gastrointestinal: Negative  Genitourinary: Negative  Musculoskeletal: Positive for arthralgias  Skin: Negative for rash  Psychiatric/Behavioral: Negative  Objective:      /80 (BP Location: Left arm, Patient Position: Sitting, Cuff Size: Adult)   Pulse 98   Temp 98 °F (36 7 °C) (Oral)   Resp 20   Ht 5' 8" (1 727 m)   Wt 125 kg (276 lb)   SpO2 97%   BMI 41 97 kg/m²          Physical Exam   Constitutional: He is oriented to person, place, and time  He appears well-developed and well-nourished  HENT:   Head: Normocephalic     Eyes: Conjunctivae are normal    Cardiovascular: Normal rate, regular rhythm and normal heart sounds  Pulmonary/Chest: Effort normal and breath sounds normal  No respiratory distress  He has no wheezes  He has no rales  Neurological: He is alert and oriented to person, place, and time  Psychiatric: He has a normal mood and affect   His behavior is normal

## 2018-10-04 ENCOUNTER — TELEPHONE (OUTPATIENT)
Dept: BARIATRICS | Facility: CLINIC | Age: 53
End: 2018-10-04

## 2018-10-04 NOTE — TELEPHONE ENCOUNTER
----- Message from Morenita Piña MD sent at 10/3/2018 11:53 AM EDT -----  Regarding: Needs apt, [place] speaking  Hi   Im hoping somebody can help me with this pt  Pt is interested in apt for surgical management of his obesity  Can somebody help him get an apt, he is [place] speaking so language barrier when he called  He would like Dr Angelica Manzano  I will put the referral order in Epic as well       thanks

## 2018-10-16 ENCOUNTER — APPOINTMENT (OUTPATIENT)
Dept: LAB | Facility: HOSPITAL | Age: 53
End: 2018-10-16
Payer: COMMERCIAL

## 2018-10-16 DIAGNOSIS — M10.9 GOUT, UNSPECIFIED CAUSE, UNSPECIFIED CHRONICITY, UNSPECIFIED SITE: ICD-10-CM

## 2018-10-16 DIAGNOSIS — K21.9 GASTROESOPHAGEAL REFLUX DISEASE WITHOUT ESOPHAGITIS: ICD-10-CM

## 2018-10-16 DIAGNOSIS — E78.5 DYSLIPIDEMIA: ICD-10-CM

## 2018-10-16 DIAGNOSIS — E66.01 MORBID OBESITY (HCC): ICD-10-CM

## 2018-10-16 DIAGNOSIS — I10 BENIGN ESSENTIAL HYPERTENSION: ICD-10-CM

## 2018-10-16 DIAGNOSIS — R73.01 IMPAIRED FASTING GLUCOSE: ICD-10-CM

## 2018-10-16 LAB
ALBUMIN SERPL BCP-MCNC: 3.7 G/DL (ref 3.5–5)
ALP SERPL-CCNC: 112 U/L (ref 46–116)
ALT SERPL W P-5'-P-CCNC: 66 U/L (ref 12–78)
ANION GAP SERPL CALCULATED.3IONS-SCNC: 7 MMOL/L (ref 4–13)
AST SERPL W P-5'-P-CCNC: 30 U/L (ref 5–45)
BASOPHILS # BLD AUTO: 0.02 THOUSANDS/ΜL (ref 0–0.1)
BASOPHILS NFR BLD AUTO: 0 % (ref 0–1)
BILIRUB SERPL-MCNC: 0.49 MG/DL (ref 0.2–1)
BUN SERPL-MCNC: 15 MG/DL (ref 5–25)
CALCIUM SERPL-MCNC: 9.2 MG/DL (ref 8.3–10.1)
CHLORIDE SERPL-SCNC: 101 MMOL/L (ref 100–108)
CHOLEST SERPL-MCNC: 211 MG/DL (ref 50–200)
CO2 SERPL-SCNC: 27 MMOL/L (ref 21–32)
CREAT SERPL-MCNC: 0.94 MG/DL (ref 0.6–1.3)
EOSINOPHIL # BLD AUTO: 0.15 THOUSAND/ΜL (ref 0–0.61)
EOSINOPHIL NFR BLD AUTO: 2 % (ref 0–6)
ERYTHROCYTE [DISTWIDTH] IN BLOOD BY AUTOMATED COUNT: 13.2 % (ref 11.6–15.1)
EST. AVERAGE GLUCOSE BLD GHB EST-MCNC: 123 MG/DL
GFR SERPL CREATININE-BSD FRML MDRD: 93 ML/MIN/1.73SQ M
GLUCOSE P FAST SERPL-MCNC: 105 MG/DL (ref 65–99)
HBA1C MFR BLD: 5.9 % (ref 4.2–6.3)
HCT VFR BLD AUTO: 49.3 % (ref 36.5–49.3)
HDLC SERPL-MCNC: 32 MG/DL (ref 40–60)
HGB BLD-MCNC: 16.4 G/DL (ref 12–17)
IMM GRANULOCYTES # BLD AUTO: 0.03 THOUSAND/UL (ref 0–0.2)
IMM GRANULOCYTES NFR BLD AUTO: 0 % (ref 0–2)
LDLC SERPL CALC-MCNC: 116 MG/DL (ref 0–100)
LYMPHOCYTES # BLD AUTO: 2.23 THOUSANDS/ΜL (ref 0.6–4.47)
LYMPHOCYTES NFR BLD AUTO: 29 % (ref 14–44)
MCH RBC QN AUTO: 30.5 PG (ref 26.8–34.3)
MCHC RBC AUTO-ENTMCNC: 33.3 G/DL (ref 31.4–37.4)
MCV RBC AUTO: 92 FL (ref 82–98)
MONOCYTES # BLD AUTO: 0.86 THOUSAND/ΜL (ref 0.17–1.22)
MONOCYTES NFR BLD AUTO: 11 % (ref 4–12)
NEUTROPHILS # BLD AUTO: 4.5 THOUSANDS/ΜL (ref 1.85–7.62)
NEUTS SEG NFR BLD AUTO: 58 % (ref 43–75)
NRBC BLD AUTO-RTO: 0 /100 WBCS
PLATELET # BLD AUTO: 247 THOUSANDS/UL (ref 149–390)
PMV BLD AUTO: 10.2 FL (ref 8.9–12.7)
POTASSIUM SERPL-SCNC: 3.8 MMOL/L (ref 3.5–5.3)
PROT SERPL-MCNC: 7.8 G/DL (ref 6.4–8.2)
RBC # BLD AUTO: 5.38 MILLION/UL (ref 3.88–5.62)
SODIUM SERPL-SCNC: 135 MMOL/L (ref 136–145)
TRIGL SERPL-MCNC: 316 MG/DL
URATE SERPL-MCNC: 7.7 MG/DL (ref 4.2–8)
WBC # BLD AUTO: 7.79 THOUSAND/UL (ref 4.31–10.16)

## 2018-10-16 PROCEDURE — 80061 LIPID PANEL: CPT

## 2018-10-16 PROCEDURE — 36415 COLL VENOUS BLD VENIPUNCTURE: CPT

## 2018-10-16 PROCEDURE — 85025 COMPLETE CBC W/AUTO DIFF WBC: CPT

## 2018-10-16 PROCEDURE — 84550 ASSAY OF BLOOD/URIC ACID: CPT

## 2018-10-16 PROCEDURE — 80053 COMPREHEN METABOLIC PANEL: CPT

## 2018-10-16 PROCEDURE — 83036 HEMOGLOBIN GLYCOSYLATED A1C: CPT

## 2018-10-18 DIAGNOSIS — K25.9 GASTRIC ULCER WITHOUT HEMORRHAGE OR PERFORATION: ICD-10-CM

## 2018-10-18 RX ORDER — OMEPRAZOLE 20 MG/1
CAPSULE, DELAYED RELEASE ORAL
Qty: 60 CAPSULE | Refills: 1 | Status: SHIPPED | OUTPATIENT
Start: 2018-10-18 | End: 2018-12-16 | Stop reason: SDUPTHER

## 2018-11-15 DIAGNOSIS — E78.5 HYPERLIPIDEMIA, UNSPECIFIED HYPERLIPIDEMIA TYPE: ICD-10-CM

## 2018-11-15 RX ORDER — LOVASTATIN 40 MG/1
TABLET ORAL
Qty: 90 TABLET | Refills: 5 | Status: SHIPPED | OUTPATIENT
Start: 2018-11-15 | End: 2020-01-20

## 2018-11-15 RX ORDER — FENOFIBRATE 160 MG/1
TABLET ORAL
Qty: 30 TABLET | Refills: 3 | Status: SHIPPED | OUTPATIENT
Start: 2018-11-15

## 2018-11-20 ENCOUNTER — APPOINTMENT (OUTPATIENT)
Dept: LAB | Facility: HOSPITAL | Age: 53
End: 2018-11-20
Payer: COMMERCIAL

## 2018-11-20 ENCOUNTER — TRANSCRIBE ORDERS (OUTPATIENT)
Dept: LAB | Facility: HOSPITAL | Age: 53
End: 2018-11-20

## 2018-11-20 ENCOUNTER — TRANSCRIBE ORDERS (OUTPATIENT)
Dept: RADIOLOGY | Facility: HOSPITAL | Age: 53
End: 2018-11-20

## 2018-11-20 ENCOUNTER — HOSPITAL ENCOUNTER (OUTPATIENT)
Dept: RADIOLOGY | Facility: HOSPITAL | Age: 53
Discharge: HOME | End: 2018-11-20
Payer: COMMERCIAL

## 2018-11-20 DIAGNOSIS — Z01.818 PREOP EXAMINATION: ICD-10-CM

## 2018-11-20 DIAGNOSIS — E66.01 MORBID OBESITY (HCC): Primary | ICD-10-CM

## 2018-11-20 DIAGNOSIS — E66.01 MORBID OBESITY (HCC): ICD-10-CM

## 2018-11-20 DIAGNOSIS — Z01.812 PRE-OPERATIVE LABORATORY EXAMINATION: ICD-10-CM

## 2018-11-20 DIAGNOSIS — R53.83 FATIGUE, UNSPECIFIED TYPE: ICD-10-CM

## 2018-11-20 LAB
ALBUMIN SERPL BCP-MCNC: 3.8 G/DL (ref 3.5–5)
ALP SERPL-CCNC: 89 U/L (ref 46–116)
ALT SERPL W P-5'-P-CCNC: 59 U/L (ref 12–78)
ANION GAP SERPL CALCULATED.3IONS-SCNC: 9 MMOL/L (ref 4–13)
APTT PPP: 30 SECONDS (ref 26–38)
AST SERPL W P-5'-P-CCNC: 31 U/L (ref 5–45)
ATRIAL RATE: 68 BPM
BILIRUB DIRECT SERPL-MCNC: 0.12 MG/DL (ref 0–0.2)
BILIRUB SERPL-MCNC: 0.52 MG/DL (ref 0.2–1)
BUN SERPL-MCNC: 15 MG/DL (ref 5–25)
CALCIUM SERPL-MCNC: 8.9 MG/DL (ref 8.3–10.1)
CHLORIDE SERPL-SCNC: 104 MMOL/L (ref 100–108)
CHOLEST SERPL-MCNC: 182 MG/DL (ref 50–200)
CO2 SERPL-SCNC: 26 MMOL/L (ref 21–32)
CREAT SERPL-MCNC: 0.91 MG/DL (ref 0.6–1.3)
ERYTHROCYTE [DISTWIDTH] IN BLOOD BY AUTOMATED COUNT: 13.1 % (ref 11.6–15.1)
GFR SERPL CREATININE-BSD FRML MDRD: 97 ML/MIN/1.73SQ M
GLUCOSE P FAST SERPL-MCNC: 91 MG/DL (ref 65–99)
HCT VFR BLD AUTO: 49.5 % (ref 36.5–49.3)
HDLC SERPL-MCNC: 35 MG/DL (ref 40–60)
HGB BLD-MCNC: 16.6 G/DL (ref 12–17)
INR PPP: 1.01 (ref 0.86–1.17)
LDLC SERPL CALC-MCNC: 94 MG/DL (ref 0–100)
MCH RBC QN AUTO: 31.2 PG (ref 26.8–34.3)
MCHC RBC AUTO-ENTMCNC: 33.5 G/DL (ref 31.4–37.4)
MCV RBC AUTO: 93 FL (ref 82–98)
NONHDLC SERPL-MCNC: 147 MG/DL
P AXIS: 47 DEGREES
PLATELET # BLD AUTO: 221 THOUSANDS/UL (ref 149–390)
PMV BLD AUTO: 10.7 FL (ref 8.9–12.7)
POTASSIUM SERPL-SCNC: 3.8 MMOL/L (ref 3.5–5.3)
PR INTERVAL: 162 MS
PROT SERPL-MCNC: 7.8 G/DL (ref 6.4–8.2)
PROTHROMBIN TIME: 13.4 SECONDS (ref 11.8–14.2)
QRS AXIS: 50 DEGREES
QRSD INTERVAL: 90 MS
QT INTERVAL: 408 MS
QTC INTERVAL: 433 MS
RBC # BLD AUTO: 5.32 MILLION/UL (ref 3.88–5.62)
SODIUM SERPL-SCNC: 139 MMOL/L (ref 136–145)
T WAVE AXIS: 70 DEGREES
T3 SERPL-MCNC: 1.3 NG/ML (ref 0.6–1.8)
T4 SERPL-MCNC: 7 UG/DL (ref 4.7–13.3)
TRIGL SERPL-MCNC: 267 MG/DL
TSH SERPL DL<=0.05 MIU/L-ACNC: 3.52 UIU/ML (ref 0.36–3.74)
VENTRICULAR RATE: 68 BPM
WBC # BLD AUTO: 7.41 THOUSAND/UL (ref 4.31–10.16)

## 2018-11-20 PROCEDURE — 93010 ELECTROCARDIOGRAM REPORT: CPT | Performed by: INTERNAL MEDICINE

## 2018-11-20 PROCEDURE — 85027 COMPLETE CBC AUTOMATED: CPT

## 2018-11-20 PROCEDURE — 36415 COLL VENOUS BLD VENIPUNCTURE: CPT

## 2018-11-20 PROCEDURE — 93005 ELECTROCARDIOGRAM TRACING: CPT

## 2018-11-20 PROCEDURE — 84436 ASSAY OF TOTAL THYROXINE: CPT

## 2018-11-20 PROCEDURE — 80061 LIPID PANEL: CPT

## 2018-11-20 PROCEDURE — 84443 ASSAY THYROID STIM HORMONE: CPT

## 2018-11-20 PROCEDURE — 71046 X-RAY EXAM CHEST 2 VIEWS: CPT

## 2018-11-20 PROCEDURE — 85610 PROTHROMBIN TIME: CPT

## 2018-11-20 PROCEDURE — 84480 ASSAY TRIIODOTHYRONINE (T3): CPT

## 2018-11-20 PROCEDURE — 80076 HEPATIC FUNCTION PANEL: CPT

## 2018-11-20 PROCEDURE — 85730 THROMBOPLASTIN TIME PARTIAL: CPT

## 2018-11-20 PROCEDURE — 80048 BASIC METABOLIC PNL TOTAL CA: CPT

## 2018-12-16 DIAGNOSIS — K25.9 GASTRIC ULCER WITHOUT HEMORRHAGE OR PERFORATION: ICD-10-CM

## 2018-12-17 RX ORDER — OMEPRAZOLE 20 MG/1
CAPSULE, DELAYED RELEASE ORAL
Qty: 60 CAPSULE | Refills: 1 | Status: SHIPPED | OUTPATIENT
Start: 2018-12-17

## 2019-01-15 ENCOUNTER — TELEPHONE (OUTPATIENT)
Dept: FAMILY MEDICINE CLINIC | Facility: CLINIC | Age: 54
End: 2019-01-15

## 2019-02-03 ENCOUNTER — APPOINTMENT (EMERGENCY)
Dept: RADIOLOGY | Facility: HOSPITAL | Age: 54
End: 2019-02-03
Payer: COMMERCIAL

## 2019-02-03 ENCOUNTER — HOSPITAL ENCOUNTER (EMERGENCY)
Facility: HOSPITAL | Age: 54
Discharge: HOME | End: 2019-02-03
Attending: EMERGENCY MEDICINE | Admitting: EMERGENCY MEDICINE
Payer: COMMERCIAL

## 2019-02-03 ENCOUNTER — HOSPITAL ENCOUNTER (OUTPATIENT)
Facility: HOSPITAL | Age: 54
Setting detail: OBSERVATION
Discharge: HOME | End: 2019-02-04
Attending: EMERGENCY MEDICINE | Admitting: HOSPITALIST
Payer: COMMERCIAL

## 2019-02-03 VITALS
RESPIRATION RATE: 18 BRPM | SYSTOLIC BLOOD PRESSURE: 122 MMHG | WEIGHT: 275.57 LBS | BODY MASS INDEX: 41.77 KG/M2 | OXYGEN SATURATION: 97 % | TEMPERATURE: 97.1 F | DIASTOLIC BLOOD PRESSURE: 81 MMHG | HEART RATE: 103 BPM | HEIGHT: 68 IN

## 2019-02-03 DIAGNOSIS — M10.9 GOUT, UNSPECIFIED CAUSE, UNSPECIFIED CHRONICITY, UNSPECIFIED SITE: ICD-10-CM

## 2019-02-03 DIAGNOSIS — M10.9 GOUT ATTACK: Primary | ICD-10-CM

## 2019-02-03 DIAGNOSIS — R26.2 AMBULATORY DYSFUNCTION: ICD-10-CM

## 2019-02-03 DIAGNOSIS — M10.9 GENERALIZED GOUTY ARTHRITIS: Primary | ICD-10-CM

## 2019-02-03 PROBLEM — M25.50 POLYARTHRALGIA: Status: ACTIVE | Noted: 2019-02-03

## 2019-02-03 PROBLEM — E80.6 HYPERBILIRUBINEMIA: Status: ACTIVE | Noted: 2019-02-03

## 2019-02-03 LAB
ALBUMIN SERPL BCP-MCNC: 3.3 G/DL (ref 3.5–5)
ALP SERPL-CCNC: 83 U/L (ref 46–116)
ALT SERPL W P-5'-P-CCNC: 66 U/L (ref 12–78)
ANION GAP SERPL CALCULATED.3IONS-SCNC: 13 MMOL/L (ref 4–13)
AST SERPL W P-5'-P-CCNC: 26 U/L (ref 5–45)
BASOPHILS # BLD AUTO: 0.01 THOUSANDS/ΜL (ref 0–0.1)
BASOPHILS NFR BLD AUTO: 0 % (ref 0–1)
BILIRUB SERPL-MCNC: 1.4 MG/DL (ref 0.2–1)
BUN SERPL-MCNC: 19 MG/DL (ref 5–25)
CALCIUM SERPL-MCNC: 9.5 MG/DL (ref 8.3–10.1)
CHLORIDE SERPL-SCNC: 95 MMOL/L (ref 100–108)
CO2 SERPL-SCNC: 27 MMOL/L (ref 21–32)
CREAT SERPL-MCNC: 1.2 MG/DL (ref 0.6–1.3)
EOSINOPHIL # BLD AUTO: 0.03 THOUSAND/ΜL (ref 0–0.61)
EOSINOPHIL NFR BLD AUTO: 0 % (ref 0–6)
ERYTHROCYTE [DISTWIDTH] IN BLOOD BY AUTOMATED COUNT: 12.2 % (ref 11.6–15.1)
GFR SERPL CREATININE-BSD FRML MDRD: 69 ML/MIN/1.73SQ M
GLUCOSE SERPL-MCNC: 104 MG/DL (ref 65–140)
HCT VFR BLD AUTO: 46.1 % (ref 36.5–49.3)
HGB BLD-MCNC: 15.9 G/DL (ref 12–17)
IMM GRANULOCYTES # BLD AUTO: 0.05 THOUSAND/UL (ref 0–0.2)
IMM GRANULOCYTES NFR BLD AUTO: 1 % (ref 0–2)
LYMPHOCYTES # BLD AUTO: 1.11 THOUSANDS/ΜL (ref 0.6–4.47)
LYMPHOCYTES NFR BLD AUTO: 11 % (ref 14–44)
MCH RBC QN AUTO: 31.1 PG (ref 26.8–34.3)
MCHC RBC AUTO-ENTMCNC: 34.5 G/DL (ref 31.4–37.4)
MCV RBC AUTO: 90 FL (ref 82–98)
MONOCYTES # BLD AUTO: 1.45 THOUSAND/ΜL (ref 0.17–1.22)
MONOCYTES NFR BLD AUTO: 14 % (ref 4–12)
NEUTROPHILS # BLD AUTO: 7.95 THOUSANDS/ΜL (ref 1.85–7.62)
NEUTS SEG NFR BLD AUTO: 74 % (ref 43–75)
NRBC BLD AUTO-RTO: 0 /100 WBCS
PLATELET # BLD AUTO: 243 THOUSANDS/UL (ref 149–390)
PMV BLD AUTO: 9.5 FL (ref 8.9–12.7)
POTASSIUM SERPL-SCNC: 3.8 MMOL/L (ref 3.5–5.3)
PROT SERPL-MCNC: 7.9 G/DL (ref 6.4–8.2)
RBC # BLD AUTO: 5.11 MILLION/UL (ref 3.88–5.62)
SODIUM SERPL-SCNC: 135 MMOL/L (ref 136–145)
URATE SERPL-MCNC: 8.6 MG/DL (ref 4.2–8)
WBC # BLD AUTO: 10.6 THOUSAND/UL (ref 4.31–10.16)

## 2019-02-03 PROCEDURE — 99283 EMERGENCY DEPT VISIT LOW MDM: CPT

## 2019-02-03 PROCEDURE — 89060 EXAM SYNOVIAL FLUID CRYSTALS: CPT | Performed by: EMERGENCY MEDICINE

## 2019-02-03 PROCEDURE — 87070 CULTURE OTHR SPECIMN AEROBIC: CPT | Performed by: EMERGENCY MEDICINE

## 2019-02-03 PROCEDURE — 99220 PR INITIAL OBSERVATION CARE/DAY 70 MINUTES: CPT | Performed by: PHYSICIAN ASSISTANT

## 2019-02-03 PROCEDURE — 73560 X-RAY EXAM OF KNEE 1 OR 2: CPT

## 2019-02-03 PROCEDURE — 84550 ASSAY OF BLOOD/URIC ACID: CPT | Performed by: EMERGENCY MEDICINE

## 2019-02-03 PROCEDURE — 87205 SMEAR GRAM STAIN: CPT | Performed by: EMERGENCY MEDICINE

## 2019-02-03 PROCEDURE — 80053 COMPREHEN METABOLIC PANEL: CPT | Performed by: EMERGENCY MEDICINE

## 2019-02-03 PROCEDURE — 99284 EMERGENCY DEPT VISIT MOD MDM: CPT

## 2019-02-03 PROCEDURE — 85025 COMPLETE CBC W/AUTO DIFF WBC: CPT | Performed by: EMERGENCY MEDICINE

## 2019-02-03 PROCEDURE — 96374 THER/PROPH/DIAG INJ IV PUSH: CPT

## 2019-02-03 PROCEDURE — 89051 BODY FLUID CELL COUNT: CPT | Performed by: EMERGENCY MEDICINE

## 2019-02-03 PROCEDURE — 36415 COLL VENOUS BLD VENIPUNCTURE: CPT | Performed by: EMERGENCY MEDICINE

## 2019-02-03 RX ORDER — MORPHINE SULFATE 10 MG/ML
8 INJECTION, SOLUTION INTRAMUSCULAR; INTRAVENOUS ONCE
Status: COMPLETED | OUTPATIENT
Start: 2019-02-03 | End: 2019-02-03

## 2019-02-03 RX ORDER — ONDANSETRON 4 MG/1
8 TABLET, ORALLY DISINTEGRATING ORAL ONCE
Status: COMPLETED | OUTPATIENT
Start: 2019-02-03 | End: 2019-02-03

## 2019-02-03 RX ORDER — PREDNISONE 20 MG/1
40 TABLET ORAL ONCE
Status: COMPLETED | OUTPATIENT
Start: 2019-02-03 | End: 2019-02-03

## 2019-02-03 RX ORDER — COLCHICINE 0.6 MG/1
0.6 TABLET ORAL
Qty: 3 TABLET | Refills: 0 | Status: SHIPPED | OUTPATIENT
Start: 2019-02-03 | End: 2019-02-04 | Stop reason: HOSPADM

## 2019-02-03 RX ORDER — BUPIVACAINE HYDROCHLORIDE 5 MG/ML
30 INJECTION, SOLUTION EPIDURAL; INTRACAUDAL ONCE
Status: COMPLETED | OUTPATIENT
Start: 2019-02-03 | End: 2019-02-03

## 2019-02-03 RX ORDER — COLCHICINE 0.6 MG/1
1.2 TABLET ORAL ONCE
Status: COMPLETED | OUTPATIENT
Start: 2019-02-03 | End: 2019-02-03

## 2019-02-03 RX ADMIN — PREDNISONE 40 MG: 20 TABLET ORAL at 21:57

## 2019-02-03 RX ADMIN — BUPIVACAINE HYDROCHLORIDE 30 ML: 5 INJECTION, SOLUTION EPIDURAL; INTRACAUDAL at 22:30

## 2019-02-03 RX ADMIN — MORPHINE SULFATE 8 MG: 10 INJECTION INTRAVENOUS at 21:53

## 2019-02-03 RX ADMIN — COLCHICINE 1.2 MG: 0.6 TABLET, FILM COATED ORAL at 13:57

## 2019-02-03 RX ADMIN — ONDANSETRON 8 MG: 4 TABLET, ORALLY DISINTEGRATING ORAL at 22:58

## 2019-02-03 NOTE — ED PROVIDER NOTES
History  Chief Complaint   Patient presents with    Gout Pain     Patient has history of gout and arthritis, states pain is unbearable starting yesterday afternoon  Patient is a 52-year-old male who presents for evaluation of left knee swelling pain  He has a long history of gout and feels similar  He does have a insists is worry as gout in his knees and it feels the same  He does report that he has a couple days status post gastric bypass and is limited in what he can take for his pain  Denies any fever or chills  Denies any significant warmth  Denies redness or drainage  Is having trouble ambulating due to pain  Prior to Admission Medications   Prescriptions Last Dose Informant Patient Reported?  Taking?   acetaminophen (TYLENOL) 500 mg tablet  Self No No   Sig: Take 2 tablets by mouth every 6 (six) hours as needed for mild pain   Patient not taking: Reported on 10/3/2018    allopurinol (ZYLOPRIM) 300 mg tablet  Self No No   Sig: TAKE ONE TABLET BY MOUTH TWICE A DAY   amLODIPine (NORVASC) 2 5 mg tablet  Self No No   Sig: TAKE ONE TABLET BY MOUTH DAILY AS DIRECTED   fenofibrate (TRIGLIDE) 160 MG tablet   No No   Sig: TAKE ONE TABLET BY MOUTH DAILY   indomethacin (INDOCIN) 25 mg capsule  Self No No   Sig: Take 1 capsule (25 mg total) by mouth 3 (three) times a day   lisinopril (ZESTRIL) 30 mg tablet  Self No No   Sig: TAKE ONE TABLET BY MOUTH DAILY ( do not TAKE LISINOPRIL/HCTZ 20/25)   lovastatin (MEVACOR) 40 MG tablet   No No   Sig: TAKE ONE TABLET BY MOUTH AT BEDTIME   omeprazole (PriLOSEC) 20 mg delayed release capsule   No No   Sig: TAKE TWO CAPSULES BY MOUTH DAILY      Facility-Administered Medications: None       Past Medical History:   Diagnosis Date    Arthritis     GERD (gastroesophageal reflux disease)     Gout     Gout     Hypercholesteremia     Hypertension     Low back pain        Past Surgical History:   Procedure Laterality Date    COLONOSCOPY      LAPAROSCOPIC GASTRIC BANDING      [place] ESOPHAGOGASTRODUODENOSCOPY TRANSORAL DIAGNOSTIC N/A 7/10/2018    Procedure: EGD AND COLONOSCOPY;  Surgeon: Hailee Tobar MD;  [facility]; Service: Gastroenterology       Family History   Problem Relation Age of Onset    Diabetes Mother     Seizures Mother     No Known Problems Father     No Known Problems Family      I have reviewed and agree with the history as documented  Social History   Substance Use Topics    Smoking status: Never Smoker    Smokeless tobacco: Never Used    Alcohol use Yes      Comment: occasionally / social         Review of Systems   Constitutional: Negative for activity change, appetite change and fatigue  HENT: Negative for nosebleeds, sneezing, sore throat, trouble swallowing and voice change  Eyes: Negative for photophobia, pain and visual disturbance  Respiratory: Negative for apnea, choking and stridor  Cardiovascular: Negative for palpitations and leg swelling  Gastrointestinal: Negative for anal bleeding and constipation  Endocrine: Negative for cold intolerance, heat intolerance, polydipsia and polyphagia  Genitourinary: Negative for decreased urine volume, enuresis, frequency, genital sores and urgency  Musculoskeletal: Negative for joint swelling and myalgias  Allergic/Immunologic: Negative for environmental allergies and food allergies  Neurological: Negative for tremors, seizures, speech difficulty and weakness  Hematological: Negative for adenopathy  Psychiatric/Behavioral: Negative for behavioral problems, decreased concentration, dysphoric mood and hallucinations  Physical Exam  Physical Exam   Constitutional: He is oriented to person, place, and time  He appears well-developed and well-nourished  No distress  HENT:   Head: Normocephalic and atraumatic     Right Ear: External ear normal    Left Ear: External ear normal    Nose: Nose normal    Mouth/Throat: Oropharynx is clear and moist    Eyes: Pupils are equal, round, and reactive to light  Conjunctivae and EOM are normal    Neck: Normal range of motion  Neck supple  Cardiovascular: Normal rate, regular rhythm and normal heart sounds  Exam reveals no gallop and no friction rub  No murmur heard  Pulmonary/Chest: Effort normal and breath sounds normal  No respiratory distress  He has no wheezes  Abdominal: Soft  Bowel sounds are normal    Musculoskeletal: He exhibits tenderness  Suprapatellar tenderness of the left knee  No redness, warmth, or drainage  Distal NV exam is grossly intact  Neurological: He is alert and oriented to person, place, and time  Skin: Skin is warm and dry  He is not diaphoretic  Psychiatric: He has a normal mood and affect  His behavior is normal    Vitals reviewed  Vital Signs  ED Triage Vitals [02/03/19 1208]   Temperature Pulse Respirations Blood Pressure SpO2   (!) 97 1 °F (36 2 °C) 103 18 122/81 97 %      Temp Source Heart Rate Source Patient Position - Orthostatic VS BP Location FiO2 (%)   Tympanic Monitor Sitting Left arm --      Pain Score       Worst Possible Pain           Vitals:    02/03/19 1208   BP: 122/81   Pulse: 103   Patient Position - Orthostatic VS: Sitting       Visual Acuity      ED Medications  Medications   colchicine (COLCRYS) tablet 1 2 mg (not administered)       Diagnostic Studies  Results Reviewed     None                 XR knee 1 or 2 vw left   ED Interpretation by Elgin Ernandez PA-C (02/03 1331)   Effusion noted                    Procedures  Procedures       Phone Contacts  ED Phone Contact    ED Course                               MDM    Disposition  Final diagnoses:   Gout attack     Time reflects when diagnosis was documented in both MDM as applicable and the Disposition within this note     Time User Action Codes Description Comment    2/3/2019  1:46 PM Angelina Pinto Add [M10 9] Gout attack       ED Disposition     ED Disposition Condition Date/Time Comment    Discharge  Sun Feb 3, 2019  1:46 PM Analisa discharge to home/self care  Condition at discharge: Stable        Follow-up Information     Follow up With Specialties Details Why Contact Info    Black Herbert DO Family Medicine Schedule an appointment as soon as possible for a visit  [address] [place] [id number] [phone]            Patient's Medications   Discharge Prescriptions    COLCHICINE (COLCRYS) 0 6 MG TABLET    Take 1 tablet (0 6 mg total) by mouth 3 (three) times a day       Start Date: 2/3/2019  End Date: --       Order Dose: 0 6 mg       Quantity: 3 tablet    Refills: 0     No discharge procedures on file      ED Provider  Electronically Signed by           Shayla Collins PA-C  02/03/19 9183

## 2019-02-03 NOTE — DISCHARGE INSTRUCTIONS
Gota   CUIDADO AMBULATORIO:   Gota  es un tipo de artritis que causa un intenso dolor y rigidez en las articulaciones  El dolor de gota aguda empieza repentinamente, se empeora rápidamente y cesa por jacobo Fariba Lust  La gota aguda puede llegar a ser Therese Kaya y causar daño permanente en las articulaciones  Busque atención médica de inmediato si:   · Usted tiene dolor intenso en jaida o más articulaciones que no puede tolerar  · Usted tiene fiebre o enrojecimiento que se propaga más allá del área de la articulación  Pregúntele a jacobo [place] vitaminas y minerales son adecuados para usted  · Usted tiene síntomas nuevos, nu sarpullido, después de comenzar el tratamiento para la gota  · El dolor e inflamación en jacobo articulación no se desaparece, aún después del tratamiento  · Usted no está orinando tanto o con la frecuencia con que suele hacerlo  · Usted tiene problemas para oleksandr mayi medicamentos para la gota  · Usted tiene preguntas o inquietudes acerca de jacobo condición o cuidado  Las etapas de la gota:   · La hiperuricemia  empieza con niveles altos de ácido úrico  La hiperuricemia no es gota, kyle aumenta jacobo riesgo de presentar gota  Puede que usted no tenga síntomas en esta etapa y usualmente no necesita tratamiento  · La artritis gotosa aguda  empieza de repente con jaida ataque de inflamación y dolor, por lo general en jaida articulación  El ataque puede durar unos días hasta 2 semanas  · La gota intercrítica  es el tiempo entre ataques  Puede que pasen meses o años sin que usted tenga otro ataque  Usted no tendrá [employer] articulaciones o rigidez, kyle esto no significa que se ha curado de gota  Usted aún necesitará tratamiento para prevenir gota crónica  · La gota tofácea crónica  se desarrolla si la gota no recibe tratamiento  Grandes cantidades de niesha de ácido úrico, conocidos nu tofos, se acumulan alrededor de las articulaciones   [address] articulaciones  Ataques de gota ocurren más frecuentemente y pueden durar horas hasta semanas  Más de Daryle Moseley articulación puede estar inflamada y dolorida  En esta etapa, los síntomas de gota no desaparecen por sí solos  El tratamiento  puede hacer que mayi síntomas paren antes, prevenir ataques y disminuir jacobo riesgo de daño en las articulaciones  Es posible que usted necesite alguno de los siguientes:  · Medicamentos:      ¨ AINEs (Analgésicos antiinflamatorios no esteroides) nu el ibuprofeno, ayudan a disminuir la inflamación, el dolor y la fiebre  Amanda medicamento esta disponible con o sin jaida receta médica  Los AINEs pueden causar sangrado estomacal o problemas renales en ciertas personas  Si usted gabby un medicamento anticoagulante, siempre pregúntele a jacobo médico si los RISSA son seguros para usted  Siempre radha la etiqueta de amanda medicamento y [place] instrucciones  ¨ El medicamento para la gota  disminuye el dolor e inflamación en las articulaciones  También se puede administrar para prevenir nuevos ataques de gota  ¨ Esteroides  reducen la inflamación y pueden ayudarlo con la rigidez y el dolor en mayi articulaciones nate los ataques de [place]  ¨ El medicamento para ácido úrico  puede administrarse para reducir la cantidad de ácido úrico que jacobo cuerpo produce  Algunos medicamentos pueden ayudar a eliminar más ácido úrico al [place]  ¨ [place] mayi medicamentos nu se le haya indicado  Consulte con jacobo médico si usted william que jacobo medicamento no le está ayudando o si presenta efectos secundarios  Infórmele si es alérgico a cualquier medicamento  Mantenga jaida lista actualizada de los [place], las vitaminas y los productos herbales que gabby  Incluya los siguientes datos de los medicamentos: cantidad, frecuencia y motivo de administración  Traiga con usted la lista o los envases de la píldoras a mayi citas de seguimiento   Lleve la lista de los medicamentos con usted en fela de Daryle Collier emergencia  · Cirugía  de injerto óseo podría necesitarse para tratar los tofos dolorosos o infectados  El hueso en la articulación se reemplaza con un hueso extraído de otra parte de jacobo cuerpo  [place] a jacobo médico más información sobre la cirugía de [place]  Controle la gota:   · Descanse jacobo articulación adolorida para que pueda recuperarse  Jacobo médico podría recomendarle que use muletas o un caminador si la articulación afectada está en jaida pierna  · Aplique hielo a jacobo articulación  El hielo disminuye el dolor y la inflamación  Use un paquete de hielo o ponga hielo molido dentro de [employer]  [place] la bolsa o el paquete de hielo con jaida toalla antes de aplicarla en la articulación adolorida  Aplique hielo nate 15 a 20 minutos por hora o según indicaciones  · Eleve jacobo articulación  Las [place] le ayudará a disminuir la inflamación y el dolor  Eleve jacobo articulación por encima del nivel del corazón con la frecuencia que pueda  [place] jacobo articulación adolorida sobre almohadas para mantenerla cómodamente por encima del nivel del corazón  · Vaya a fisioterapia según le indicaron  Un fisioterapeuta le puede enseñar ejercicios para mejorar la flexibilidad y el rango de [place]  Prevenga ataques de gota:   · No consuma alimentos altos en purinas  Estos alimentos incluyen:sunitha, mariscos, espárragos, espinacas, coliflor, y algunos tipos de legumbres  Puede que los [employer] indiquen que coma más productos lácteos bajos en grasa, nu el yogur  Los productos lácteos pueden disminuir el riesgo de presentar ataques de [place]  La vitamina C y el café también puedan ayudar  Jacobo médico o un dietista puede ayudarle a crear un plan de comidas  · [employer] líquidos nu se le indique  Los líquidos nu el agua ayudan a eliminar el ácido úrico del cuerpo  Pregunte cuánto líquido debe oleksandr cada día y cuáles líquidos son los más adecuados para usted  · Controle jacobo peso    Bajar de peso puede disminuir la cantidad de ácido úrico en [place] cuerpo  El ejercicio lo puede ayudar con la pérdida de [place]  Consulte con [place] médico acerca de los ejercicios adecuados para usted  · Controle mayi niveles de azúcar en la ismael si usted tiene diabetes  Mantenga el nivel de azúcar en [place] ismael en un margen normal  [place] puede ayudar a prevenir ataques de gota  · Limite o no consuma bebidas alcohólicas, según indicaciones  El alcohol puede provocar un ataque de [place]  El alcohol también aumenta el riesgo de presentar deshidratación  Pregúntele a [place] médico si usted puede oleksandr alcohol  Acuda a mayi consultas de control con [place] médico según le indicaron  Anote mayi preguntas para que se acuerde de hacerlas nate mayi visitas  © 2017 [address] Information is for End User's use only and may not be sold, redistributed or otherwise used for commercial purposes  All illustrations and images included in CareNotes® are the copyrighted property of A D A M , Inc  or Steve Jc  Esta información es sólo para uso en educación  [place] intención no es darle un consejo médico sobre enfermedades o tratamientos  Colsulte con [place] Gaylyn Harsh farmacéutico antes de seguir cualquier régimen médico para saber si es seguro y efectivo para usted

## 2019-02-04 VITALS
WEIGHT: 254.41 LBS | HEIGHT: 68 IN | BODY MASS INDEX: 38.56 KG/M2 | OXYGEN SATURATION: 97 % | HEART RATE: 76 BPM | DIASTOLIC BLOOD PRESSURE: 79 MMHG | SYSTOLIC BLOOD PRESSURE: 122 MMHG | TEMPERATURE: 98.2 F | RESPIRATION RATE: 18 BRPM

## 2019-02-04 LAB
ALBUMIN SERPL BCP-MCNC: 2.9 G/DL (ref 3.5–5)
ALP SERPL-CCNC: 78 U/L (ref 46–116)
ALT SERPL W P-5'-P-CCNC: 56 U/L (ref 12–78)
ANION GAP SERPL CALCULATED.3IONS-SCNC: 12 MMOL/L (ref 4–13)
APPEARANCE FLD: ABNORMAL
AST SERPL W P-5'-P-CCNC: 26 U/L (ref 5–45)
BASOPHILS # BLD AUTO: 0.01 THOUSANDS/ΜL (ref 0–0.1)
BASOPHILS NFR BLD AUTO: 0 % (ref 0–1)
BILIRUB SERPL-MCNC: 0.9 MG/DL (ref 0.2–1)
BUN SERPL-MCNC: 19 MG/DL (ref 5–25)
CALCIUM SERPL-MCNC: 9.5 MG/DL (ref 8.3–10.1)
CHLORIDE SERPL-SCNC: 96 MMOL/L (ref 100–108)
CO2 SERPL-SCNC: 26 MMOL/L (ref 21–32)
COLOR FLD: YELLOW
CREAT SERPL-MCNC: 1.02 MG/DL (ref 0.6–1.3)
CRYSTALS SNV QL MICRO: NORMAL
EOSINOPHIL # BLD AUTO: 0 THOUSAND/ΜL (ref 0–0.61)
EOSINOPHIL NFR BLD AUTO: 0 % (ref 0–6)
ERYTHROCYTE [DISTWIDTH] IN BLOOD BY AUTOMATED COUNT: 12.5 % (ref 11.6–15.1)
GFR SERPL CREATININE-BSD FRML MDRD: 84 ML/MIN/1.73SQ M
GLUCOSE P FAST SERPL-MCNC: 127 MG/DL (ref 65–99)
GLUCOSE SERPL-MCNC: 127 MG/DL (ref 65–140)
HCT VFR BLD AUTO: 45 % (ref 36.5–49.3)
HGB BLD-MCNC: 15.3 G/DL (ref 12–17)
IMM GRANULOCYTES # BLD AUTO: 0.06 THOUSAND/UL (ref 0–0.2)
IMM GRANULOCYTES NFR BLD AUTO: 1 % (ref 0–2)
LYMPHOCYTES # BLD AUTO: 0.73 THOUSANDS/ΜL (ref 0.6–4.47)
LYMPHOCYTES NFR BLD AUTO: 7 % (ref 14–44)
MCH RBC QN AUTO: 31 PG (ref 26.8–34.3)
MCHC RBC AUTO-ENTMCNC: 34 G/DL (ref 31.4–37.4)
MCV RBC AUTO: 91 FL (ref 82–98)
MONOCYTES # BLD AUTO: 0.64 THOUSAND/ΜL (ref 0.17–1.22)
MONOCYTES NFR BLD AUTO: 6 % (ref 4–12)
MONOCYTES NFR BLD AUTO: 8 %
NEUTROPHILS # BLD AUTO: 8.84 THOUSANDS/ΜL (ref 1.85–7.62)
NEUTS SEG NFR BLD AUTO: 86 % (ref 43–75)
NEUTS SEG NFR BLD AUTO: 92 %
NRBC BLD AUTO-RTO: 0 /100 WBCS
PLATELET # BLD AUTO: 239 THOUSANDS/UL (ref 149–390)
PMV BLD AUTO: 9.2 FL (ref 8.9–12.7)
POTASSIUM SERPL-SCNC: 4.2 MMOL/L (ref 3.5–5.3)
PROT SERPL-MCNC: 7.6 G/DL (ref 6.4–8.2)
RBC # BLD AUTO: 4.93 MILLION/UL (ref 3.88–5.62)
SITE: ABNORMAL
SODIUM SERPL-SCNC: 134 MMOL/L (ref 136–145)
TOTAL CELLS COUNTED SPEC: 100
WBC # BLD AUTO: 10.28 THOUSAND/UL (ref 4.31–10.16)
WBC # FLD MANUAL: ABNORMAL /UL (ref 0–200)

## 2019-02-04 PROCEDURE — 97163 PT EVAL HIGH COMPLEX 45 MIN: CPT

## 2019-02-04 PROCEDURE — 99217 PR OBSERVATION CARE DISCHARGE MANAGEMENT: CPT | Performed by: HOSPITALIST

## 2019-02-04 PROCEDURE — 85025 COMPLETE CBC W/AUTO DIFF WBC: CPT | Performed by: PHYSICIAN ASSISTANT

## 2019-02-04 PROCEDURE — G8979 MOBILITY GOAL STATUS: HCPCS

## 2019-02-04 PROCEDURE — G8978 MOBILITY CURRENT STATUS: HCPCS

## 2019-02-04 PROCEDURE — 80053 COMPREHEN METABOLIC PANEL: CPT | Performed by: PHYSICIAN ASSISTANT

## 2019-02-04 RX ORDER — OXYCODONE HYDROCHLORIDE 10 MG/1
10 TABLET ORAL EVERY 6 HOURS PRN
Status: DISCONTINUED | OUTPATIENT
Start: 2019-02-04 | End: 2019-02-04

## 2019-02-04 RX ORDER — OXYCODONE HYDROCHLORIDE 5 MG/1
5 TABLET ORAL EVERY 4 HOURS PRN
Status: COMPLETED | OUTPATIENT
Start: 2019-02-04 | End: 2019-02-04

## 2019-02-04 RX ORDER — OXYCODONE HYDROCHLORIDE 5 MG/1
5 TABLET ORAL EVERY 4 HOURS PRN
Status: DISCONTINUED | OUTPATIENT
Start: 2019-02-04 | End: 2019-02-04

## 2019-02-04 RX ORDER — PRAVASTATIN SODIUM 40 MG
40 TABLET ORAL
Status: DISCONTINUED | OUTPATIENT
Start: 2019-02-04 | End: 2019-02-04 | Stop reason: HOSPADM

## 2019-02-04 RX ORDER — AMLODIPINE BESYLATE 2.5 MG/1
2.5 TABLET ORAL DAILY
Status: DISCONTINUED | OUTPATIENT
Start: 2019-02-04 | End: 2019-02-04 | Stop reason: HOSPADM

## 2019-02-04 RX ORDER — OXYCODONE HYDROCHLORIDE 10 MG/1
10 TABLET ORAL EVERY 6 HOURS PRN
Qty: 12 TABLET | Refills: 0 | Status: SHIPPED | OUTPATIENT
Start: 2019-02-04 | End: 2019-02-08

## 2019-02-04 RX ORDER — HEPARIN SODIUM 5000 [USP'U]/ML
5000 INJECTION, SOLUTION INTRAVENOUS; SUBCUTANEOUS EVERY 8 HOURS SCHEDULED
Status: DISCONTINUED | OUTPATIENT
Start: 2019-02-04 | End: 2019-02-04 | Stop reason: HOSPADM

## 2019-02-04 RX ORDER — PREDNISONE 20 MG/1
60 TABLET ORAL DAILY
Status: DISCONTINUED | OUTPATIENT
Start: 2019-02-05 | End: 2019-02-04

## 2019-02-04 RX ORDER — OXYCODONE HYDROCHLORIDE 10 MG/1
10 TABLET ORAL EVERY 6 HOURS PRN
Status: DISCONTINUED | OUTPATIENT
Start: 2019-02-04 | End: 2019-02-04 | Stop reason: HOSPADM

## 2019-02-04 RX ORDER — FENOFIBRATE 145 MG/1
145 TABLET, COATED ORAL DAILY
Status: DISCONTINUED | OUTPATIENT
Start: 2019-02-04 | End: 2019-02-04 | Stop reason: HOSPADM

## 2019-02-04 RX ORDER — PANTOPRAZOLE SODIUM 40 MG/1
40 TABLET, DELAYED RELEASE ORAL
Status: DISCONTINUED | OUTPATIENT
Start: 2019-02-04 | End: 2019-02-04 | Stop reason: HOSPADM

## 2019-02-04 RX ORDER — PREDNISONE 20 MG/1
60 TABLET ORAL DAILY
Qty: 12 TABLET | Refills: 0 | Status: SHIPPED | OUTPATIENT
Start: 2019-02-05 | End: 2019-02-09

## 2019-02-04 RX ORDER — PREDNISONE 20 MG/1
60 TABLET ORAL DAILY
Status: DISCONTINUED | OUTPATIENT
Start: 2019-02-04 | End: 2019-02-04 | Stop reason: HOSPADM

## 2019-02-04 RX ORDER — ONDANSETRON 2 MG/ML
4 INJECTION INTRAMUSCULAR; INTRAVENOUS EVERY 8 HOURS PRN
Status: DISCONTINUED | OUTPATIENT
Start: 2019-02-04 | End: 2019-02-04 | Stop reason: HOSPADM

## 2019-02-04 RX ORDER — ACETAMINOPHEN 325 MG/1
650 TABLET ORAL EVERY 6 HOURS PRN
Status: DISCONTINUED | OUTPATIENT
Start: 2019-02-04 | End: 2019-02-04 | Stop reason: HOSPADM

## 2019-02-04 RX ORDER — ALLOPURINOL 300 MG/1
300 TABLET ORAL 2 TIMES DAILY
Status: DISCONTINUED | OUTPATIENT
Start: 2019-02-04 | End: 2019-02-04 | Stop reason: HOSPADM

## 2019-02-04 RX ADMIN — ALLOPURINOL 300 MG: 300 TABLET ORAL at 09:01

## 2019-02-04 RX ADMIN — FENOFIBRATE 145 MG: 145 TABLET, FILM COATED ORAL at 09:01

## 2019-02-04 RX ADMIN — OXYCODONE HYDROCHLORIDE 5 MG: 5 TABLET ORAL at 14:14

## 2019-02-04 RX ADMIN — OXYCODONE HYDROCHLORIDE 5 MG: 5 TABLET ORAL at 09:08

## 2019-02-04 RX ADMIN — HEPARIN SODIUM 5000 UNITS: 5000 INJECTION, SOLUTION INTRAVENOUS; SUBCUTANEOUS at 05:29

## 2019-02-04 RX ADMIN — LISINOPRIL 30 MG: 10 TABLET ORAL at 09:01

## 2019-02-04 RX ADMIN — PREDNISONE 60 MG: 20 TABLET ORAL at 11:21

## 2019-02-04 RX ADMIN — PANTOPRAZOLE SODIUM 40 MG: 40 TABLET, DELAYED RELEASE ORAL at 05:29

## 2019-02-04 RX ADMIN — HEPARIN SODIUM 5000 UNITS: 5000 INJECTION, SOLUTION INTRAVENOUS; SUBCUTANEOUS at 14:15

## 2019-02-04 RX ADMIN — AMLODIPINE BESYLATE 2.5 MG: 2.5 TABLET ORAL at 09:01

## 2019-02-04 NOTE — PLAN OF CARE
Problem: Potential for Falls  Goal: Patient will remain free of falls  INTERVENTIONS:  - Assess patient frequently for physical needs  -  Identify cognitive and physical deficits and behaviors that affect risk of falls    -  [place] fall precautions as indicated by assessment   - Educate patient/family on patient safety including physical limitations  - Instruct patient to call for assistance with activity based on assessment  - Modify environment to reduce risk of injury  - Consider OT/PT consult to assist with strengthening/mobility   Outcome: Progressing      Problem: PAIN - ADULT  Goal: Verbalizes/displays adequate comfort level or baseline comfort level  Interventions:  - Encourage patient to monitor pain and request assistance  - Assess pain using appropriate pain scale  - Administer analgesics based on type and severity of pain and evaluate response  - Implement non-pharmacological measures as appropriate and evaluate response  - Consider cultural and social influences on pain and pain management  - Notify physician/advanced practitioner if interventions unsuccessful or patient reports new pain   Outcome: Progressing

## 2019-02-04 NOTE — PLAN OF CARE
Problem: DISCHARGE PLANNING - CARE MANAGEMENT  Goal: Discharge to post-acute care or home with appropriate resources  INTERVENTIONS:  - Conduct assessment to determine patient/family and health care team treatment goals, and need for post-acute services based on payer coverage, community resources, and patient preferences, and barriers to discharge  - Address psychosocial, clinical, and financial barriers to discharge as identified in assessment in conjunction with the patient/family and health care team  - Arrange appropriate level of post-acute services according to patients   needs and preference and payer coverage in collaboration with the physician and health care team  - Communicate with and update the patient/family, physician, and health care team regarding progress on the discharge plan  - Arrange appropriate transportation to post-acute venues  Outcome: Progressing  Observation notice reviewed  Copy provided to patient  Copy placed in medical records       Patient was seen by PT today and verbal recommendation is currently outpatient PT per Reg Osman  CM reviewed information with [facility] and obtained signed script for walker through [employer]  CM provided patient a copy of the outpatient PT "blue List" for patient at bedside  CM reviewed with patient the need for a walker  Patient informed Cm that he would prefer walker from [employer] medical supplies  CM obtained walker from [employer]  supplies and provided patient with new walker  Patient signed young's medical form indicated that he received the item  Patient does not have any other needs at this time and will discharge later today pending medical clearance  CM will follow through discharge

## 2019-02-04 NOTE — ED PROCEDURE NOTE
PROCEDURE  Arthrocentesis  Date/Time: 2/3/2019 11:06 PM  Performed by: Danay Roldan  Authorized by: Yessi RUBALCAVA     Location:  Bedside  Verbal consent obtained?: Yes    Risks and benefits: Risks, benefits and alternatives were discussed    Consent given by:  Patient (brother)  Patient states understanding of procedure being performed: Yes    Patient's understanding of procedure matches consent: Yes    Patient identity confirmed:  Verbally with patient  Indications:  Joint swelling and diagnostic evaluation (left prepatellar effusion)  Location: left prepatellar bursa    Local anesthesia used?: Yes    Anesthesia:  Local infiltration  Local anesthetic:  Bupivacaine 0 5% without epinephrine  Anesthetic total (ml):  3  Preparation: Patient was prepped and draped in usual sterile fashion    Needle size:  18 G  Ultrasound guidance: Yes    Equipment Used:  9 mhrz rpobe to jaylon out left prepatellar fluid collextion   Approach:  Lateral  Aspirate amount (ml):  20  Aspirate:  Yellow  Patient tolerance:  Patient tolerated the procedure well with no immediate complications    After bursa fluid removed - needle left in place and 5 ml of  5 % marcaine instilled in bursa under negative pressure with no blood aspirate prior to ijnection         Lisa Perez MD  02/03/19 5423

## 2019-02-04 NOTE — H&P
H&P- Omid Panchal 1965, 47 y o  male MRN: [id number]    Unit/Bed#: -01 Encounter: [id number]    Primary Care Provider: Ernie Garcia DO   Date and time admitted to hospital: 2/3/2019  9:28 PM    * Polyarthralgia   Assessment & Plan    · Presents with c/o joint pain at multiple sites   · H/o poorly controlled Gouty arthritis, pt report noncompliance with allopurinol   · Prepatellar effusion tapped in ED  · Synovial fluid analysis pending: WBC, gram stain and cx, crystals  · Serum uric acid 8 6  · WBC 10, afebrile  · Doubt septic joint/bursitis, will monitor off of abx and f/u fluid analysis  · Avoid NSAID given h/o bypass  · PO prednisone started in ED  · Will give 0 5 mg/kg prednisone x5 days = 57 mg per day, will round up to 60 mg daily x5 days  · PT eval and treat   · Monitor CBC      Gout   Assessment & Plan    · Resume allopurinol BID  · Cannot take his indomethacin 2/2 recent bypass, PO prednisone     Dyslipidemia   Assessment & Plan    · Cont statin and fenofibrate     Benign essential hypertension   Assessment & Plan    · Controlled, continue lisinopril and amlodipine     Morbid obesity ([facility] [address] )   Assessment & Plan    · Sp gastric bypass end of Jan  · Avoid NSAIDs  · Cont heparin for DVT prophylaxis     Hyperbilirubinemia   Assessment & Plan    · T  Bili 1 4 today, encourage fluid intake, repeat CMP       VTE Prophylaxis: Heparin  / reason for no mechanical VTE prophylaxis : ambulate when able   Code Status: FULL  POLST: POLST form is not discussed and not completed at this time  Discussion with family: none    Anticipated Length of Stay:  Patient will be admitted on an Observation basis with an anticipated length of stay of  < 2 midnights  Justification for Hospital Stay: per plan above    Total Time for Visit, including Counseling / Coordination of Care: 30 minutes  Greater than 50% of this total time spent on direct patient counseling and coordination of care      Chief Complaint:   Knee pain/elbow pain    History of Present Illness:    Olga Carver is a 47 y o  male with PMHx of HTN, Gout, Gastric bypass, HLD who presents with arthralgias  Pt states he has bypass the end of January and has experienced worsening joint pain since  He reports progressing severity and says initally his pain began in his toes bilaterally  Yesterday the pain was most severe in his right knee and today he had severe pain in the left knee  He has some mild pain in the elbows today as well  Pt states he was essentially unable to ambulate today due to the pain  He does admit to taking his allopurinol very infrequently  He has not taken colchicine or Indocin either  Reports improving abdominal pain and nausea following his surgery  Denies vomiting or diarrhea  He did "feel warm" last night but did not take a temperature  Denies subjective fevers today  Denies HA or dizziness  Denies CP or SOB  Denies LE edema  Does admit to some paraesthesias, denies numbness and weakness  Admits to warmth and swelling of the joint especially the left knee today  Review of Systems:    Review of Systems   Constitutional: Positive for fever  HENT: Negative  Eyes: Negative  Respiratory: Negative  Cardiovascular: Negative  Gastrointestinal: Positive for abdominal pain and nausea  Endocrine: Negative  Genitourinary: Negative  Musculoskeletal: Positive for arthralgias, gait problem and joint swelling  Skin: Positive for color change  Allergic/Immunologic: Negative  Neurological: Positive for weakness  Hematological: Negative  Psychiatric/Behavioral: Negative          Past Medical and Surgical History:     Past Medical History:   Diagnosis Date    Arthritis     GERD (gastroesophageal reflux disease)     Gout     Gout     Hypercholesteremia     Hypertension     Low back pain        Past Surgical History:   Procedure Laterality Date    COLONOSCOPY      LAPAROSCOPIC GASTRIC BANDING      [place] ESOPHAGOGASTRODUODENOSCOPY TRANSORAL DIAGNOSTIC N/A 7/10/2018    Procedure: EGD AND COLONOSCOPY;  Surgeon: Hailee Tobar MD;  [facility]: [facility]; Service: Gastroenterology       Meds/Allergies:    Prior to Admission medications    Medication Sig Start Date End Date Taking? Authorizing Provider   acetaminophen (TYLENOL) 500 mg tablet Take 2 tablets by mouth every 6 (six) hours as needed for mild pain  Patient not taking: Reported on 10/3/2018  12/1/17   Man Shoemaker MD   allopurinol (ZYLOPRIM) 300 mg tablet TAKE ONE TABLET BY MOUTH TWICE A DAY 9/19/18   Gabriel Shin MD   amLODIPine (NORVASC) 2 5 mg tablet TAKE ONE TABLET BY MOUTH DAILY AS DIRECTED 9/19/18   Gabriel Shin MD   colchicine (COLCRYS) 0 6 mg tablet Take 1 tablet (0 6 mg total) by mouth 3 (three) times a day 2/3/19   Adarsh Rebollar PA-C   fenofibrate (TRIGLIDE) 160 MG tablet TAKE ONE TABLET BY MOUTH DAILY 11/15/18   Gabriel Shin MD   indomethacin (INDOCIN) 25 mg capsule Take 1 capsule (25 mg total) by mouth 3 (three) times a day 7/2/18   Gabriel Shin MD   lisinopril (ZESTRIL) 30 mg tablet TAKE ONE TABLET BY MOUTH DAILY ( do not TAKE LISINOPRIL/HCTZ 20/25) 9/19/18   Gabriel Shin MD   lovastatin (MEVACOR) 40 MG tablet TAKE ONE TABLET BY MOUTH AT BEDTIME 11/15/18   Gabriel Shin MD   omeprazole (PriLOSEC) 20 mg delayed release capsule TAKE TWO CAPSULES BY MOUTH DAILY 12/17/18   Hailee Tobar MD     I have reviewed home medications with patient personally      Allergies: No Known Allergies    Social History:     Marital Status: /Civil Union   Occupation: not discussed  Patient Pre-hospital Living Situation: not discussed  Patient Pre-hospital Level of Mobility: independent  Patient Pre-hospital Diet Restrictions: none  Substance Use History:   History   Alcohol Use    Yes     Comment: occasionally / social      History   Smoking Status    Never Smoker   Smokeless Tobacco    Never Used     History   Drug Use No Family History:    non-contributory    Physical Exam:     Vitals:   Blood Pressure: 140/75 (02/03/19 2357)  Pulse: 81 (02/03/19 2357)  Temperature: 99 °F (37 2 °C) (02/03/19 2357)  Temp Source: Oral (02/03/19 2357)  Respirations: 18 (02/03/19 2357)  Height: 5' 8" (172 7 cm) (02/03/19 2357)  Weight - Scale: 115 kg (254 lb 6 6 oz) (02/03/19 2357)  SpO2: 95 % (02/03/19 2357)    Physical Exam   Constitutional: He appears well-developed and well-nourished  No distress  Cardiovascular: Normal rate, regular rhythm, normal heart sounds and intact distal pulses  Exam reveals no gallop and no friction rub  No murmur heard  Pulmonary/Chest: Effort normal and breath sounds normal  No respiratory distress  He has no wheezes  He has no rales  He exhibits no tenderness  Abdominal: Soft  Bowel sounds are normal  He exhibits no distension and no mass  There is tenderness (mild epigastric ttp)  There is no rebound and no guarding  Musculoskeletal: Normal range of motion  He exhibits edema and tenderness  Joint effusion appreciable of R knee, ttp b/l knee  Nontender over bilateral MTP  No hip pain, mild elbow tenderness   Neurological: He is alert  Full and symmetric sensation b/l LE's   Skin: No rash noted  He is not diaphoretic  There is erythema  No pallor  Incisions healing well, no drainage, mild bruising at heparin injection sites   Psychiatric: He has a normal mood and affect  His behavior is normal    Nursing note and vitals reviewed  Additional Data:     Lab Results: I have personally reviewed pertinent reports          Results from last 7 days  Lab Units 02/03/19  2150   WBC Thousand/uL 10 60*   HEMOGLOBIN g/dL 15 9   HEMATOCRIT % 46 1   PLATELETS Thousands/uL 243   NEUTROS PCT % 74   LYMPHS PCT % 11*   MONOS PCT % 14*   EOS PCT % 0       Results from last 7 days  Lab Units 02/03/19  2151   SODIUM mmol/L 135*   POTASSIUM mmol/L 3 8   CHLORIDE mmol/L 95*   CO2 mmol/L 27   BUN mg/dL 19   CREATININE mg/dL 1 20   ANION GAP mmol/L 13   CALCIUM mg/dL 9 5   ALBUMIN g/dL 3 3*   TOTAL BILIRUBIN mg/dL 1 40*   ALK PHOS U/L 83   ALT U/L 66   AST U/L 26   GLUCOSE RANDOM mg/dL 104                       Imaging: I have personally reviewed pertinent reports  No orders to display       Allscripts / Epic Records Reviewed: Yes     ** Please Note: This note has been constructed using a voice recognition system   **

## 2019-02-04 NOTE — UTILIZATION REVIEW
Initial Clinical Review    Admission: Date/Time/Statement: 2/3/2019  2312 OBSERVATION     Orders Placed This Encounter   Procedures    Place in Observation (expected length of stay for this patient is less than two midnights)     Standing Status:   Standing     Number of Occurrences:   1     Order Specific Question:   Admitting Physician     Answer:   Laila Swann     Order Specific Question:   Level of Care     Answer:   Med Surg [16]         ED: Date/Time/Mode of Arrival:   ED Arrival Information     Expected Arrival Acuity Means of Arrival Escorted By Service Admission Type    - 2/3/2019 21:20 Urgent Wheelchair Family Member General Medicine Urgent    Arrival Complaint    Gout flare up;post op complication          Chief Complaint:   Chief Complaint   Patient presents with    Gout Pain     per pt "about 3 days ago he got some gout flare ups in his legs pt  states he was seen this afternoon @ [place], pt had abdominal surgery about 5 days ago also "       History of Illness: 47 y o  male with PMHx of HTN, Gout, Gastric bypass, HLD who presents with arthralgias  Pt states he has bypass the end of January and has experienced worsening joint pain since  He reports progressing severity and says initally his pain began in his toes bilaterally  Yesterday the pain was most severe in his right knee and today he had severe pain in the left knee  He has some mild pain in the elbows today as well  Pt states he was essentially unable to ambulate today due to the pain  He does admit to taking his allopurinol very infrequently  He has not taken colchicine or Indocin either  Reports improving abdominal pain and nausea following his surgery  Denies vomiting or diarrhea  He did "feel warm" last night but did not take a temperature  Does admit to some paraesthesias, denies numbness and weakness    Admits to warmth and swelling of the joint especially the left knee today          ED Vital Signs:   ED Triage Vitals   Temperature Pulse Respirations Blood Pressure SpO2   02/03/19 2141 02/03/19 2136 02/03/19 2136 02/03/19 2136 02/03/19 2136   99 °F (37 2 °C) 101 18 137/92 97 %      Temp Source Heart Rate Source Patient Position - Orthostatic VS BP Location FiO2 (%)   02/03/19 2141 02/03/19 2136 02/03/19 2136 02/03/19 2136 --   Oral Monitor Lying Left arm       Pain Score       02/03/19 2136       Worst Possible Pain        Wt Readings from Last 1 Encounters:   02/03/19 115 kg (254 lb 6 6 oz)       Vital Signs (abnormal): maximum temperature 99    Abnormal Labs/Diagnostic Test Results:   Na 135  Cl 95  Albumin 3 3  Total bilirubin 1 40  Uric acid 8 6  Wbc 10 60    Synovial fluid -  Positive for monosodium urate crystals  no crystals seen  Cloudy                          Wbc 108, 464    2/4/2019-  Wbc 10 28,  Na 134  Cl 96  Glucose 127  Albumin 2 9  ED Treatment:   Medication Administration from 02/03/2019 2120 to 02/03/2019 2346       Date/Time Order Dose Route Action Comments     02/03/2019 2153 morphine (PF) 10 mg/mL injection 8 mg 8 mg Intravenous Given      02/03/2019 2157 predniSONE tablet 40 mg 40 mg Oral Given      02/03/2019 2230 bupivacaine (PF) (MARCAINE) 0 5 % injection 30 mL 30 mL Infiltration Given Given to provider for administration     02/03/2019 2258 ondansetron (ZOFRAN-ODT) dispersible tablet 8 mg 8 mg Oral Given           Past Medical/Surgical History:   Past Medical History:   Diagnosis Date    Arthritis     GERD (gastroesophageal reflux disease)     Gout     Gout     Hypercholesteremia     Hypertension     Low back pain        Admitting Diagnosis: Gout [M10 9]  Generalized gouty arthritis [M10 9]  Ambulatory dysfunction [R26 2]    Age/Sex: 47 y o  male    Assessment/Plan: This is a 47year old male from home with past medical history of hypertension, gastric bypass, gout who presented to ED with arthralgia    Patient states since his bypass he has worsening joint pain, initially starting at toes, then most severe in right knee, now severe in left knee and elbows  Pain is so severe he can not ambulate  He went to [facility] yesterday and prescribed colchicine, he admits to poor compliance with allupurinol  In the ED prepatellar effusion tapped, serum uric acid 8 6  And po prednisone started  Patient is admitted to observation with polyarthralgia, suspect gouty arthritis, plan includes to monitor CBC, follow up on synovial fluid analysis, continue prednisone, PT consult, resume allopurinol, pain control   Admission Orders:  2/3/2019  2312 OBSERVATION   Scheduled Meds:   Current Facility-Administered Medications:  acetaminophen 650 mg Oral Q6H PRN   allopurinol 300 mg Oral BID   amLODIPine 2 5 mg Oral Daily   fenofibrate 145 mg Oral Daily   heparin (porcine) 5,000 Units Subcutaneous Q8H [facility]   lisinopril 30 mg Oral Daily   ondansetron 4 mg Intravenous Q8H PRN   oxyCODONE 10 mg Oral Q6H PRN   oxyCODONE 5 mg Oral Q4H PRN   pantoprazole 40 mg Oral Early Morning   pravastatin 40 mg Oral Daily With Dinner   [START ON 2/5/2019] predniSONE 60 mg Oral Daily     Continuous Infusions:    PRN Meds:    oxyCODONE 5 mg - used x 1       PT

## 2019-02-04 NOTE — DISCHARGE INSTRUCTIONS
You were hospitalized due to gout flare  You were cared for on the 4th floor under the service of Damien Alexander MD with the [facility] who covers for [facility], DO while you were hospitalized  If you have any questions or concerns related to this hospitalization, you may contact us at [phone]  For follow up, we recommend that you follow up with your primary care physician  We also provide the following instructions / recommendations at discharge:       Please continue to take the prednisone as prescribed (4 additional days)  It should be taken with your diet to avoid irritating the stomach  If your symptoms are not fully relieved after this short course, you may need to contact your primary care physician for further instructions

## 2019-02-04 NOTE — PLAN OF CARE
Problem: Potential for Falls  Goal: Patient will remain free of falls  INTERVENTIONS:  - Assess patient frequently for physical needs  -  Identify cognitive and physical deficits and behaviors that affect risk of falls    -  [place] fall precautions as indicated by assessment   - Educate patient/family on patient safety including physical limitations  - Instruct patient to call for assistance with activity based on assessment  - Modify environment to reduce risk of injury  - Consider OT/PT consult to assist with strengthening/mobility   Outcome: Progressing      Problem: PAIN - ADULT  Goal: Verbalizes/displays adequate comfort level or baseline comfort level  Interventions:  - Encourage patient to monitor pain and request assistance  - Assess pain using appropriate pain scale  - Administer analgesics based on type and severity of pain and evaluate response  - Implement non-pharmacological measures as appropriate and evaluate response  - Consider cultural and social influences on pain and pain management  - Notify physician/advanced practitioner if interventions unsuccessful or patient reports new pain  Outcome: Progressing

## 2019-02-04 NOTE — ASSESSMENT & PLAN NOTE
· Presents with c/o joint pain at multiple sites   · H/o poorly controlled Gouty arthritis, pt report noncompliance with allopurinol   · Prepatellar effusion tapped in ED  · Synovial fluid analysis pending: WBC, gram stain and cx, crystals  · Serum uric acid 8 6  · WBC 10, afebrile  · Doubt septic joint/bursitis, will monitor off of abx and f/u fluid analysis  · Avoid NSAID given h/o bypass  · PO prednisone started in ED  · Will give 0 5 mg/kg prednisone x5 days = 57 mg per day, will round up to 60 mg daily x5 days  · PT eval and treat   · Monitor CBC

## 2019-02-04 NOTE — ED PROVIDER NOTES
History  Chief Complaint   Patient presents with    Gout Pain     per pt "about 3 days ago he got some gout flare ups in his legs pt  states he was seen this afternoon @ [place], pt had abdominal surgery about 5 days ago also "     47 yr male - s/p gastric sleeve surg 5 days agio- with no problems from surg- on sq heparin tid for 1 month -- states over last 3 days with gout flare- with pain in r foot- r elbow- and above both knees- pt has been unable to ambulate today  At al- seen earlier  Had knee xray given colchicine/dilaudid with no relief-         History provided by:  Patient and relative   used: No        Prior to Admission Medications   Prescriptions Last Dose Informant Patient Reported?  Taking?   acetaminophen (TYLENOL) 500 mg tablet  Self No No   Sig: Take 2 tablets by mouth every 6 (six) hours as needed for mild pain   Patient not taking: Reported on 10/3/2018    allopurinol (ZYLOPRIM) 300 mg tablet  Self No No   Sig: TAKE ONE TABLET BY MOUTH TWICE A DAY   amLODIPine (NORVASC) 2 5 mg tablet  Self No No   Sig: TAKE ONE TABLET BY MOUTH DAILY AS DIRECTED   colchicine (COLCRYS) 0 6 mg tablet   No No   Sig: Take 1 tablet (0 6 mg total) by mouth 3 (three) times a day   fenofibrate (TRIGLIDE) 160 MG tablet   No No   Sig: TAKE ONE TABLET BY MOUTH DAILY   indomethacin (INDOCIN) 25 mg capsule  Self No No   Sig: Take 1 capsule (25 mg total) by mouth 3 (three) times a day   lisinopril (ZESTRIL) 30 mg tablet  Self No No   Sig: TAKE ONE TABLET BY MOUTH DAILY ( do not TAKE LISINOPRIL/HCTZ 20/25)   lovastatin (MEVACOR) 40 MG tablet   No No   Sig: TAKE ONE TABLET BY MOUTH AT BEDTIME   omeprazole (PriLOSEC) 20 mg delayed release capsule   No No   Sig: TAKE TWO CAPSULES BY MOUTH DAILY      Facility-Administered Medications: None       Past Medical History:   Diagnosis Date    Arthritis     GERD (gastroesophageal reflux disease)     Gout     Gout     Hypercholesteremia     Hypertension     Low back pain        Past Surgical History:   Procedure Laterality Date    COLONOSCOPY      LAPAROSCOPIC GASTRIC BANDING      [place] ESOPHAGOGASTRODUODENOSCOPY TRANSORAL DIAGNOSTIC N/A 7/10/2018    Procedure: EGD AND COLONOSCOPY;  Surgeon: Luda Flores MD;  Location: [facility]; Service: Gastroenterology       Family History   Problem Relation Age of Onset    Diabetes Mother     Seizures Mother     No Known Problems Father     No Known Problems Family      I have reviewed and agree with the history as documented  Social History   Substance Use Topics    Smoking status: Never Smoker    Smokeless tobacco: Never Used    Alcohol use Yes      Comment: occasionally / social         Review of Systems   Constitutional: Negative  HENT: Negative  Eyes: Negative  Respiratory: Negative  Cardiovascular: Negative  Gastrointestinal: Negative  Endocrine: Negative  Genitourinary: Negative  Musculoskeletal: Positive for arthralgias and gait problem  Negative for back pain, joint swelling, myalgias, neck pain and neck stiffness  Skin: Negative  Allergic/Immunologic: Negative  Neurological: Negative for dizziness, tremors, seizures, syncope, facial asymmetry, speech difficulty, weakness, light-headedness, numbness and headaches  Hematological: Negative  Psychiatric/Behavioral: Negative  Physical Exam  Physical Exam   Constitutional: He is oriented to person, place, and time  He appears well-developed and well-nourished  He appears distressed  avss- pulse ox 98 % on ra- interpretation is normal- no intervention    HENT:   Head: Normocephalic and atraumatic  Eyes: Pupils are equal, round, and reactive to light  Conjunctivae and EOM are normal  Right eye exhibits no discharge  Left eye exhibits no discharge  No scleral icterus  Mm pink   Neck: Normal range of motion  Neck supple  No JVD present  No tracheal deviation present  No thyromegaly present     Cardiovascular: Normal rate, regular rhythm, normal heart sounds and intact distal pulses  Exam reveals no gallop and no friction rub  No murmur heard  Pulmonary/Chest: Effort normal and breath sounds normal  No stridor  No respiratory distress  He has no wheezes  He has no rales  He exhibits no tenderness  Abdominal: Soft  Bowel sounds are normal  He exhibits no distension and no mass  There is no tenderness  There is no rebound and no guarding  No hernia  Lap surg incision -- abd soft- nt/nd   Musculoskeletal: Normal range of motion  He exhibits edema and tenderness  He exhibits no deformity  r elbow- mild olecranon area sts/tenderness- r foot- lateral dorsum area tenderness/sts-  -pos bilateral left greater than right prepatellar bursa sts/ tenderness- edema- no overlying erythema   Lymphadenopathy:     He has no cervical adenopathy  Neurological: He is alert and oriented to person, place, and time  No cranial nerve deficit or sensory deficit  He exhibits normal muscle tone  Coordination normal    Skin: Skin is warm  Capillary refill takes less than 2 seconds  No rash noted  He is not diaphoretic  No erythema  No pallor  Psychiatric: He has a normal mood and affect  His behavior is normal  Judgment and thought content normal    Nursing note and vitals reviewed        Vital Signs  ED Triage Vitals   Temperature Pulse Respirations Blood Pressure SpO2   02/03/19 2141 02/03/19 2136 02/03/19 2136 02/03/19 2136 02/03/19 2136   99 °F (37 2 °C) 101 18 137/92 97 %      Temp Source Heart Rate Source Patient Position - Orthostatic VS BP Location FiO2 (%)   02/03/19 2141 02/03/19 2136 02/03/19 2136 02/03/19 2136 --   Oral Monitor Lying Left arm       Pain Score       02/03/19 2136       Worst Possible Pain           Vitals:    02/03/19 2136 02/03/19 2230 02/03/19 2337   BP: 137/92 140/83 145/81   Pulse: 101 88 90   Patient Position - Orthostatic VS: Lying Lying Lying       Visual Acuity      ED Medications  Medications   morphine (PF) 10 mg/mL injection 8 mg (8 mg Intravenous Given 2/3/19 2153)   predniSONE tablet 40 mg (40 mg Oral Given 2/3/19 2157)   bupivacaine (PF) (MARCAINE) 0 5 % injection 30 mL (30 mL Infiltration Given 2/3/19 2230)   ondansetron (ZOFRAN-ODT) dispersible tablet 8 mg (8 mg Oral Given 2/3/19 2258)       Diagnostic Studies  Results Reviewed     Procedure Component Value Units Date/Time    Body fluid culture and Gram stain [[id number]] Collected:  02/03/19 2300    Lab Status: In process Specimen: Body Fluid from Joint, Left Knee Updated:  02/03/19 2307    Body fluid white cell count with differential [[id number]] Collected:  02/03/19 2300    Lab Status: In process Specimen: Body Fluid from Joint, Other Updated:  02/03/19 2306    Synovial fluid, crystal [[id number]] Collected:  02/03/19 2300    Lab Status: In process Specimen:  Synovial Fluid from Other Updated:  02/03/19 2306    Comprehensive metabolic panel [[id number]]  (Abnormal) Collected:  02/03/19 2151    Lab Status:  Final result Specimen:  Blood from Arm, Left Updated:  02/03/19 2219     Sodium 135 (L) mmol/L      Potassium 3 8 mmol/L      Chloride 95 (L) mmol/L      CO2 27 mmol/L      ANION GAP 13 mmol/L      BUN 19 mg/dL      Creatinine 1 20 mg/dL      Glucose 104 mg/dL      Calcium 9 5 mg/dL      AST 26 U/L      ALT 66 U/L      Alkaline Phosphatase 83 U/L      Total Protein 7 9 g/dL      Albumin 3 3 (L) g/dL      Total Bilirubin 1 40 (H) mg/dL      eGFR 69 ml/min/1 73sq m     Narrative:         National Kidney Disease Education Program recommendations are as follows:  GFR calculation is accurate only with a steady state creatinine  Chronic Kidney disease less than 60 ml/min/1 73 sq  meters  Kidney failure less than 15 ml/min/1 73 sq  meters      Uric acid [[id number]]  (Abnormal) Collected:  02/03/19 2151    Lab Status:  Final result Specimen:  Blood from Arm, Left Updated:  02/03/19 2219     Uric Acid 8 6 (H) mg/dL     CBC and differential [[id number]]  (Abnormal) Collected:  02/03/19 2150    Lab Status:  Final result Specimen:  Blood from Arm, Left Updated:  02/03/19 2205     WBC 10 60 (H) Thousand/uL      RBC 5 11 Million/uL      Hemoglobin 15 9 g/dL      Hematocrit 46 1 %      MCV 90 fL      MCH 31 1 pg      MCHC 34 5 g/dL      RDW 12 2 %      MPV 9 5 fL      Platelets 757 Thousands/uL      nRBC 0 /100 WBCs      Neutrophils Relative 74 %      Immat GRANS % 1 %      Lymphocytes Relative 11 (L) %      Monocytes Relative 14 (H) %      Eosinophils Relative 0 %      Basophils Relative 0 %      Neutrophils Absolute 7 95 (H) Thousands/µL      Immature Grans Absolute 0 05 Thousand/uL      Lymphocytes Absolute 1 11 Thousands/µL      Monocytes Absolute 1 45 (H) Thousand/µL      Eosinophils Absolute 0 03 Thousand/µL      Basophils Absolute 0 01 Thousands/µL                  No orders to display              Procedures  Procedures       Phone Contacts  ED Phone Contact    ED Course                               MDM    Disposition  Final diagnoses:   Generalized gouty arthritis   Ambulatory dysfunction     Time reflects when diagnosis was documented in both MDM as applicable and the Disposition within this note     Time User Action Codes Description Comment    2/3/2019 11:10 PM Mervat RUBALCAVA Add [M10 9] Generalized gouty arthritis     2/3/2019 11:10 PM Rudy Gonzalez Add [R26 2] Ambulatory dysfunction       ED Disposition     ED Disposition Condition Date/Time Comment    Admit  Sun Feb 3, 2019 11:10 PM Case was discussed with Easton pena and the patient's admission status was agreed to be Admission Status: observation status to the service of Dr Easton Adams           Follow-up Information    None         Current Discharge Medication List      CONTINUE these medications which have NOT CHANGED    Details   acetaminophen (TYLENOL) 500 mg tablet Take 2 tablets by mouth every 6 (six) hours as needed for mild pain  Qty: 30 tablet, Refills: 0      allopurinol (ZYLOPRIM) 300 mg tablet TAKE ONE TABLET BY MOUTH TWICE A DAY  Qty: 60 tablet, Refills: 3    Associated Diagnoses: Gout, unspecified cause, unspecified chronicity, unspecified site      amLODIPine (NORVASC) 2 5 mg tablet TAKE ONE TABLET BY MOUTH DAILY AS DIRECTED  Qty: 90 tablet, Refills: 8    Associated Diagnoses: Benign essential hypertension      colchicine (COLCRYS) 0 6 mg tablet Take 1 tablet (0 6 mg total) by mouth 3 (three) times a day  Qty: 3 tablet, Refills: 0    Associated Diagnoses: Gout attack      fenofibrate (TRIGLIDE) 160 MG tablet TAKE ONE TABLET BY MOUTH DAILY  Qty: 30 tablet, Refills: 3    Associated Diagnoses: Hyperlipidemia, unspecified hyperlipidemia type      indomethacin (INDOCIN) 25 mg capsule Take 1 capsule (25 mg total) by mouth 3 (three) times a day  Qty: 90 capsule, Refills: 0    Associated Diagnoses: Gout, unspecified cause, unspecified chronicity, unspecified site      lisinopril (ZESTRIL) 30 mg tablet TAKE ONE TABLET BY MOUTH DAILY ( do not TAKE LISINOPRIL/HCTZ 20/25)  Qty: 90 tablet, Refills: 5    Associated Diagnoses: Benign essential hypertension      lovastatin (MEVACOR) 40 MG tablet TAKE ONE TABLET BY MOUTH AT BEDTIME  Qty: 90 tablet, Refills: 5    Associated Diagnoses: Hyperlipidemia, unspecified hyperlipidemia type      omeprazole (PriLOSEC) 20 mg delayed release capsule TAKE TWO CAPSULES BY MOUTH DAILY  Qty: 60 capsule, Refills: 1    Associated Diagnoses: Gastric ulcer without hemorrhage or perforation           No discharge procedures on file      ED Provider  Electronically Signed by           Nerissa Yao MD  02/04/19 0791

## 2019-02-04 NOTE — DISCHARGE SUMMARY
Discharge Summary - [address] [facility]    Patient Information: Kiersten Green 47 y o  male MRN: [id number]  Unit/Bed#: -01 Encounter: [id number]    Discharging Physician / Practitioner: Leonarda Garg MD  PCP: Leonel Zapien DO  Admission Date: 2/3/2019  Discharge Date: 02/04/19    Disposition:     Home    Reason for Admission: gout flare     Discharge Diagnoses:     Principal Problem:    Polyarthralgia  Active Problems:    Benign essential hypertension    Dyslipidemia    Gout    Morbid obesity ([address] )    Hyperbilirubinemia  Resolved Problems:    * No resolved hospital problems  *      Consultations During Hospital Stay:  · None     Procedures Performed:     Arthrocentesis: left knee   Results for Zeus Alexis (MRN [id number]) as of 2/4/2019 16:04   Ref  Range 2/3/2019 23:00   SYNOVIAL CRYSTALS Latest Ref Range: No Crystals Seen  Positive for Mono    Site Unknown Synovial   Color, Fluid Latest Ref Range: Clear, Colorless,Yellow  Yellow   Clarity, Fluid Latest Ref Range: Clear  Cloudy (A)   WBC, Fluid Latest Ref Range: 0 - 200 /ul 116,674 (H)   Neutrophils % (Fluid) Latest Units: % 92   Monocytes % (Fluid) Latest Units: % 8       Significant Findings / Test Results:     · Gram stain: no organisms     Incidental Findings:   · none     Test Results Pending at Discharge (will require follow up):   · none     Outpatient Tests Requested:  · None     Complications:  None     Hospital Course:     Kiersten Green is a 47 y o  male patient history of gout previously controlled with NSAIDs and allopurinol who originally presented to the hospital on 2/3/2019 due to poly arthralgias  The last few days patient has had pain is right ankle right knee right elbow and now left knee  Pain in left knee was so severe the patient had difficulty ambulating  In the ED patient had arthrocentesis of the left knee which showed cloudy fluid with greater than 100,000 white cells    It is positive for monosodium urate crystals  Patient felt like her symptoms were consistent with prior gout flares  There no s/s of systemic illness or septic joint, so empiric abx were not given  He has been told to not take his NSAIDs due to a recent surgery and colchicine was ineffective in relieving his symptoms  Have elected to treat with short course of prednisone to see if symptoms can be alleviated  Also provided prescription for oxycodone for pain control  Patient was seen and evaluated by PT who recommended outpatient PT  Patient was prescribed with rolling walker to assist with ambulation until his symptoms resolve  Condition at Discharge: fair     Discharge Day Visit / Exam:     Subjective:  Feels okay  No pain at rest, but worse with ambulation  Improved ambulation with RW  Vitals: Blood Pressure: 122/79 (02/04/19 0749)  Pulse: 76 (02/04/19 0749)  Temperature: 98 2 °F (36 8 °C) (02/04/19 0749)  Temp Source: Oral (02/04/19 0749)  Respirations: 18 (02/04/19 0749)  Height: 5' 8" (172 7 cm) (02/03/19 2357)  Weight - Scale: 115 kg (254 lb 6 6 oz) (02/03/19 2357)  SpO2: 97 % (02/04/19 0749)  Exam:   Physical Exam   Constitutional: He is oriented to person, place, and time  No distress  Non toxic appearing    HENT:   Head: Normocephalic and atraumatic  Cardiovascular: Normal rate and regular rhythm  Pulmonary/Chest: Effort normal and breath sounds normal    Abdominal: Soft  Bowel sounds are normal  He exhibits distension  There is no tenderness  Abdominal scars intact    Musculoskeletal: He exhibits tenderness  Left knee joint is tender; no effusion; minimal erythema    Neurological: He is alert and oriented to person, place, and time  Skin: Skin is warm and dry  No rash noted  He is not diaphoretic  No erythema  Psychiatric: He has a normal mood and affect  His behavior is normal    Nursing note and vitals reviewed      Discharge instructions/Information to patient and family:   See after visit summary for information provided to patient and family  Provisions for Follow-Up Care:  See after visit summary for information related to follow-up care and any pertinent home health orders  Planned Readmission: none      Discharge Statement:  I spent 35 minutes discharging the patient  This time was spent on the day of discharge  I had direct contact with the patient on the day of discharge  Greater than 50% of the total time was spent examining patient, answering all patient questions, arranging and discussing plan of care with patient as well as directly providing post-discharge instructions  Additional time then spent on discharge activities  Discharge Medications:  See after visit summary for reconciled discharge medications provided to patient and family        ** Please Note: This note has been constructed using a voice recognition system **

## 2019-02-04 NOTE — PLAN OF CARE
Problem: Potential for Falls  Goal: Patient will remain free of falls  INTERVENTIONS:  - Assess patient frequently for physical needs  -  Identify cognitive and physical deficits and behaviors that affect risk of falls    -  House fall precautions as indicated by assessment   - Educate patient/family on patient safety including physical limitations  - Instruct patient to call for assistance with activity based on assessment  - Modify environment to reduce risk of injury  - Consider OT/PT consult to assist with strengthening/mobility   Outcome: Completed Date Met: 02/04/19      Problem: PAIN - ADULT  Goal: Verbalizes/displays adequate comfort level or baseline comfort level  Interventions:  - Encourage patient to monitor pain and request assistance  - Assess pain using appropriate pain scale  - Administer analgesics based on type and severity of pain and evaluate response  - Implement non-pharmacological measures as appropriate and evaluate response  - Consider cultural and social influences on pain and pain management  - Notify physician/advanced practitioner if interventions unsuccessful or patient reports new pain   Outcome: Completed Date Met: 02/04/19      Problem: DISCHARGE PLANNING - CARE MANAGEMENT  Goal: Discharge to post-acute care or home with appropriate resources  INTERVENTIONS:  - Conduct assessment to determine patient/family and health care team treatment goals, and need for post-acute services based on payer coverage, community resources, and patient preferences, and barriers to discharge  - Address psychosocial, clinical, and financial barriers to discharge as identified in assessment in conjunction with the patient/family and health care team  - Arrange appropriate level of post-acute services according to patients   needs and preference and payer coverage in collaboration with the physician and health care team  - Communicate with and update the patient/family, physician, and health care team regarding progress on the discharge plan  - Arrange appropriate transportation to post-acute venues   Outcome: Completed Date Met: 02/04/19

## 2019-02-04 NOTE — PHYSICAL THERAPY NOTE
PHYSICAL THERAPY EVALUATION  NAME:  Rico Jennings  DATE: 02/04/19    AGE:   47 y o    Mrn:   [id number]  ADMIT DX:  Gout [M10 9]  Generalized gouty arthritis [M10 9]  Ambulatory dysfunction [R26 2]    Past Medical History:   Diagnosis Date    Arthritis     GERD (gastroesophageal reflux disease)     Gout     Gout     Hypercholesteremia     Hypertension     Low back pain      Length Of Stay: 0  Performed at least 2 patient identifiers during session: Name and Birthday  PHYSICAL THERAPY EVALUATION :    02/04/19 5207   Note Type   Note type Eval only   Pain Assessment   Pain Assessment No/denies pain  (at rest, but reports substantial pain w/WB)   Pain Location Knee   Pain Orientation Left   Home Living   Type of [address] (14 steps @ home)   Home Equipment (no A device prior to admit, )   Prior Function   Lives With Spouse   ADL Assistance Independent   Falls in the last 6 months 0   Restrictions/Precautions   Weight Bearing Precautions Per Order No   General   Additional Pertinent History activity up with assistance   Family/Caregiver Present Yes  (spouse present @ end of session)   Cognition   Overall Cognitive Status WFL   Arousal/Participation Alert   Orientation Level Oriented to person   Following Commands Follows one step commands with increased time or repetition   Comments Pt declines having  on [employer] phone   RUE Strength   RUE Overall Strength Within Functional Limits - able to perform ADL tasks with strength   LUE Strength   LUE Overall Strength Within Functional Limits - able to perform ADL tasks with strength   Strength RLE   R Hip Flexion 4/5   R Knee Extension 4/5   R Ankle Dorsiflexion 4/5   LLE Overall PROM   L Knee Flexion limited   L Knee Extension limited   Strength LLE   L Hip Flexion (tested to 3)   L Knee Flexion (NT)   L Ankle Dorsiflexion (tested to 3)   Coordination   Movements are Fluid and Coordinated 0   Coordination and Movement Description antalgic   Sensation X  (vision and hearing)   Light Touch   RLE Light Touch Not tested   LLE Light Touch Not tested   Bed Mobility   Supine to Sit 5  Supervision   Additional items Assist x 1; Increased time required;Verbal cues   Sit to Supine 5  Supervision   Additional items Assist x 1; Increased time required;Verbal cues; Bedrails   Transfers   Sit to Stand 5  Supervision   Additional items Assist x 1; Increased time required;Armrests   Stand to Sit 5  Supervision   Additional items Assist x 1; Increased time required;Verbal cues   Ambulation/Elevation   Gait pattern Decreased L stance; Inconsistent michael; Excessively slow   Gait Assistance 5  Supervision   Additional items Assist x 1;Verbal cues   Assistive Device Rolling walker   Distance 20'   Stair Management Assistance Not tested  (Pt reports planning to go up/down on buttocks)   Balance   Static Sitting Good   Static Standing Fair   Ambulatory Fair -   Endurance Deficit   Endurance Deficit Yes   Endurance Deficit Description limtied amb distances   Activity Tolerance   Activity Tolerance Patient limited by pain; Patient limited by fatigue   Nurse Made Aware spoke to [facility]   Assessment   Prognosis Fair   Problem List Decreased strength;Decreased range of motion;Decreased endurance;Decreased mobility;Obesity;Pain  (gait deviations)   Barriers to Discharge (stairs at home)   Goals   Patient Goals to have less pain with walking   STG Expiration Date 02/11/19   Short Term Goal #1 if not DCed   Treatment Day 0   Plan   Treatment/Interventions Functional transfer training;LE strengthening/ROM; Therapeutic exercise; Endurance training;Cognitive reorientation;Patient/family training;Equipment eval/education; Bed mobility;Gait training;Spoke to nursing   PT Frequency 5x/wk   Recommendation   Recommendation Home with family support   Equipment Recommended Walker   Barthel Index   Feeding 10   Bathing 0   Grooming Score 5   Dressing Score 5   Bladder Score 10   Bowels Score 10   Toilet Use Score 5   Transfers (Bed/Chair) Score 10   Mobility (Level Surface) Score 0   Stairs Score 0   Barthel Index Score 55   (Please find full objective findings from PT assessment regarding body systems outlined above)  Assessment: Pt is a 47 y o  male seen for PT evaluation s/p OBS to [facility] on 2/3/2019 w/ Polyarthralgia  Order placed for PT  Upon evaluation: Pt requires no physical assistance for bed mobility, transfers, and ambulation with rolling walker  Pt's clinical presentation is currently unstable/unpredictable given the functional mobility deficits above, especially weakness, decreased ROM, pain, decreased activity tolerance and decreased functional mobility tolerance, coupled with fall risks including impaired balance and reported decreased sensation, and combined with medical complications of multiple readmissions to ED, hx of non compliance  Pt to benefit from continued skilled PT tx while in hospital and upon DC to address deficits as defined above and maximize level of functional mobility  From PT/mobility standpoint, recommendation at time of d/c would be home with family support and with rolling walker pending progress in order to maximize pt's functional independence and consistency w/ mobility in order to facilitate return to PLOF  Recommend trials on stairs next session if pt not DCed  Goals: Pt will:  Perform transfers to modified I to increase Indep in home environment and improve ease of transfers  Perform ambulation with least restrictive device for >50' to  Supervision  to increase Indep in home environment and less pain during amb trial, more normalized gait  Perform stairs w/1 rail to return to [facility] home and decrease risk for falls which not performing stairs on buttocks  Comorbidities affecting pt's physical performance at time of assessment include: HTN and arthritis, recent gastric banding   Personal factors affecting pt at time of IE include: hx of non-compliance, steps to enter environment, multi-level environment, inability to perform IADLs, inability to navigate community distances and preferred language not [place] (language barrier)       Dean Mayen, PT, DPT

## 2019-02-04 NOTE — SOCIAL WORK
Observation notice reviewed  Copy provided to patient  Copy placed in medical records  Patient was seen by PT today and verbal recommendation is currently outpatient PT per Irma Stokes  CM reviewed information with [facility] and [facility]  CM provided patient a copy of the outpatient PT "blue List" for patient at bedside  CM reviewed with patient the need for a walker  Patient informed Cm that he would prefer walker from [employer]  CM obtained walker from [facility] supplies and provided patient with new walker  Patient signed young's medical form indicated that he received the item  Patient does not have any other needs at this time and will discharge later today pending medical clearance  CM will follow through discharge

## 2019-02-04 NOTE — PLAN OF CARE
Problem: PHYSICAL THERAPY ADULT  Goal: Performs mobility at highest level of function for planned discharge setting  See evaluation for individualized goals  Treatment/Interventions: Functional transfer training, LE strengthening/ROM, Therapeutic exercise, Endurance training, Cognitive reorientation, Patient/family training, Equipment eval/education, Bed mobility, Gait training, Spoke to nursing  Equipment Recommended: Sandee Thayer       See flowsheet documentation for full assessment, interventions and recommendations  Prognosis: Fair  Problem List: Decreased strength, Decreased range of motion, Decreased endurance, Decreased mobility, Obesity, Pain (gait deviations)  Assessment: Pt is a 47 y o  male seen for PT evaluation s/p OBS to [facility] on 2/3/2019 w/ Polyarthralgia  Order placed for PT  Upon evaluation: Pt requires no physical assistance for bed mobility, transfers, and ambulation with rolling walker  Pt's clinical presentation is currently unstable/unpredictable given the functional mobility deficits above, especially weakness, decreased ROM, pain, decreased activity tolerance and decreased functional mobility tolerance, coupled with fall risks including impaired balance and reported decreased sensation, and combined with medical complications of multiple readmissions to ED, hx of non compliance  Pt to benefit from continued skilled PT tx while in hospital and upon DC to address deficits as defined above and maximize level of functional mobility  From PT/mobility standpoint, recommendation at time of d/c would be home with family support and with rolling walker pending progress in order to maximize pt's functional independence and consistency w/ mobility in order to facilitate return to PLOF  Recommend trials on stairs next session if pt not DCed  Barriers to Discharge:  (stairs at home)     Recommendation: Home with family support          See flowsheet documentation for full assessment

## 2019-02-07 LAB
BACTERIA SPEC BFLD CULT: NO GROWTH
GRAM STN SPEC: NORMAL
GRAM STN SPEC: NORMAL

## 2019-02-17 ENCOUNTER — HOSPITAL ENCOUNTER (EMERGENCY)
Facility: HOSPITAL | Age: 54
Discharge: HOME | End: 2019-02-17
Attending: EMERGENCY MEDICINE
Payer: COMMERCIAL

## 2019-02-17 VITALS
HEART RATE: 99 BPM | DIASTOLIC BLOOD PRESSURE: 70 MMHG | RESPIRATION RATE: 20 BRPM | SYSTOLIC BLOOD PRESSURE: 135 MMHG | OXYGEN SATURATION: 99 % | WEIGHT: 241 LBS | BODY MASS INDEX: 36.64 KG/M2 | TEMPERATURE: 99.9 F

## 2019-02-17 DIAGNOSIS — M10.9 GOUT: Primary | ICD-10-CM

## 2019-02-17 PROCEDURE — 96372 THER/PROPH/DIAG INJ SC/IM: CPT

## 2019-02-17 PROCEDURE — 99283 EMERGENCY DEPT VISIT LOW MDM: CPT

## 2019-02-17 RX ORDER — PREDNISONE 20 MG/1
60 TABLET ORAL DAILY
Qty: 15 TABLET | Refills: 0 | Status: SHIPPED | OUTPATIENT
Start: 2019-02-17 | End: 2019-02-22

## 2019-02-17 RX ORDER — HYDROMORPHONE HCL/PF 1 MG/ML
1 SYRINGE (ML) INJECTION ONCE
Status: COMPLETED | OUTPATIENT
Start: 2019-02-17 | End: 2019-02-17

## 2019-02-17 RX ORDER — OXYCODONE HYDROCHLORIDE AND ACETAMINOPHEN 5; 325 MG/1; MG/1
1 TABLET ORAL EVERY 4 HOURS PRN
Qty: 15 TABLET | Refills: 0 | Status: SHIPPED | OUTPATIENT
Start: 2019-02-17 | End: 2019-12-04

## 2019-02-17 RX ADMIN — HYDROMORPHONE HYDROCHLORIDE 1 MG: 1 INJECTION, SOLUTION INTRAMUSCULAR; INTRAVENOUS; SUBCUTANEOUS at 17:19

## 2019-02-17 NOTE — ED PROVIDER NOTES
History  Chief Complaint   Patient presents with    Pain     pt has history of gout with a flareup at this time, unable to take alot of pain meds, due to S/P weight loss surgery(sleeve) 14 days ago     52-year-old male presents with chief complaint of right ankle and left foot pain  Patient reports he has a history of gout and has had multiple flares recently  Patient states he is unable to take anti-inflammatory medications specifically NSAIDs secondary to having gastric sleeve surgery in the recent past   Patient states colchicine is been ineffective for him in the past   Patient had a recent admission to our hospital for the same symptoms  Patient was treated with prednisone and oxycodone/acetaminophen successfully  No fevers or chills  No significant joint swelling  Mild edema to the right ankle  History provided by:  Patient   used: No    Medical Problem   Location:  Right ankle, other joints  Quality:  Sharp, stabbing, needle like pain  Severity:  Severe  Onset quality:  Gradual  Duration:  3 days  Timing:  Constant  Progression:  Unchanged  Chronicity:  Recurrent  Context:  Hx of gout  Worsened by: Movement, palpation  Ineffective treatments:  Nsaids, colchicine  Associated symptoms: no fever and no nausea        Prior to Admission Medications   Prescriptions Last Dose Informant Patient Reported?  Taking?   acetaminophen (TYLENOL) 500 mg tablet  Self No No   Sig: Take 2 tablets by mouth every 6 (six) hours as needed for mild pain   Patient not taking: Reported on 10/3/2018    allopurinol (ZYLOPRIM) 300 mg tablet  Self No No   Sig: TAKE ONE TABLET BY MOUTH TWICE A DAY   amLODIPine (NORVASC) 2 5 mg tablet  Self No No   Sig: TAKE ONE TABLET BY MOUTH DAILY AS DIRECTED   fenofibrate (TRIGLIDE) 160 MG tablet   No No   Sig: TAKE ONE TABLET BY MOUTH DAILY   lisinopril (ZESTRIL) 30 mg tablet  Self No No   Sig: TAKE ONE TABLET BY MOUTH DAILY ( do not TAKE LISINOPRIL/HCTZ 20/25) lovastatin (MEVACOR) 40 MG tablet   No No   Sig: TAKE ONE TABLET BY MOUTH AT BEDTIME   omeprazole (PriLOSEC) 20 mg delayed release capsule   No No   Sig: TAKE TWO CAPSULES BY MOUTH DAILY      Facility-Administered Medications: None       Past Medical History:   Diagnosis Date    Arthritis     GERD (gastroesophageal reflux disease)     Gout     Gout     Hypercholesteremia     Hypertension     Low back pain        Past Surgical History:   Procedure Laterality Date    COLONOSCOPY      LAPAROSCOPIC GASTRIC BANDING      OR ESOPHAGOGASTRODUODENOSCOPY TRANSORAL DIAGNOSTIC N/A 7/10/2018    Procedure: EGD AND COLONOSCOPY;  Surgeon: Wenceslao Disla MD;  Location: [facility]; Service: Gastroenterology       Family History   Problem Relation Age of Onset    Diabetes Mother     Seizures Mother     No Known Problems Father     No Known Problems Family      I have reviewed and agree with the history as documented  Social History     Tobacco Use    Smoking status: Never Smoker    Smokeless tobacco: Never Used   Substance Use Topics    Alcohol use: Yes     Comment: occasionally / social     Drug use: No        Review of Systems   Constitutional: Negative for chills and fever  Gastrointestinal: Negative for nausea  Musculoskeletal: Positive for arthralgias (right ankle, left foot, right elbow)  Skin: Negative for pallor and wound  Physical Exam  Physical Exam   Constitutional: He is oriented to person, place, and time  He appears well-developed and well-nourished  He appears distressed  HENT:   Head: Normocephalic and atraumatic  Eyes: Pupils are equal, round, and reactive to light  EOM are normal    Neck: Normal range of motion  No JVD present  Cardiovascular: Normal rate, regular rhythm, normal heart sounds and intact distal pulses  Exam reveals no gallop and no friction rub  No murmur heard  Pulmonary/Chest: Effort normal and breath sounds normal  No respiratory distress   He has no wheezes  He has no rales  He exhibits no tenderness  Musculoskeletal: Normal range of motion  He exhibits edema (mild edema right ankle)  He exhibits no tenderness  Neurological: He is alert and oriented to person, place, and time  Skin: Skin is warm and dry  There is erythema (mild erythema right ankle)  Psychiatric: He has a normal mood and affect  His behavior is normal  Judgment and thought content normal    Nursing note and vitals reviewed        Vital Signs  ED Triage Vitals [02/17/19 1618]   Temperature Pulse Respirations Blood Pressure SpO2   99 9 °F (37 7 °C) 99 20 135/70 99 %      Temp Source Heart Rate Source Patient Position - Orthostatic VS BP Location FiO2 (%)   Oral -- -- -- --      Pain Score       Worst Possible Pain           Vitals:    02/17/19 1618   BP: 135/70   Pulse: 99       Visual Acuity      ED Medications  Medications   HYDROmorphone (DILAUDID) injection 1 mg (has no administration in time range)       Diagnostic Studies  Results Reviewed     None                 No orders to display              Procedures  Procedures       Phone Contacts  ED Phone Contact    ED Course                               MDM  Number of Diagnoses or Management Options  Gout: new and does not require workup  Diagnosis management comments: Background: 47 y o  male presents with s/s gouty arthritis    Plan: treat with steroids and analgesics at home, referral to rheumatology      Risk of Complications, Morbidity, and/or Mortality  Management options: high    Patient Progress  Patient progress: stable      Disposition  Final diagnoses:   Gout     Time reflects when diagnosis was documented in both MDM as applicable and the Disposition within this note     Time User Action Codes Description Comment    2/17/2019  5:05 PM Henrietta Buerger Add [M10 9] Gout       ED Disposition     ED Disposition Condition Date/Time Comment    Discharge Stable Sun Feb 17, 2019  5:05 PM Analisa discharge to home/self care             Follow-up Information     Follow up With Specialties Details Why Contact Info Additional [facility] Schedule an appointment as soon as possible for a visit   [address] [facility] [address] [facility], [address] [place] [address], [phone]          Patient's Medications   Discharge Prescriptions    OXYCODONE-ACETAMINOPHEN (PERCOCET) 5-325 MG PER TABLET    Take 1 tablet by mouth every 4 (four) hours as needed for moderate pain for up to 15 dosesMax Daily Amount: 6 tablets       Start Date: 2/17/2019 End Date: --       Order Dose: 1 tablet       Quantity: 15 tablet    Refills: 0    PREDNISONE 20 MG TABLET    Take 3 tablets (60 mg total) by mouth daily for 5 days       Start Date: 2/17/2019 End Date: 2/22/2019       Order Dose: 60 mg       Quantity: 15 tablet    Refills: 0     No discharge procedures on file      ED Provider  Electronically Signed by           Marcelle Bueno MD  02/17/19 4855

## 2019-02-17 NOTE — ED NOTES
Pt with gout flare up to lower extremities in particular left knee and right foot  Call bell in reach  Awaiting MD for initial eval  Family bedside  Pt is A+ox4   resp unlabored     Luis M Krishna RN  02/17/19 [id number]

## 2019-04-11 DIAGNOSIS — E78.5 HYPERLIPIDEMIA, UNSPECIFIED HYPERLIPIDEMIA TYPE: ICD-10-CM

## 2019-04-16 RX ORDER — FENOFIBRATE 160 MG/1
TABLET ORAL
Qty: 30 TABLET | Refills: 3 | OUTPATIENT
Start: 2019-04-16

## 2019-07-08 NOTE — PROGRESS NOTES
Assessment/Plan:    Status post gastrectomy  78 lb weight loss since surg  gastric sleve jan 2019  C/o fatigue  On supplemental vitamins and has upcoming appointment    Gout  Currently on short prednisone course per ER yesterday  Also on allopurinal - only taking it daily  Tends to still drink a few glasses of wine - gave him a low purine diet  He is on a PPI daily  Defer tx to rheumatoid  Requesting note to get out of community service bc of pain - defer to rheumatoid  Impaired fasting glucose  Needs aic since weight loss    Benign essential hypertension  Stable on current regimen    Dyslipidemia  Not on statin/fenofibrate - needs lipid updated  I have spent 40 minutes with Patient and family today in which greater than 50% of this time was spent in counseling/coordination of care regarding Diagnostic results, Prognosis, Risks and benefits of tx options, Intructions for management, Patient and family education, Importance of tx compliance, Risk factor reductions and Impressions  Diagnoses and all orders for this visit:    Impaired fasting glucose  -     Comprehensive metabolic panel; Future  -     Hemoglobin A1C; Future  -     Lipid panel; Future  -     TSH, 3rd generation with Free T4 reflex; Future  -     Uric acid; Future    Benign essential hypertension  -     amLODIPine (NORVASC) 2 5 mg tablet; Take 1 tablet (2 5 mg total) by mouth daily As directed  -     lisinopril (ZESTRIL) 30 mg tablet; Take 1 tablet (30 mg total) by mouth daily  -     Comprehensive metabolic panel; Future  -     Hemoglobin A1C; Future  -     Lipid panel; Future  -     TSH, 3rd generation with Free T4 reflex; Future  -     Uric acid; Future    Dyslipidemia  -     Comprehensive metabolic panel; Future  -     Hemoglobin A1C; Future  -     Lipid panel; Future  -     TSH, 3rd generation with Free T4 reflex; Future  -     Uric acid;  Future    Gout, unspecified cause, unspecified chronicity, unspecified site  -     allopurinol (ZYLOPRIM) 300 mg tablet; Take 1 tablet (300 mg total) by mouth daily  -     Comprehensive metabolic panel; Future  -     Hemoglobin A1C; Future  -     Lipid panel; Future  -     TSH, 3rd generation with Free T4 reflex; Future  -     Uric acid; Future    Obesity (BMI 30 0-34 9)  -     Comprehensive metabolic panel; Future  -     Hemoglobin A1C; Future  -     Lipid panel; Future  -     TSH, 3rd generation with Free T4 reflex; Future  -     Uric acid; Future    Status post gastrectomy          Subjective: Patient is here for follow up     Health Maintenance Due   Topic Date Due    BMI: Followup Plan  10[birth year]1984      Patient ID: Ben Haskins is a 47 y o  male  HPI  Pt new to provider - here w/ a friend  emr perused  Labs reviewed - problem list updated  Pt has multiple co morbidities as noted  Has gout flare s/p ER visit [facility]( place on prednisone course) and poly arthralgia/ lumbar DJD/ chronic pain - inc pain/ difficulty standing for long periods -has appointment w/ rheumatoid specialist tomorrow  A little better on prednisone  Also had recent gastric sleve in Jan 2019 - does report taking 6 vitamens  Lost 78 lbs since surg  Surgeon is in [facility]  Complaining of fatigue  Has appointment coming up  On ppi     Lives in [address]  and asking to be deferred from community service bc of arthralgia with a note  Defer this request to rheumatoid specialist as he is seeing specialist tomorrow; and he is new to me  Labs reviewed  No other complaints  The following portions of the patient's history were reviewed and updated as appropriate: allergies, past social history, past surgical history and problem list     Review of Systems   Constitutional: Positive for fatigue  Negative for activity change, appetite change, chills, diaphoresis, fever and unexpected weight change  HENT: Negative  Eyes: Negative  Respiratory: Negative  Cardiovascular: Negative  Negative for chest pain  Gastrointestinal: Negative  Endocrine: Negative  Negative for cold intolerance  Genitourinary: Negative  Musculoskeletal: Positive for arthralgias and joint swelling  Skin: Negative  Allergic/Immunologic: Negative  Neurological: Negative  Hematological: Negative  Psychiatric/Behavioral: Negative  All other systems reviewed and are negative  Objective:    BMI Counseling: Body mass index is 30 87 kg/m²  Discussed the patient's BMI with him  The BMI is above average  BMI counseling and education was provided to the patient  Nutrition recommendations include reducing portion sizes, decreasing overall calorie intake, 3-5 servings of fruits/vegetables daily and reducing fast food intake  Exercise recommendations include exercising 3-5 times per week and strength training exercises  /76 (BP Location: Right arm, Patient Position: Sitting, Cuff Size: Standard)   Pulse 91   Temp 98 2 °F (36 8 °C) (Tympanic)   Resp 16   Ht 5' 8" (1 727 m)   Wt 92 1 kg (203 lb)   SpO2 99%   BMI 30 87 kg/m²          Physical Exam   Constitutional: He is oriented to person, place, and time  He appears well-developed and well-nourished  No distress  HENT:   Head: Normocephalic  Eyes: Conjunctivae are normal    Cardiovascular: Normal rate, regular rhythm and normal heart sounds  Pulmonary/Chest: Effort normal and breath sounds normal  No respiratory distress  He has no wheezes  He has no rales  Musculoskeletal: He exhibits tenderness and deformity (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

## 2019-07-10 ENCOUNTER — HOSPITAL ENCOUNTER (EMERGENCY)
Facility: HOSPITAL | Age: 54
Discharge: HOME | End: 2019-07-10
Attending: EMERGENCY MEDICINE | Admitting: EMERGENCY MEDICINE
Payer: COMMERCIAL

## 2019-07-10 VITALS
HEIGHT: 68 IN | HEART RATE: 98 BPM | TEMPERATURE: 99 F | OXYGEN SATURATION: 98 % | RESPIRATION RATE: 16 BRPM | DIASTOLIC BLOOD PRESSURE: 84 MMHG | BODY MASS INDEX: 30.62 KG/M2 | SYSTOLIC BLOOD PRESSURE: 141 MMHG | WEIGHT: 202 LBS

## 2019-07-10 DIAGNOSIS — M10.9 GOUT: ICD-10-CM

## 2019-07-10 DIAGNOSIS — M25.50 ARTHRALGIA, UNSPECIFIED JOINT: Primary | ICD-10-CM

## 2019-07-10 PROCEDURE — NC001 PR NO CHARGE: Performed by: EMERGENCY MEDICINE

## 2019-07-10 PROCEDURE — 99284 EMERGENCY DEPT VISIT MOD MDM: CPT | Performed by: EMERGENCY MEDICINE

## 2019-07-10 PROCEDURE — 99283 EMERGENCY DEPT VISIT LOW MDM: CPT

## 2019-07-10 RX ORDER — PREDNISONE 20 MG/1
60 TABLET ORAL ONCE
Status: COMPLETED | OUTPATIENT
Start: 2019-07-10 | End: 2019-07-10

## 2019-07-10 RX ORDER — PREDNISONE 20 MG/1
TABLET ORAL
Qty: 9 TABLET | Refills: 0 | Status: SHIPPED | OUTPATIENT
Start: 2019-07-10 | End: 2019-07-15

## 2019-07-10 RX ADMIN — PREDNISONE 60 MG: 20 TABLET ORAL at 16:09

## 2019-07-10 NOTE — ED ATTENDING ATTESTATION
Joey Padilla MD, saw and evaluated the patient  I have discussed the patient with the resident/non-physician practitioner and agree with the resident's/non-physician practitioner's findings, Plan of Care, and MDM as documented in the resident's/non-physician practitioner's note, except where noted  All available labs and Radiology studies were reviewed  I was present for key portions of any procedure(s) performed by the resident/non-physician practitioner and I was immediately available to provide assistance  At this point I agree with the current assessment done in the Emergency Department  I have conducted an independent evaluation of this patient a history and physical is as follows:    Patient is a 52-year-old male who presents to the emergency department relating that he has Christina Lotus in my joints    He relates that he has gout and arthritis and has been treated for these for many years  He notes that he has been taking allopurinol consistently for the last 2 to 3 years  He relates that every few months he experiences a flare of symptoms and has required drainage from his knees as well as required additional medication including in the past Percocet and prednisone  He does not have either of these medications presently and notes that his pain dramatically worsened yesterday on waking  He did note some areas of mild swelling and erythema of the feet which have resolved  He continues with soreness in multiple bilateral joints the body  He notes that he is unable to take NSAIDs as he underwent bariatric surgery-gastric sleeve  He notes that he has lost 78 lb in 5 months  He does express concern that this may have been too rapid of weight loss as he does not have his usual energy level  He notes that he does have an appointment to see his bariatric surgery office on Friday as well as an appointment to see his neurologist on Monday    He is hoping to require something to help alleviate his pain prior to the appointment  He denies having had any recent injections, procedures or fevers  No chest discomfort or shortness of breath  He is still able to range all joints though notes that it is uncomfortable to do so  On exam patient is pleasant with clear speech  He is able to move well though points to multiple areas including right elbow which has gouty tophi  He notes these areas have been uncomfortable for him  Vitals unremarkable  No swelling or discoloration is appreciated in the joints  Suspect flare of discomfort may be related to weather changes/arthritis  Without swelling or joint warmth doubt acute gouty flare  Will use short course of steroids to lessen symptoms  Patient will follow up as planned with his providers    Critical Care Time  Procedures

## 2019-07-10 NOTE — ED PROVIDER NOTES
History  Chief Complaint   Patient presents with    Back Pain     Gout pain in left sided joints and back; hx arthritis; has decreased energy  has had this for months-but reports increased pain today  patient reports having wt loss surgery appt monday and neurology appt monday but needs pain meds until that time comes  HPI    Prior to Admission Medications   Prescriptions Last Dose Informant Patient Reported? Taking?   acetaminophen (TYLENOL) 500 mg tablet  Self No Yes   Sig: Take 2 tablets by mouth every 6 (six) hours as needed for mild pain   allopurinol (ZYLOPRIM) 300 mg tablet  Self No Yes   Sig: TAKE ONE TABLET BY MOUTH TWICE A DAY   amLODIPine (NORVASC) 2 5 mg tablet  Self No Yes   Sig: TAKE ONE TABLET BY MOUTH DAILY AS DIRECTED   fenofibrate (TRIGLIDE) 160 MG tablet   No Yes   Sig: TAKE ONE TABLET BY MOUTH DAILY   lisinopril (ZESTRIL) 30 mg tablet  Self No Yes   Sig: TAKE ONE TABLET BY MOUTH DAILY ( do not TAKE LISINOPRIL/HCTZ 20/25)   lovastatin (MEVACOR) 40 MG tablet   No Yes   Sig: TAKE ONE TABLET BY MOUTH AT BEDTIME   omeprazole (PriLOSEC) 20 mg delayed release capsule   No Yes   Sig: TAKE TWO CAPSULES BY MOUTH DAILY   oxyCODONE-acetaminophen (PERCOCET) 5-325 mg per tablet   No Yes   Sig: Take 1 tablet by mouth every 4 (four) hours as needed for moderate pain for up to 15 dosesMax Daily Amount: 6 tablets      Facility-Administered Medications: None       Past Medical History:   Diagnosis Date    Arthritis     GERD (gastroesophageal reflux disease)     Gout     Gout     Hypercholesteremia     Hypertension     Low back pain        Past Surgical History:   Procedure Laterality Date    COLONOSCOPY      LAPAROSCOPIC GASTRIC BANDING      MA ESOPHAGOGASTRODUODENOSCOPY TRANSORAL DIAGNOSTIC N/A 7/10/2018    Procedure: EGD AND COLONOSCOPY;  Surgeon: Charles Patel MD;  [facility];   Service: Gastroenterology       Family History   Problem Relation Age of Onset    Diabetes Mother    Adia Dunham Seizures Mother     No Known Problems Father     No Known Problems Family      I have reviewed and agree with the history as documented  Social History     Tobacco Use    Smoking status: Never Smoker    Smokeless tobacco: Never Used   Substance Use Topics    Alcohol use: Yes     Comment: occasionally / social     Drug use: No        Review of Systems    Physical Exam  Physical Exam    Vital Signs  ED Triage Vitals [07/10/19 1505]   Temperature Pulse Respirations Blood Pressure SpO2   99 °F (37 2 °C) 98 16 141/84 98 %      Temp Source Heart Rate Source Patient Position - Orthostatic VS BP Location FiO2 (%)   Oral Monitor Sitting Left arm --      Pain Score       Worst Possible Pain           Vitals:    07/10/19 1505   BP: 141/84   Pulse: 98   Patient Position - Orthostatic VS: Sitting         Visual Acuity      ED Medications  Medications   predniSONE tablet 60 mg (60 mg Oral Given 7/10/19 1609)       Diagnostic Studies  Results Reviewed     None                 No orders to display              Procedures  Procedures       ED Course                               MDM    Disposition  Final diagnoses:   Arthralgia, unspecified joint   Gout     Time reflects when diagnosis was documented in both MDM as applicable and the Disposition within this note     Time User Action Codes Description Comment    7/10/2019  3:57 PM Amy Shells Add [M54 9,  G89 29] Chronic back pain, unspecified back location, unspecified back pain laterality     7/10/2019  3:58 PM Amy Shells Remove [M54 9,  G89 29] Chronic back pain, unspecified back location, unspecified back pain laterality     7/10/2019  3:58 PM Amy Shells Add [M25 50] Arthralgia, unspecified joint     7/10/2019  3:58 PM Amy Shells Add [M10 9] Gout       ED Disposition     ED Disposition Condition Date/Time Comment    Discharge Stable Wed Jul 10, 2019  3:57 PM Analisa discharge to home/self care              Follow-up Information     Follow up With Specialties Details Why Contact Info    Neurology and Weight Loss  Go in 5 days            Discharge Medication List as of 7/10/2019  4:03 PM      START taking these medications    Details   predniSONE 20 mg tablet Multiple Dosages:Starting Wed 7/10/2019, Last dose on Wed 7/10/2019, THEN Starting Thu 7/11/2019, Last dose on Fri 7/12/2019, THEN Starting Sat 7/13/2019, Last dose on Sun 7/14/2019Take 3 tablets (60 mg total) by mouth daily for 1 day, THEN 2 tablets  (40 mg total) daily for 2 days, THEN 1 tablet (20 mg total) daily for 2 days  , Print         CONTINUE these medications which have NOT CHANGED    Details   acetaminophen (TYLENOL) 500 mg tablet Take 2 tablets by mouth every 6 (six) hours as needed for mild pain, Starting Fri 12/1/2017, Print      allopurinol (ZYLOPRIM) 300 mg tablet TAKE ONE TABLET BY MOUTH TWICE A DAY, Normal      amLODIPine (NORVASC) 2 5 mg tablet TAKE ONE TABLET BY MOUTH DAILY AS DIRECTED, Normal      fenofibrate (TRIGLIDE) 160 MG tablet TAKE ONE TABLET BY MOUTH DAILY, Normal      lisinopril (ZESTRIL) 30 mg tablet TAKE ONE TABLET BY MOUTH DAILY ( do not TAKE LISINOPRIL/HCTZ 20/25), Normal      lovastatin (MEVACOR) 40 MG tablet TAKE ONE TABLET BY MOUTH AT BEDTIME, Normal      omeprazole (PriLOSEC) 20 mg delayed release capsule TAKE TWO CAPSULES BY MOUTH DAILY, Normal      oxyCODONE-acetaminophen (PERCOCET) 5-325 mg per tablet Take 1 tablet by mouth every 4 (four) hours as needed for moderate pain for up to 15 dosesMax Daily Amount: 6 tablets, Starting Sun 2/17/2019, Print           No discharge procedures on file      ED Provider  Electronically Signed by           Bala Mueller MD  07/10/19 6319

## 2019-07-10 NOTE — ED PROVIDER NOTES
History  Chief Complaint   Patient presents with    Back Pain     Gout pain in left sided joints and back; hx arthritis; has decreased energy  has had this for months-but reports increased pain today  patient reports having wt loss surgery appt monday and neurology appt monday but needs pain meds until that time comes  Claritza Shaw is a 48 yo m who present to the ED for joint pains  Endorses a history of gout for which he is receiving treatment  Patient recently underwent elective bariatric surgery resulting in 76 lb of weight loss  He is scheduled to see weight loss and Neurology on Monday  States he has been experiencing low energy for the past several months with increased pain today  When asked location of pain he points to multiple large joints including the shoulder, elbow, wrist, ankle  Denies any fever or loss of sensation  Past medical history of arthritis, gout, hyperlipidemia, hypertension, GERD, and low back pain  States he has been taking his allopurinol chronically but has not found it to be helpful  He has received steroids in the past which have helped his acute gout episodes  He is seeking pain medication to bridge him until he can make his up coming appointments  Prior to Admission Medications   Prescriptions Last Dose Informant Patient Reported?  Taking?   acetaminophen (TYLENOL) 500 mg tablet  Self No Yes   Sig: Take 2 tablets by mouth every 6 (six) hours as needed for mild pain   allopurinol (ZYLOPRIM) 300 mg tablet  Self No Yes   Sig: TAKE ONE TABLET BY MOUTH TWICE A DAY   amLODIPine (NORVASC) 2 5 mg tablet  Self No Yes   Sig: TAKE ONE TABLET BY MOUTH DAILY AS DIRECTED   fenofibrate (TRIGLIDE) 160 MG tablet   No Yes   Sig: TAKE ONE TABLET BY MOUTH DAILY   lisinopril (ZESTRIL) 30 mg tablet  Self No Yes   Sig: TAKE ONE TABLET BY MOUTH DAILY ( do not TAKE LISINOPRIL/HCTZ 20/25)   lovastatin (MEVACOR) 40 MG tablet   No Yes   Sig: TAKE ONE TABLET BY MOUTH AT BEDTIME omeprazole (PriLOSEC) 20 mg delayed release capsule   No Yes   Sig: TAKE TWO CAPSULES BY MOUTH DAILY   oxyCODONE-acetaminophen (PERCOCET) 5-325 mg per tablet   No Yes   Sig: Take 1 tablet by mouth every 4 (four) hours as needed for moderate pain for up to 15 dosesMax Daily Amount: 6 tablets      Facility-Administered Medications: None       Past Medical History:   Diagnosis Date    Arthritis     GERD (gastroesophageal reflux disease)     Gout     Gout     Hypercholesteremia     Hypertension     Low back pain        Past Surgical History:   Procedure Laterality Date    COLONOSCOPY      LAPAROSCOPIC GASTRIC BANDING      [place] ESOPHAGOGASTRODUODENOSCOPY TRANSORAL DIAGNOSTIC N/A 7/10/2018    Procedure: EGD AND COLONOSCOPY;  Surgeon: Mau Iqbal MD;  [facility]; Service: Gastroenterology       Family History   Problem Relation Age of Onset    Diabetes Mother     Seizures Mother     No Known Problems Father     No Known Problems Family      I have reviewed and agree with the history as documented  Social History     Tobacco Use    Smoking status: Never Smoker    Smokeless tobacco: Never Used   Substance Use Topics    Alcohol use: Yes     Comment: occasionally / social     Drug use: No        Review of Systems   Constitutional: Negative for fever  Musculoskeletal: Positive for arthralgias and back pain  All other systems reviewed and are negative  Physical Exam  ED Triage Vitals [07/10/19 1505]   Temperature Pulse Respirations Blood Pressure SpO2   99 °F (37 2 °C) 98 16 141/84 98 %      Temp Source Heart Rate Source Patient Position - Orthostatic VS BP Location FiO2 (%)   Oral Monitor Sitting Left arm --      Pain Score       Worst Possible Pain             Orthostatic Vital Signs  Vitals:    07/10/19 1505   BP: 141/84   Pulse: 98   Patient Position - Orthostatic VS: Sitting       Physical Exam   Constitutional: He is oriented to person, place, and time   He appears well-developed and well-nourished  No distress  Cardiovascular: Normal rate, regular rhythm and normal heart sounds  Exam reveals no gallop and no friction rub  No murmur heard  Pulmonary/Chest: Effort normal and breath sounds normal  No stridor  He has no wheezes  Musculoskeletal: He exhibits tenderness  He exhibits no edema  Decreased ROM at multiple joints 2/2 to pain  No swelling, edema, or erythema noted  Neurological: He is alert and oriented to person, place, and time  No sensory deficit  Skin: He is not diaphoretic  No erythema  Psychiatric: He has a normal mood and affect  His behavior is normal  Judgment and thought content normal        ED Medications  Medications   predniSONE tablet 60 mg (60 mg Oral Given 7/10/19 1563)       Diagnostic Studies  Results Reviewed     None                 No orders to display         Procedures  Procedures      MDM  Number of Diagnoses or Management Options  Arthralgia, unspecified joint:   Gout:   Diagnosis management comments: Pt has received benefit from prednisone in the past following similar episodes  Physical Exam does not suggest an acute gout attack  It is likely that most of his pain stems from chronic arthritis and the recent changes in his weight 2/2 to recent bariatric surgery  He was prescribed a taper of prednisone over the next 5 days to help inflammation and pain  Pt instructed to attend his appointments as originally planned  Transport was arranged to help him back to his vehicle        Disposition  Final diagnoses:   Arthralgia, unspecified joint   Gout     Time reflects when diagnosis was documented in both MDM as applicable and the Disposition within this note     Time User Action Codes Description Comment    7/10/2019  3:57 PM Amy Shells Add [M54 9,  G89 29] Chronic back pain, unspecified back location, unspecified back pain laterality     7/10/2019  3:58 PM Amy Shells Remove [M54 9,  G89 29] Chronic back pain, unspecified back location, unspecified back pain laterality     7/10/2019  3:58 PM Melodymargarita Andreas RODRIGUEZ Add [M25 50] Arthralgia, unspecified joint     7/10/2019  3:58 PM Angelo Sagastume Add [M10 9] Gout       ED Disposition     ED Disposition Condition Date/Time Comment    Discharge Stable Wed Jul 10, 2019  3:57 PM Analisa discharge to home/self care  Follow-up Information     Follow up With Specialties Details Why Contact Info    Neurology and Weight Loss  Go in 5 days            Discharge Medication List as of 7/10/2019  4:03 PM      START taking these medications    Details   predniSONE 20 mg tablet Multiple Dosages:Starting Wed 7/10/2019, Last dose on Wed 7/10/2019, THEN Starting Thu 7/11/2019, Last dose on Fri 7/12/2019, THEN Starting Sat 7/13/2019, Last dose on Sun 7/14/2019Take 3 tablets (60 mg total) by mouth daily for 1 day, THEN 2 tablets  (40 mg total) daily for 2 days, THEN 1 tablet (20 mg total) daily for 2 days  , Print         CONTINUE these medications which have NOT CHANGED    Details   acetaminophen (TYLENOL) 500 mg tablet Take 2 tablets by mouth every 6 (six) hours as needed for mild pain, Starting Fri 12/1/2017, Print      allopurinol (ZYLOPRIM) 300 mg tablet TAKE ONE TABLET BY MOUTH TWICE A DAY, Normal      amLODIPine (NORVASC) 2 5 mg tablet TAKE ONE TABLET BY MOUTH DAILY AS DIRECTED, Normal      fenofibrate (TRIGLIDE) 160 MG tablet TAKE ONE TABLET BY MOUTH DAILY, Normal      lisinopril (ZESTRIL) 30 mg tablet TAKE ONE TABLET BY MOUTH DAILY ( do not TAKE LISINOPRIL/HCTZ 20/25), Normal      lovastatin (MEVACOR) 40 MG tablet TAKE ONE TABLET BY MOUTH AT BEDTIME, Normal      omeprazole (PriLOSEC) 20 mg delayed release capsule TAKE TWO CAPSULES BY MOUTH DAILY, Normal      oxyCODONE-acetaminophen (PERCOCET) 5-325 mg per tablet Take 1 tablet by mouth every 4 (four) hours as needed for moderate pain for up to 15 dosesMax Daily Amount: 6 tablets, Starting Sun 2/17/2019, Print           No discharge procedures on file     ED Provider  Attending physically available and evaluated Analisa LAY managed the patient along with the ED Attending      Electronically Signed by         Arnold Lebron MD  07/10/19 1401

## 2019-07-11 ENCOUNTER — OFFICE VISIT (OUTPATIENT)
Dept: FAMILY MEDICINE CLINIC | Facility: CLINIC | Age: 54
End: 2019-07-11
Payer: COMMERCIAL

## 2019-07-11 VITALS
DIASTOLIC BLOOD PRESSURE: 76 MMHG | WEIGHT: 203 LBS | BODY MASS INDEX: 30.77 KG/M2 | SYSTOLIC BLOOD PRESSURE: 132 MMHG | OXYGEN SATURATION: 99 % | HEART RATE: 91 BPM | HEIGHT: 68 IN | TEMPERATURE: 98.2 F | RESPIRATION RATE: 16 BRPM

## 2019-07-11 DIAGNOSIS — Z90.3 STATUS POST GASTRECTOMY: ICD-10-CM

## 2019-07-11 DIAGNOSIS — R73.01 IMPAIRED FASTING GLUCOSE: Primary | ICD-10-CM

## 2019-07-11 DIAGNOSIS — E66.9 OBESITY (BMI 30.0-34.9): ICD-10-CM

## 2019-07-11 DIAGNOSIS — E78.5 DYSLIPIDEMIA: ICD-10-CM

## 2019-07-11 DIAGNOSIS — I10 BENIGN ESSENTIAL HYPERTENSION: ICD-10-CM

## 2019-07-11 DIAGNOSIS — M10.9 GOUT, UNSPECIFIED CAUSE, UNSPECIFIED CHRONICITY, UNSPECIFIED SITE: ICD-10-CM

## 2019-07-11 PROBLEM — K25.9 GASTRIC ULCER WITHOUT HEMORRHAGE OR PERFORATION: Status: RESOLVED | Noted: 2018-05-08 | Resolved: 2019-07-11

## 2019-07-11 PROBLEM — E80.6 HYPERBILIRUBINEMIA: Status: RESOLVED | Noted: 2019-02-03 | Resolved: 2019-07-11

## 2019-07-11 PROCEDURE — 99215 OFFICE O/P EST HI 40 MIN: CPT | Performed by: NURSE PRACTITIONER

## 2019-07-11 RX ORDER — AMLODIPINE BESYLATE 2.5 MG/1
2.5 TABLET ORAL DAILY
Qty: 90 TABLET | Refills: 1 | Status: SHIPPED | OUTPATIENT
Start: 2019-07-11 | End: 2020-04-04

## 2019-07-11 RX ORDER — ALLOPURINOL 300 MG/1
300 TABLET ORAL DAILY
Qty: 90 TABLET | Refills: 1 | Status: SHIPPED | OUTPATIENT
Start: 2019-07-11

## 2019-07-11 RX ORDER — LISINOPRIL 30 MG/1
30 TABLET ORAL DAILY
Qty: 90 TABLET | Refills: 1 | Status: SHIPPED | OUTPATIENT
Start: 2019-07-11 | End: 2020-04-04

## 2019-07-11 NOTE — ASSESSMENT & PLAN NOTE
78 lb weight loss since surg  gastric sleve jan 2019  C/o fatigue  On supplemental vitamins and has upcoming appointment

## 2019-07-11 NOTE — ASSESSMENT & PLAN NOTE
Currently on short prednisone course per ER yesterday  Also on allopurinal - only taking it daily  Tends to still drink a few glasses of wine - gave him a low purine diet  He is on a PPI daily  Defer tx to rheumatoid  Requesting note to get out of community service bc of pain - defer to rheumatoid

## 2019-07-11 NOTE — PATIENT INSTRUCTIONS
Dieta baja en purinas   LO QUE NECESITA SABER:   Soledad Daryl baja en purinas es un plan alimenticio que se basa en alimentos que tienen un bajo contenido de purinas  [place] son sustancias que se encuentran en los alimentos y que el cuerpo produce naturalmente  [place] son degradadas por el cuerpo y transformadas en ácido úrico  Normalmente los riñones filtran el ácido úrico y CLACHANDHU sale del cuerpo a través de la orina  Sin embargo, las personas con gota, algunas veces acumulan ácido úrico en la [place]  Amanda acumulamiento de ácido úrico, puede provocar inflamación y dolor (ataque de gota)  Mayra dieta baja en purinas podría ayudarlo a tratar y a evitar los ataques de [place]  INSTRUCCIONES SOBRE EL LIA HOSPITALARIA:   Alimentos que puede incluir:  Los siguientes alimentos son bajos en purinas:  · Huevos, nueces y crema de cacahuate    · Queso y helado bajos en grasa y descremados    · Leche sin grasa o del 1%    · Sopa hecha con extracto o caldo de carne    · Verduras que no estén en la lista de purinas medias que figura más abajo    · Todas las frutas y los jugos de fruta    · [place], pasta, arroz, [place], pan de [place] y palomitas de [place]    · [place], soda, te, café y cacao    · Azúcar, dulces y gelatina    · Babara Budd y aceite  Alimentos que debe limitar:   · Alimentos medios en purinas:     ¨ Florencio:  Limite lo siguiente de 4 a 6 onzas cada día  Deatra Dunn de res y [address] [facility] [place], [facility] y camarón    ¨ Verduras:  Limite las siguientes verduras a ½ taza cada día  ¨ Espárragos    ¨ Colifor    ¨ Espinaca    ¨ Champiñones    ¨ Chícharos verdes    ¨ Frijoles, chícharos y lentejas (limítese a 1 taza cada día)    ¨ Mesfin (Limítese a ? de taza de mesfin cruda cada día)  ¨ Germen de connie y salvado (Limítese a ¼ de taza cada día)    · Alimentos altos en purinas:  Limite o evite los [employer] en purinas      ¨ Anchoas, concha, almejas y mejillones    ¨ Atún, Manuel, opal y [address] [facility] caza ender nu ganso y mauri    ¨ Florencio de [place], nu sesos, corazón, Evie Shawna, hígado y mollejas    ¨ Salsas elaboradas con carne    ¨ Extractos de levadura que se consumen en forma de suplemento  Otras pautas que debe seguir:   · Aumente la ingesta de líquidos  Jelly de 8 a 16 vasos (ocho onzas) de líquido cada día  Al menos la mitad de los líquidos que ingiera deberían ser agua  Los líquidos pueden ayudar al cuerpo a eliminar el exceso de ácido úrico      · Limite o evite el consumo de alcohol  El alcohol (especialmente la cerveza) aumenta el riesgo de un ataque de gota  Las cervezas contienen jaida cantidad bridgett de purinas  · Mantenga un peso saludable  Si usted tiene sobrepeso, debería perder [employer]  La pérdida de peso puede ayudarlo a disminuir la cantidad de presión en las articulaciones  El ejercicio regular puede ayudarlo a perder peso si tiene sobrepeso o a mantenerlo si tiene un peso normal  Consulte con [place] médico antes de empezar un régimen de ejercicios  © 2017 [address]  Information is for End User's use only and may not be sold, redistributed or otherwise used for commercial purposes  All illustrations and images included in CareNotes® are the copyrighted property of A D A M , Inc  or Steve Jc  Esta información es sólo para uso en educación  [place] intención no es darle un consejo médico sobre enfermedades o tratamientos  Colsulte con [place] Honorio Willy farmacéutico antes de seguir cualquier régimen médico para saber si es seguro y efectivo para usted  Low Purine Diet   WHAT YOU NEED TO KNOW:   A low-purine diet is a meal plan based on foods that are low in purine content  Purine is a substance that is found in foods and is produced naturally by the body  Purines are broken down by the body and changed to uric acid  The kidneys normally filter the uric acid, and it leaves the body through the urine   However, people with gout sometimes have a buildup of uric acid in the blood  This buildup of uric acid can cause swelling and pain (a gout attack)  A low-purine diet may help to treat and prevent gout attacks  DISCHARGE INSTRUCTIONS:   Foods to include: The following foods are low in purine  · Eggs, nuts, and peanut butter    · Low-fat and fat-free cheese and ice cream    · Skim or 1% milk    · Soup made without meat extract or broth    · Vegetables that are not on the medium-purine list below    · All fruit and fruit juice    · Bread, pasta, rice, cakes, cornbread, and popcorn    · Water, soda, tea, coffee, and cocoa    · Sugar, sweets, and gelatin    · Fat and oil  Foods to limit:   · Medium-purine foods:     ¨ Meats:  Limit the following to 4 to 6 ounces each day  ¨ Meat and poultry     ¨ Crab, lobster, oysters, and shrimp    ¨ Vegetables:  Limit the following vegetables to ½ cup each day  ¨ Asparagus    ¨ Cauliflower    ¨ Spinach    ¨ Mushrooms    ¨ Green peas    ¨ Beans, peas, and lentils (limit to 1 cup each day)    ¨ Oats and oatmeal (limit to ? cup uncooked each day)    ¨ Wheat germ and bran (limit to ¼ cup each day)    · High-purine foods:  Limit or avoid foods high in purine  ¨ Anchovies, sardines, scallops, and mussels    ¨ Tuna, codfish, herring, and [employer], like goose and duck    [employer], such as brains, heart, kidney, liver, and sweetbreads    ¨ Gravies and sauces made with meat    ¨ Yeast extracts taken in the form of a supplement  Other guidelines to follow:   · Increase liquid intake  Drink 8 to 16 (eight-ounce) cups of liquid each day  At least half of the liquid you drink should be water  Liquid can help your body get rid of extra uric acid  · Limit or avoid alcohol  Alcohol (especially beer) increases your risk of a gout attack  Beer contains a high amount of purine  · Maintain a healthy weight  If you are overweight, you should lose weight slowly   Weight loss can help decrease the amount of stress on your joints  Regular exercise can help you lose weight if you are overweight, or maintain your weight if you are at a normal weight  Talk to your healthcare provider before you begin an exercise program   © 2017 [address] Owens Information is for End User's use only and may not be sold, redistributed or otherwise used for commercial purposes  All illustrations and images included in CareNotes® are the copyrighted property of A D A M , Inc  or Steve Jc  The above information is an  only  It is not intended as medical advice for individual conditions or treatments  Talk to your doctor, nurse or pharmacist before following any medical regimen to see if it is safe and effective for you

## 2019-12-04 ENCOUNTER — OFFICE VISIT (OUTPATIENT)
Dept: FAMILY MEDICINE CLINIC | Facility: CLINIC | Age: 54
End: 2019-12-04

## 2019-12-04 VITALS
TEMPERATURE: 97.6 F | HEART RATE: 74 BPM | WEIGHT: 195 LBS | DIASTOLIC BLOOD PRESSURE: 84 MMHG | SYSTOLIC BLOOD PRESSURE: 126 MMHG | BODY MASS INDEX: 29.55 KG/M2 | RESPIRATION RATE: 18 BRPM | HEIGHT: 68 IN | OXYGEN SATURATION: 98 %

## 2019-12-04 DIAGNOSIS — M10.9 GOUT, UNSPECIFIED CAUSE, UNSPECIFIED CHRONICITY, UNSPECIFIED SITE: ICD-10-CM

## 2019-12-04 DIAGNOSIS — Z90.3 STATUS POST GASTRECTOMY: ICD-10-CM

## 2019-12-04 DIAGNOSIS — E78.5 DYSLIPIDEMIA: Primary | ICD-10-CM

## 2019-12-04 DIAGNOSIS — I10 BENIGN ESSENTIAL HYPERTENSION: ICD-10-CM

## 2019-12-04 PROCEDURE — 1036F TOBACCO NON-USER: CPT | Performed by: FAMILY MEDICINE

## 2019-12-04 PROCEDURE — 3074F SYST BP LT 130 MM HG: CPT | Performed by: FAMILY MEDICINE

## 2019-12-04 PROCEDURE — 90471 IMMUNIZATION ADMIN: CPT | Performed by: FAMILY MEDICINE

## 2019-12-04 PROCEDURE — 90682 RIV4 VACC RECOMBINANT DNA IM: CPT | Performed by: FAMILY MEDICINE

## 2019-12-04 PROCEDURE — 99213 OFFICE O/P EST LOW 20 MIN: CPT | Performed by: FAMILY MEDICINE

## 2019-12-04 PROCEDURE — 3079F DIAST BP 80-89 MM HG: CPT | Performed by: FAMILY MEDICINE

## 2019-12-04 PROCEDURE — 3008F BODY MASS INDEX DOCD: CPT | Performed by: FAMILY MEDICINE

## 2019-12-04 NOTE — ASSESSMENT & PLAN NOTE
Treated by [facility] Gout arthritis   On prednisone 10 mg and allopurinol 600 mg/day  Next appointment in December 12, 2019

## 2019-12-04 NOTE — PROGRESS NOTES
Assessment/Plan:    Gout  Treated by [facility] Gout arthritis   On prednisone 10 mg and allopurinol 600 mg/day  Next appointment in December 12, 2019    Adenomatous polyp of sigmoid colon  One year ago  Next one is in October ,2023  5 year after the previous one    Benign essential hypertension  Patient is on lisinopril and Norvasc as his blood pressure is well controlled today  At goal per JNC 8 guidelines:Yes  Current Medication regime includes ACEI/ARB: Yes, goal is 140/90  Microalb/Cr ratio: will order  No results found for: MICROALBCRE  ASCVD risk:6 6%  Statin currently used: Yes, lovastatin  Current Alcohol Usage: No, rarely socially, Counseled on usage No  Current Cigarette smoker: No, ready to quit N/A, Counseled on usage N/A  Current diet involves salt restriction: Yes  Currently Physical Active: Yes       Diagnoses and all orders for this visit:    Dyslipidemia  -     HIV 1/2 AG-AB combo; Future  -     Comprehensive metabolic panel; Future    Gout, unspecified cause, unspecified chronicity, unspecified site  -     HIV 1/2 AG-AB combo; Future    Status post gastrectomy  -     Comprehensive metabolic panel; Future    Benign essential hypertension  -     Comprehensive metabolic panel; Future  -     Lipid panel; Future  -     TSH, 3rd generation with Free T4 reflex; Future  -     Vitamin B12; Future  -     TIBC; Future  -     Iron; Future  -     Folate; Future  -     CBC and differential; Future  -     Ferritin; Future  -     influenza vaccine, [id number], quadrivalent, recombinant, PF, 0 5 mL, for patients 18 yr+ (FLUBLOK)          Subjective:      Patient ID: Daniel Baldwin is a 49 y o  male  Patient is here 1st time in the clinic  He has a history of gouty arthritis for which he is on prednisone and allopurinol from the [facility]  He has a follow-up visit with them in December 2019  His arthritis symptoms seem to be controlled at this time    Patient also has a history of hypertension for which he is on lisinopril and lovastatin along with Norvasc  His blood pressure is well controlled today  He has a history of impaired fasting blood glucose   Patient speaks mostly Polish induced a  speaker phone 4 translation  Patient also has a history of gastric bypass this year and he has lost about 90 lb from that  HPI    The following portions of the patient's history were reviewed and updated as appropriate: past family history and past social history  Review of Systems   Constitutional: Negative for chills, fatigue and fever  HENT: Negative for hearing loss  Respiratory: Negative for chest tightness and shortness of breath  Cardiovascular: Negative for chest pain, palpitations and leg swelling  Gastrointestinal: Negative for abdominal pain  Endocrine: Negative for polyuria  Musculoskeletal: Positive for joint swelling  Negative for arthralgias  Skin: Negative for color change  Neurological: Negative for dizziness  Psychiatric/Behavioral: Negative for agitation  Objective:      /84 (BP Location: Right arm, Patient Position: Sitting, Cuff Size: Standard)   Pulse 74   Temp 97 6 °F (36 4 °C) (Temporal)   Resp 18   Ht 5' 8" (1 727 m)   Wt 88 5 kg (195 lb)   SpO2 98%   BMI 29 65 kg/m²          Physical Exam   Constitutional: He is oriented to person, place, and time  He appears well-developed  HENT:   Head: Normocephalic  Eyes: Pupils are equal, round, and reactive to light  Conjunctivae are normal    Neck: Normal range of motion  Neck supple  Cardiovascular: Normal rate, regular rhythm and normal heart sounds  Pulmonary/Chest: Effort normal and breath sounds normal    Abdominal: Soft  Bowel sounds are normal    Musculoskeletal: He exhibits no edema or tenderness  Neurological: He is alert and oriented to person, place, and time  Skin: Skin is warm and dry

## 2019-12-04 NOTE — ASSESSMENT & PLAN NOTE
Patient is on lisinopril and Norvasc as his blood pressure is well controlled today  At goal per JNC 8 guidelines:Yes  Current Medication regime includes ACEI/ARB: Yes, goal is 140/90  Microalb/Cr ratio: will order  No results found for: MICROALBCRE  ASCVD risk:6 6%  Statin currently used: Yes, lovastatin  Current Alcohol Usage: No, rarely socially, Counseled on usage No  Current Cigarette smoker: No, ready to quit N/A, Counseled on usage N/A  Current diet involves salt restriction: Yes  Currently Physical Active:  Yes

## 2020-01-19 DIAGNOSIS — E78.5 HYPERLIPIDEMIA, UNSPECIFIED HYPERLIPIDEMIA TYPE: ICD-10-CM

## 2020-01-20 RX ORDER — LOVASTATIN 40 MG/1
40 TABLET ORAL
Qty: 30 TABLET | Refills: 0 | Status: SHIPPED | OUTPATIENT
Start: 2020-01-20 | End: 2020-02-06 | Stop reason: ALTCHOICE

## 2020-01-20 NOTE — TELEPHONE ENCOUNTER
Patient is due for his blood work and lipid panel  I have given him 30 day supply for lovastatin  This gives him enough time to get his blood work for next refill  Blood work to be completed before request for next refill

## 2020-01-21 ENCOUNTER — APPOINTMENT (OUTPATIENT)
Dept: LAB | Facility: CLINIC | Age: 55
End: 2020-01-21
Payer: COMMERCIAL

## 2020-01-21 DIAGNOSIS — E78.5 DYSLIPIDEMIA: ICD-10-CM

## 2020-01-21 DIAGNOSIS — M10.9 GOUT, UNSPECIFIED CAUSE, UNSPECIFIED CHRONICITY, UNSPECIFIED SITE: ICD-10-CM

## 2020-01-21 DIAGNOSIS — E66.9 OBESITY (BMI 30.0-34.9): ICD-10-CM

## 2020-01-21 DIAGNOSIS — R73.01 IMPAIRED FASTING GLUCOSE: ICD-10-CM

## 2020-01-21 DIAGNOSIS — Z90.3 STATUS POST GASTRECTOMY: ICD-10-CM

## 2020-01-21 DIAGNOSIS — I10 BENIGN ESSENTIAL HYPERTENSION: ICD-10-CM

## 2020-01-21 LAB
ALBUMIN SERPL BCP-MCNC: 3.8 G/DL (ref 3.5–5)
ALP SERPL-CCNC: 115 U/L (ref 46–116)
ALT SERPL W P-5'-P-CCNC: 31 U/L (ref 12–78)
ANION GAP SERPL CALCULATED.3IONS-SCNC: 5 MMOL/L (ref 4–13)
AST SERPL W P-5'-P-CCNC: 21 U/L (ref 5–45)
BASOPHILS # BLD AUTO: 0.02 THOUSANDS/ΜL (ref 0–0.1)
BASOPHILS NFR BLD AUTO: 0 % (ref 0–1)
BILIRUB SERPL-MCNC: 0.59 MG/DL (ref 0.2–1)
BUN SERPL-MCNC: 15 MG/DL (ref 5–25)
CALCIUM SERPL-MCNC: 9.7 MG/DL (ref 8.3–10.1)
CHLORIDE SERPL-SCNC: 104 MMOL/L (ref 100–108)
CHOLEST SERPL-MCNC: 225 MG/DL (ref 50–200)
CO2 SERPL-SCNC: 32 MMOL/L (ref 21–32)
CREAT SERPL-MCNC: 0.84 MG/DL (ref 0.6–1.3)
EOSINOPHIL # BLD AUTO: 0.11 THOUSAND/ΜL (ref 0–0.61)
EOSINOPHIL NFR BLD AUTO: 1 % (ref 0–6)
ERYTHROCYTE [DISTWIDTH] IN BLOOD BY AUTOMATED COUNT: 12.5 % (ref 11.6–15.1)
EST. AVERAGE GLUCOSE BLD GHB EST-MCNC: 108 MG/DL
FERRITIN SERPL-MCNC: 113 NG/ML (ref 8–388)
FOLATE SERPL-MCNC: 8 NG/ML (ref 3.1–17.5)
GFR SERPL CREATININE-BSD FRML MDRD: 116 ML/MIN/1.73SQ M
GLUCOSE P FAST SERPL-MCNC: 75 MG/DL (ref 65–99)
HBA1C MFR BLD: 5.4 % (ref 4.2–6.3)
HCT VFR BLD AUTO: 51.7 % (ref 36.5–49.3)
HDLC SERPL-MCNC: 57 MG/DL
HGB BLD-MCNC: 16.9 G/DL (ref 12–17)
IMM GRANULOCYTES # BLD AUTO: 0.02 THOUSAND/UL (ref 0–0.2)
IMM GRANULOCYTES NFR BLD AUTO: 0 % (ref 0–2)
IRON SERPL-MCNC: 193 UG/DL (ref 65–175)
LDLC SERPL CALC-MCNC: 126 MG/DL (ref 0–100)
LYMPHOCYTES # BLD AUTO: 1.8 THOUSANDS/ΜL (ref 0.6–4.47)
LYMPHOCYTES NFR BLD AUTO: 22 % (ref 14–44)
MCH RBC QN AUTO: 32.1 PG (ref 26.8–34.3)
MCHC RBC AUTO-ENTMCNC: 32.7 G/DL (ref 31.4–37.4)
MCV RBC AUTO: 98 FL (ref 82–98)
MONOCYTES # BLD AUTO: 0.78 THOUSAND/ΜL (ref 0.17–1.22)
MONOCYTES NFR BLD AUTO: 9 % (ref 4–12)
NEUTROPHILS # BLD AUTO: 5.55 THOUSANDS/ΜL (ref 1.85–7.62)
NEUTS SEG NFR BLD AUTO: 68 % (ref 43–75)
NONHDLC SERPL-MCNC: 168 MG/DL
NRBC BLD AUTO-RTO: 0 /100 WBCS
PLATELET # BLD AUTO: 222 THOUSANDS/UL (ref 149–390)
PMV BLD AUTO: 10.8 FL (ref 8.9–12.7)
POTASSIUM SERPL-SCNC: 3.9 MMOL/L (ref 3.5–5.3)
PROT SERPL-MCNC: 7.7 G/DL (ref 6.4–8.2)
RBC # BLD AUTO: 5.26 MILLION/UL (ref 3.88–5.62)
SODIUM SERPL-SCNC: 141 MMOL/L (ref 136–145)
TIBC SERPL-MCNC: 331 UG/DL (ref 250–450)
TRIGL SERPL-MCNC: 211 MG/DL
TSH SERPL DL<=0.05 MIU/L-ACNC: 2.85 UIU/ML (ref 0.36–3.74)
URATE SERPL-MCNC: 8.3 MG/DL (ref 4.2–8)
VIT B12 SERPL-MCNC: 320 PG/ML (ref 100–900)
WBC # BLD AUTO: 8.28 THOUSAND/UL (ref 4.31–10.16)

## 2020-01-21 PROCEDURE — 87389 HIV-1 AG W/HIV-1&-2 AB AG IA: CPT

## 2020-01-21 PROCEDURE — 83036 HEMOGLOBIN GLYCOSYLATED A1C: CPT

## 2020-01-21 PROCEDURE — 36415 COLL VENOUS BLD VENIPUNCTURE: CPT

## 2020-01-21 PROCEDURE — 82607 VITAMIN B-12: CPT

## 2020-01-21 PROCEDURE — 82728 ASSAY OF FERRITIN: CPT

## 2020-01-21 PROCEDURE — 85025 COMPLETE CBC W/AUTO DIFF WBC: CPT

## 2020-01-21 PROCEDURE — 80053 COMPREHEN METABOLIC PANEL: CPT

## 2020-01-21 PROCEDURE — 84443 ASSAY THYROID STIM HORMONE: CPT

## 2020-01-21 PROCEDURE — 82746 ASSAY OF FOLIC ACID SERUM: CPT

## 2020-01-21 PROCEDURE — 84550 ASSAY OF BLOOD/URIC ACID: CPT

## 2020-01-21 PROCEDURE — 83540 ASSAY OF IRON: CPT

## 2020-01-21 PROCEDURE — 80061 LIPID PANEL: CPT

## 2020-01-21 PROCEDURE — 83550 IRON BINDING TEST: CPT

## 2020-01-22 DIAGNOSIS — E53.8 VITAMIN B 12 DEFICIENCY: Primary | ICD-10-CM

## 2020-01-22 LAB — HIV 1+2 AB+HIV1 P24 AG SERPL QL IA: NORMAL

## 2020-01-22 RX ORDER — UREA 10 %
100 LOTION (ML) TOPICAL DAILY
Qty: 90 TABLET | Refills: 0 | Status: SHIPPED | OUTPATIENT
Start: 2020-01-22

## 2020-01-24 ENCOUNTER — TELEPHONE (OUTPATIENT)
Dept: FAMILY MEDICINE CLINIC | Facility: CLINIC | Age: 55
End: 2020-01-24

## 2020-01-24 NOTE — TELEPHONE ENCOUNTER
Advised pt's wife of results and recommendations  She will let pt know  She had no questions or concerns at the time

## 2020-01-24 NOTE — TELEPHONE ENCOUNTER
----- Message from Bandar Mortensen MD sent at 1/24/2020  9:14 AM EST -----  Regarding: FW: Cancellation of Order # [id number]  Please let the patient know that her cholesterol is high and that he needs to eat healthy food and avoid meat and animal fat  To keep his follow up appointment     Thanks Ashlyhumphrey Adilene    ----- Message -----  From: Lab, Background User  Sent: 1/21/2020   7:02 AM EST  To: Bandar Mortensen MD  Subject: Cancellation of Order # [id number]                Order number [id number] for the procedure LIPID PANEL Drake Gonzalez has   been canceled by Lab, Background User [LABBACKGROUND]  This   procedure was ordered by you on Jan 21, 2020 for the patient Po Queen [[id number]]  The reason for cancellation was   "Duplicate"

## 2020-02-06 ENCOUNTER — OFFICE VISIT (OUTPATIENT)
Dept: FAMILY MEDICINE CLINIC | Facility: CLINIC | Age: 55
End: 2020-02-06

## 2020-02-06 VITALS
DIASTOLIC BLOOD PRESSURE: 76 MMHG | WEIGHT: 189 LBS | RESPIRATION RATE: 16 BRPM | HEART RATE: 94 BPM | BODY MASS INDEX: 28.74 KG/M2 | SYSTOLIC BLOOD PRESSURE: 122 MMHG | OXYGEN SATURATION: 98 % | TEMPERATURE: 97.5 F

## 2020-02-06 DIAGNOSIS — Z90.3 STATUS POST GASTRECTOMY: ICD-10-CM

## 2020-02-06 DIAGNOSIS — I10 BENIGN ESSENTIAL HYPERTENSION: ICD-10-CM

## 2020-02-06 DIAGNOSIS — R10.84 GENERALIZED ABDOMINAL PAIN: ICD-10-CM

## 2020-02-06 DIAGNOSIS — Z98.84 BARIATRIC SURGERY STATUS: ICD-10-CM

## 2020-02-06 DIAGNOSIS — E78.5 DYSLIPIDEMIA: Primary | ICD-10-CM

## 2020-02-06 PROBLEM — E66.01 MORBID OBESITY (HCC): Status: RESOLVED | Noted: 2017-12-08 | Resolved: 2020-02-06

## 2020-02-06 PROCEDURE — 3074F SYST BP LT 130 MM HG: CPT | Performed by: FAMILY MEDICINE

## 2020-02-06 PROCEDURE — 1036F TOBACCO NON-USER: CPT | Performed by: FAMILY MEDICINE

## 2020-02-06 PROCEDURE — 3078F DIAST BP <80 MM HG: CPT | Performed by: FAMILY MEDICINE

## 2020-02-06 PROCEDURE — T1015 CLINIC SERVICE: HCPCS | Performed by: FAMILY MEDICINE

## 2020-02-06 PROCEDURE — 99213 OFFICE O/P EST LOW 20 MIN: CPT | Performed by: FAMILY MEDICINE

## 2020-02-06 RX ORDER — PEDI MULTIVIT NO.7/FOLIC ACID 100 MCG
1 TABLET,CHEWABLE ORAL DAILY
Qty: 30 TABLET | Refills: 1 | Status: SHIPPED | OUTPATIENT
Start: 2020-02-06

## 2020-02-06 RX ORDER — UBIDECARENONE 75 MG
250 CAPSULE ORAL DAILY
Qty: 90 TABLET | Refills: 0 | Status: SHIPPED | OUTPATIENT
Start: 2020-02-06

## 2020-02-06 RX ORDER — ATORVASTATIN CALCIUM 40 MG/1
40 TABLET, FILM COATED ORAL DAILY
Qty: 30 TABLET | Refills: 3 | Status: SHIPPED | OUTPATIENT
Start: 2020-02-06 | End: 2020-06-03 | Stop reason: SDUPTHER

## 2020-02-06 RX ORDER — MELATONIN
1000 DAILY
Qty: 90 TABLET | Refills: 1 | Status: SHIPPED | OUTPATIENT
Start: 2020-02-06 | End: 2020-07-31

## 2020-02-06 NOTE — ASSESSMENT & PLAN NOTE
Again counseled on diets that exacerbate gouty attack including wine and red meat    To continue with allopurinol for now  Reviewed blood work including uric acid

## 2020-02-06 NOTE — PROGRESS NOTES
Assessment/Plan:    Dyslipidemia  Dietary goes counseling provided to the patient  He is on a bariatric diet  Avoid fatty meal including hamburgers and pork  Patient expressed understanding  Change lovastatin to atorvastatin 40 mg once a day  Will repeat lipid panel in 6 months    Gout  Again counseled on diets that exacerbate gouty attack including wine and red meat  To continue with allopurinol for now  Reviewed blood work including uric acid    Status post gastrectomy  Patient recovered very well from the Pediatric surgery almost a year ago  so far lost over 110 lb  Patient claimed he still needs to lose 9 lb to be at his goal of 180 lb  Encouraged the patient on his goal setting and follow through    Benign essential hypertension  Reviewed labs with him continue lisinopril and amlodipine at this time  His follow-up visit may consider stopping Norvasc if his blood pressure still remains good       Diagnoses and all orders for this visit:    Dyslipidemia  -     atorvastatin (LIPITOR) 40 mg tablet; Take 1 tablet (40 mg total) by mouth daily    Bariatric surgery status  -     cholecalciferol (VITAMIN D3) 1,000 units tablet; Take 1 tablet (1,000 Units total) by mouth daily  -     cyanocobalamin (VITAMIN B-12) 100 mcg tablet; Take 2 5 tablets (250 mcg total) by mouth daily  -     Pediatric Multivit-Minerals-C (FLINTSTONES GUMMIES) chewable tablet; Chew 1 tablet daily    Generalized abdominal pain  -     bismuth subsalicylate (PEPTO BISMOL) 524 mg/30 mL oral suspension; Take 15 mL (262 mg total) by mouth every 6 (six) hours as needed for indigestion    Status post gastrectomy    Benign essential hypertension          Subjective:      Patient ID: Jolynn Ramos is a 49 y o  male  Patient doing well has lost several lb since his last visit  He is status post care bariatric surgery a year ago        HPI    The following portions of the patient's history were reviewed and updated as appropriate: past family history and past social history  Review of Systems   Constitutional: Negative for chills, fatigue and fever  HENT: Negative for hearing loss  Respiratory: Negative for chest tightness and shortness of breath  Cardiovascular: Negative for chest pain, palpitations and leg swelling  Gastrointestinal: Negative for abdominal pain  Endocrine: Negative for polyuria  Musculoskeletal: Negative for arthralgias  Skin: Negative for color change  Neurological: Negative for dizziness  Psychiatric/Behavioral: Negative for agitation  Objective:      /76 (BP Location: Right arm, Patient Position: Sitting, Cuff Size: Standard)   Pulse 94   Temp 97 5 °F (36 4 °C)   Resp 16   Wt 85 7 kg (189 lb)   SpO2 98%   BMI 28 74 kg/m²          Physical Exam   Constitutional: He is oriented to person, place, and time  He appears well-developed  HENT:   Head: Normocephalic  Eyes: Pupils are equal, round, and reactive to light  Conjunctivae are normal    Neck: Normal range of motion  Neck supple  Cardiovascular: Normal rate, regular rhythm and normal heart sounds  Pulmonary/Chest: Effort normal and breath sounds normal    Abdominal: Soft  Bowel sounds are normal    Small scars noted from previous bariatric surgery that has healed well    Musculoskeletal: He exhibits no edema or tenderness  Neurological: He is alert and oriented to person, place, and time  Skin: Skin is warm and dry

## 2020-02-06 NOTE — ASSESSMENT & PLAN NOTE
Dietary goes counseling provided to the patient  He is on a bariatric diet  Avoid fatty meal including hamburgers and pork  Patient expressed understanding    Change lovastatin to atorvastatin 40 mg once a day  Will repeat lipid panel in 6 months

## 2020-02-06 NOTE — ASSESSMENT & PLAN NOTE
Reviewed labs with him continue lisinopril and amlodipine at this time    His follow-up visit may consider stopping Norvasc if his blood pressure still remains good

## 2020-02-06 NOTE — ASSESSMENT & PLAN NOTE
Patient recovered very well from the Pediatric surgery almost a year ago   so far lost over 110 lb  Patient claimed he still needs to lose 9 lb to be at his goal of 180 lb  Encouraged the patient on his goal setting and follow through

## 2020-04-02 DIAGNOSIS — I10 BENIGN ESSENTIAL HYPERTENSION: ICD-10-CM

## 2020-04-04 RX ORDER — LISINOPRIL 30 MG/1
TABLET ORAL
Qty: 90 TABLET | Refills: 0 | Status: SHIPPED | OUTPATIENT
Start: 2020-04-04 | End: 2020-08-30

## 2020-04-04 RX ORDER — AMLODIPINE BESYLATE 2.5 MG/1
TABLET ORAL
Qty: 90 TABLET | Refills: 0 | Status: SHIPPED | OUTPATIENT
Start: 2020-04-04

## 2020-04-23 ENCOUNTER — TELEPHONE (OUTPATIENT)
Dept: FAMILY MEDICINE CLINIC | Facility: CLINIC | Age: 55
End: 2020-04-23

## 2020-06-02 DIAGNOSIS — E78.5 DYSLIPIDEMIA: ICD-10-CM

## 2020-06-03 DIAGNOSIS — E78.5 DYSLIPIDEMIA: ICD-10-CM

## 2020-06-05 RX ORDER — ATORVASTATIN CALCIUM 40 MG/1
40 TABLET, FILM COATED ORAL DAILY
Qty: 30 TABLET | Refills: 3 | Status: SHIPPED | OUTPATIENT
Start: 2020-06-05

## 2020-06-05 RX ORDER — ATORVASTATIN CALCIUM 40 MG/1
TABLET, FILM COATED ORAL
Qty: 30 TABLET | Refills: 2 | Status: SHIPPED | OUTPATIENT
Start: 2020-06-05

## 2020-07-30 DIAGNOSIS — Z98.84 BARIATRIC SURGERY STATUS: ICD-10-CM

## 2020-07-31 RX ORDER — MELATONIN
Qty: 90 TABLET | Refills: 4 | Status: SHIPPED | OUTPATIENT
Start: 2020-07-31

## 2020-08-28 DIAGNOSIS — I10 BENIGN ESSENTIAL HYPERTENSION: ICD-10-CM

## 2020-08-30 RX ORDER — LISINOPRIL 30 MG/1
TABLET ORAL
Qty: 90 TABLET | Refills: 0 | Status: SHIPPED | OUTPATIENT
Start: 2020-08-30

## 2020-12-24 ENCOUNTER — OFFICE VISIT (OUTPATIENT)
Dept: URGENT CARE | Age: 55
End: 2020-12-24
Payer: COMMERCIAL

## 2020-12-24 VITALS — HEART RATE: 78 BPM | TEMPERATURE: 98.6 F | RESPIRATION RATE: 18 BRPM | OXYGEN SATURATION: 100 %

## 2020-12-24 DIAGNOSIS — Z20.822 ENCOUNTER FOR SCREENING LABORATORY TESTING FOR COVID-19 VIRUS: Primary | ICD-10-CM

## 2020-12-24 PROCEDURE — 99283 EMERGENCY DEPT VISIT LOW MDM: CPT | Performed by: NURSE PRACTITIONER

## 2020-12-24 PROCEDURE — G0382 LEV 3 HOSP TYPE B ED VISIT: HCPCS | Performed by: NURSE PRACTITIONER

## 2020-12-24 PROCEDURE — 99203 OFFICE O/P NEW LOW 30 MIN: CPT | Performed by: NURSE PRACTITIONER

## 2020-12-24 PROCEDURE — U0003 INFECTIOUS AGENT DETECTION BY NUCLEIC ACID (DNA OR RNA); SEVERE ACUTE RESPIRATORY SYNDROME CORONAVIRUS 2 (SARS-COV-2) (CORONAVIRUS DISEASE [COVID-19]), AMPLIFIED PROBE TECHNIQUE, MAKING USE OF HIGH THROUGHPUT TECHNOLOGIES AS DESCRIBED BY CMS-2020-01-R: HCPCS | Performed by: NURSE PRACTITIONER

## 2020-12-27 LAB — SARS-COV-2 RNA SPEC QL NAA+PROBE: DETECTED

## 2020-12-29 ENCOUNTER — TELEPHONE (OUTPATIENT)
Dept: URGENT CARE | Age: 55
End: 2020-12-29

## 2021-02-17 ENCOUNTER — HOSPITAL ENCOUNTER (EMERGENCY)
Facility: HOSPITAL | Age: 56
Discharge: HOME | End: 2021-02-17
Attending: EMERGENCY MEDICINE | Admitting: EMERGENCY MEDICINE
Payer: COMMERCIAL

## 2021-02-17 VITALS
HEART RATE: 60 BPM | SYSTOLIC BLOOD PRESSURE: 156 MMHG | OXYGEN SATURATION: 98 % | BODY MASS INDEX: 28.64 KG/M2 | RESPIRATION RATE: 16 BRPM | WEIGHT: 189 LBS | DIASTOLIC BLOOD PRESSURE: 94 MMHG | TEMPERATURE: 97.8 F | HEIGHT: 68 IN

## 2021-02-17 DIAGNOSIS — M25.511 ACUTE PAIN OF RIGHT SHOULDER: Primary | ICD-10-CM

## 2021-02-17 PROCEDURE — 99283 EMERGENCY DEPT VISIT LOW MDM: CPT

## 2021-02-17 PROCEDURE — 99284 EMERGENCY DEPT VISIT MOD MDM: CPT | Performed by: EMERGENCY MEDICINE

## 2021-02-17 RX ORDER — PREDNISONE 20 MG/1
60 TABLET ORAL ONCE
Status: COMPLETED | OUTPATIENT
Start: 2021-02-17 | End: 2021-02-17

## 2021-02-17 RX ORDER — PREDNISONE 20 MG/1
TABLET ORAL
Qty: 10 TABLET | Refills: 0 | Status: SHIPPED | OUTPATIENT
Start: 2021-02-17 | End: 2021-02-24

## 2021-02-17 RX ADMIN — PREDNISONE 60 MG: 20 TABLET ORAL at 14:03

## 2021-02-17 NOTE — ED PROVIDER NOTES
History  Chief Complaint   Patient presents with    Shoulder Pain     Patient reports right shoulder pain that has been going on for a week, hx of injection (cortisol?) for pain  Patient reports arthritis and gout  Patient is a 50-year-old male with a history of gout, osteoarthritis and bariatric surgery who presents with right shoulder pain  Patient states that he has had migratory joint pains for many years  He has had pains in his hands, knees and shoulders previously  He started having right shoulder pain about 1 week ago  This pain is similar to previous arthralgias  He denies any specific trauma or injury to the right shoulder  He denies any associated chest pain, shortness of breath, fever, chills or other complaints  He has been taking tramadol and Percocet, which he got from his wife  He also took a dose of naproxen without relief  He had left over prednisone and took a dose of prednisone 20 mg without relief  He believes he needs a higher dose  He decribes pain upon palpation of the right shoulder as well as pain with range of motion  He has no other complaints at this time  History provided by:  Patient  Shoulder Pain  Location:  Shoulder  Shoulder location:  R shoulder  Injury: no    Pain details:     Radiates to:  Does not radiate    Severity:  Severe    Duration:  1 week    Timing:  Constant    Progression:  Unchanged  Dislocation: no    Prior injury to area:  No  Worsened by: Movement  Ineffective treatments:  Narcotics  Associated symptoms: decreased range of motion and stiffness    Associated symptoms: no back pain, no fever, no neck pain, no numbness, no swelling and no tingling        Prior to Admission Medications   Prescriptions Last Dose Informant Patient Reported? Taking?    Pediatric Multivit-Minerals-C (FLINTSTONES GUMMIES) chewable tablet   No No   Sig: Chew 1 tablet daily   Patient not taking: Reported on 12/24/2020   allopurinol (ZYLOPRIM) 300 mg tablet  Self No No Sig: Take 1 tablet (300 mg total) by mouth daily   Patient not taking: Reported on 12/24/2020   amLODIPine (NORVASC) 2 5 mg tablet   No No   Sig: TAKE ONE TABLET BY MOUTH DAILY AS DIRECTED   Patient not taking: Reported on 12/24/2020   atorvastatin (LIPITOR) 40 mg tablet   No No   Sig: TAKE ONE TABLET BY MOUTH DAILY   Patient not taking: Reported on 12/24/2020   atorvastatin (LIPITOR) 40 mg tablet   No No   Sig: Take 1 tablet (40 mg total) by mouth daily   Patient not taking: Reported on 09/84/3515   bismuth subsalicylate (PEPTO BISMOL) 524 mg/30 mL oral suspension   No No   Sig: Take 15 mL (262 mg total) by mouth every 6 (six) hours as needed for indigestion   Patient not taking: Reported on 12/24/2020   cholecalciferol (VITAMIN D3) 1,000 units tablet   No No   Sig: TAKE ONE TABLET BY MOUTH DAILY   Patient not taking: Reported on 12/24/2020   cyanocobalamin (VITAMIN B-12) 100 mcg tablet   No No   Sig: Take 2 5 tablets (250 mcg total) by mouth daily   Patient not taking: Reported on 12/24/2020   fenofibrate (TRIGLIDE) 160 MG tablet  Self No No   Sig: TAKE ONE TABLET BY MOUTH DAILY   Patient not taking: Reported on 12/24/2020   lisinopril (ZESTRIL) 30 mg tablet   No No   Sig: TAKE ONE TABLET BY MOUTH DAILY   Patient not taking: Reported on 12/24/2020   omeprazole (PriLOSEC) 20 mg delayed release capsule  Self No No   Sig: TAKE TWO CAPSULES BY MOUTH DAILY   Patient not taking: Reported on 12/24/2020   vitamin B-12 (CYANOCOBALAMIN) 100 MCG tablet  Self No No   Sig: Take 1 tablet (100 mcg total) by mouth daily   Patient not taking: Reported on 12/24/2020      Facility-Administered Medications: None       Past Medical History:   Diagnosis Date    Arthritis     GERD (gastroesophageal reflux disease)     Gout     Gout     Hyperbilirubinemia 2/3/2019    Hypercholesteremia     Hypertension     Low back pain        Past Surgical History:   Procedure Laterality Date    COLONOSCOPY      LAPAROSCOPIC GASTRIC BANDING  [place] ESOPHAGOGASTRODUODENOSCOPY TRANSORAL DIAGNOSTIC N/A 7/10/2018    Procedure: EGD AND COLONOSCOPY;  Surgeon: Tina Davis MD;  Location: [facility]; Service: Gastroenterology       Family History   Problem Relation Age of Onset    Diabetes Mother     Seizures Mother     No Known Problems Father     No Known Problems Family      I have reviewed and agree with the history as documented  E-Cigarette/Vaping     E-Cigarette/Vaping Substances     Social History     Tobacco Use    Smoking status: Never Smoker    Smokeless tobacco: Never Used   Substance Use Topics    Alcohol use: Yes     Comment: occasionally / social     Drug use: No       Review of Systems   Constitutional: Negative for chills, diaphoresis and fever  HENT: Negative for nosebleeds, sore throat and trouble swallowing  Eyes: Negative for photophobia, pain and visual disturbance  Respiratory: Negative for cough, chest tightness and shortness of breath  Cardiovascular: Negative for chest pain, palpitations and leg swelling  Gastrointestinal: Negative for abdominal pain, constipation, diarrhea, nausea and vomiting  Endocrine: Negative for polydipsia and polyuria  Genitourinary: Negative for difficulty urinating, dysuria and hematuria  Musculoskeletal: Positive for arthralgias and stiffness  Negative for back pain, neck pain and neck stiffness  Skin: Negative for pallor and rash  Neurological: Negative for dizziness, syncope, light-headedness and headaches  All other systems reviewed and are negative  Physical Exam  Physical Exam  Vitals signs and nursing note reviewed  Constitutional:       General: He is not in acute distress  Appearance: He is well-developed  HENT:      Head: Normocephalic and atraumatic  Eyes:      Pupils: Pupils are equal, round, and reactive to light  Neck:      Musculoskeletal: Normal range of motion and neck supple     Cardiovascular:      Rate and Rhythm: Normal rate and regular rhythm  Pulses: Normal pulses  Heart sounds: Normal heart sounds  Pulmonary:      Effort: Pulmonary effort is normal  No respiratory distress  Breath sounds: Normal breath sounds  Abdominal:      General: There is no distension  Palpations: Abdomen is soft  Abdomen is not rigid  Tenderness: There is no abdominal tenderness  There is no guarding or rebound  Musculoskeletal:      Right shoulder: He exhibits decreased range of motion (Secondary to pain) and tenderness (Tenderness to palpation over the anterior shoulder  )  He exhibits no deformity  Lymphadenopathy:      Cervical: No cervical adenopathy  Skin:     General: Skin is warm and dry  Capillary Refill: Capillary refill takes less than 2 seconds  Neurological:      Mental Status: He is alert and oriented to person, place, and time  Cranial Nerves: No cranial nerve deficit  Sensory: No sensory deficit  Vital Signs  ED Triage Vitals [02/17/21 1309]   Temperature Pulse Respirations Blood Pressure SpO2   97 8 °F (36 6 °C) 60 16 156/94 98 %      Temp Source Heart Rate Source Patient Position - Orthostatic VS BP Location FiO2 (%)   Oral Monitor Lying Left arm --      Pain Score       Worst Possible Pain           Vitals:    02/17/21 1309   BP: 156/94   Pulse: 60   Patient Position - Orthostatic VS: Lying         Visual Acuity      ED Medications  Medications   predniSONE tablet 60 mg (60 mg Oral Given 2/17/21 1403)       Diagnostic Studies  Results Reviewed     None                 No orders to display              Procedures  Procedures         ED Course                             SBIRT 20yo+      Most Recent Value   SBIRT (22 yo +)   In order to provide better care to our patients, we are screening all of our patients for alcohol and drug use  Would it be okay to ask you these screening questions?   No Filed at: 02/17/2021 1406                    MDM  Number of Diagnoses or Management Options  Acute pain of right shoulder: new and does not require workup  Diagnosis management comments: Patient with a history of osteoarthritis and gout presents with right shoulder pain  This is atraumatic shoulder pain  Therefore I did not obtain imaging  He does not appear septic  There is no obvious joint effusion or overlying erythema  He does have reproducible tenderness to palpation and with range of motion  Suspect pain secondary to osteoarthritis  Less likely acute gout flare  Patient has experienced significant relief of similar pain with steroids in the past   I discussed not taking NSAIDs given his history of bariatric surgery  Prednisone is also not ideal but he has tolerated limited courses of prednisone in the past   Will treat with a tapering dose  I recommended that patient follow-up with his physician as soon as possible  Encouraged him to return to ED if symptoms worsen  Patient very well-appearing upon discharge  Amount and/or Complexity of Data Reviewed  Review and summarize past medical records: yes    Risk of Complications, Morbidity, and/or Mortality  Presenting problems: moderate  Diagnostic procedures: minimal  Management options: moderate    Patient Progress  Patient progress: stable      Disposition  Final diagnoses:   Acute pain of right shoulder     Time reflects when diagnosis was documented in both MDM as applicable and the Disposition within this note     Time User Action Codes Description Comment    2/17/2021  2:00 PM Ann Certain Add [G17 787] Acute pain of right shoulder       ED Disposition     ED Disposition Condition Date/Time Comment    Discharge Stable Wed Feb 17, 2021  2:00 PM Eva Llanos 284 discharge to home/self care              Follow-up Information     Follow up With Specialties Details Why Janice Rodriguez MD Internal Medicine Schedule an appointment as soon as possible for a visit  Return to ED sooner if symptoms worsen or persist  [address] [place] [address]  [phone]            Discharge Medication List as of 2/17/2021  2:03 PM      START taking these medications    Details   predniSONE 20 mg tablet Multiple Dosages:Starting Wed 2/17/2021, Last dose on Wed 2/17/2021, THEN Starting Thu 2/18/2021, Last dose on Fri 2/19/2021, THEN Starting Sat 2/20/2021, Last dose on Sun 2/21/2021, THEN Starting Mon 2/22/2021, Last dose on Tue 2/23/2021Take 3 tabl ets (60 mg total) by mouth daily for 1 day, THEN 2 tablets (40 mg total) daily for 2 days, THEN 1 tablet (20 mg total) daily for 2 days, THEN 0 5 tablets (10 mg total) daily for 2 days  , Normal         CONTINUE these medications which have NOT CHANGED    Details   allopurinol (ZYLOPRIM) 300 mg tablet Take 1 tablet (300 mg total) by mouth daily, Starting Thu 7/11/2019, Normal      amLODIPine (NORVASC) 2 5 mg tablet TAKE ONE TABLET BY MOUTH DAILY AS DIRECTED, Normal      !! atorvastatin (LIPITOR) 40 mg tablet TAKE ONE TABLET BY MOUTH DAILY, Normal      !! atorvastatin (LIPITOR) 40 mg tablet Take 1 tablet (40 mg total) by mouth daily, Starting Fri 6/5/2020, Normal      bismuth subsalicylate (PEPTO BISMOL) 524 mg/30 mL oral suspension Take 15 mL (262 mg total) by mouth every 6 (six) hours as needed for indigestion, Starting Thu 2/6/2020, Normal      cholecalciferol (VITAMIN D3) 1,000 units tablet TAKE ONE TABLET BY MOUTH DAILY, Normal      !! cyanocobalamin (VITAMIN B-12) 100 mcg tablet Take 2 5 tablets (250 mcg total) by mouth daily, Starting Thu 2/6/2020, Normal      fenofibrate (TRIGLIDE) 160 MG tablet TAKE ONE TABLET BY MOUTH DAILY, Normal      lisinopril (ZESTRIL) 30 mg tablet TAKE ONE TABLET BY MOUTH DAILY, Normal      omeprazole (PriLOSEC) 20 mg delayed release capsule TAKE TWO CAPSULES BY MOUTH DAILY, Normal      Pediatric Multivit-Minerals-C (FLINTSTONES GUMMIES) chewable tablet Chew 1 tablet daily, Starting Thu 2/6/2020, Normal      !! vitamin B-12 (CYANOCOBALAMIN) 100 MCG tablet Take 1 tablet (100 mcg total) by mouth daily, Starting Wed 1/22/2020, Normal       !! - Potential duplicate medications found  Please discuss with provider  No discharge procedures on file      PDMP Review     None          ED Provider  Electronically Signed by           Amie Alvarez DO  02/17/21 6465

## 2024-02-21 PROBLEM — Z12.11 SCREENING FOR COLON CANCER: Status: RESOLVED | Noted: 2018-03-22 | Resolved: 2024-02-21

## 2025-01-25 ENCOUNTER — HOSPITAL ENCOUNTER (EMERGENCY)
Facility: HOSPITAL | Age: 60
Discharge: HOME | End: 2025-01-25
Attending: EMERGENCY MEDICINE | Admitting: EMERGENCY MEDICINE
Payer: MEDICARE

## 2025-01-25 VITALS
DIASTOLIC BLOOD PRESSURE: 98 MMHG | SYSTOLIC BLOOD PRESSURE: 150 MMHG | TEMPERATURE: 100 F | HEART RATE: 95 BPM | RESPIRATION RATE: 18 BRPM | OXYGEN SATURATION: 98 %

## 2025-01-25 DIAGNOSIS — M25.462 EFFUSION OF LEFT KNEE: Primary | ICD-10-CM

## 2025-01-25 DIAGNOSIS — M10.9 ACUTE GOUT OF LEFT KNEE, UNSPECIFIED CAUSE: ICD-10-CM

## 2025-01-25 LAB
APPEARANCE FLD: ABNORMAL
COLOR FLD: YELLOW
CRYSTALS SNV QL MICRO: NORMAL
HISTIOCYTES NFR SNV MANUAL: 5 %
NEUTROPHILS NFR SNV MANUAL: 95 %
SITE: ABNORMAL
TOTAL CELLS COUNTED SPEC: 100
WBC # FLD MANUAL: ABNORMAL /UL

## 2025-01-25 PROCEDURE — 20610 DRAIN/INJ JOINT/BURSA W/O US: CPT | Performed by: EMERGENCY MEDICINE

## 2025-01-25 PROCEDURE — 87205 SMEAR GRAM STAIN: CPT | Performed by: EMERGENCY MEDICINE

## 2025-01-25 PROCEDURE — 89051 BODY FLUID CELL COUNT: CPT | Performed by: EMERGENCY MEDICINE

## 2025-01-25 PROCEDURE — 87070 CULTURE OTHR SPECIMN AEROBIC: CPT | Performed by: EMERGENCY MEDICINE

## 2025-01-25 PROCEDURE — 99283 EMERGENCY DEPT VISIT LOW MDM: CPT

## 2025-01-25 PROCEDURE — 99284 EMERGENCY DEPT VISIT MOD MDM: CPT | Performed by: EMERGENCY MEDICINE

## 2025-01-25 PROCEDURE — 89060 EXAM SYNOVIAL FLUID CRYSTALS: CPT | Performed by: EMERGENCY MEDICINE

## 2025-01-25 RX ORDER — LIDOCAINE HYDROCHLORIDE 10 MG/ML
5 INJECTION, SOLUTION EPIDURAL; INFILTRATION; INTRACAUDAL; PERINEURAL ONCE
Status: DISCONTINUED | OUTPATIENT
Start: 2025-01-25 | End: 2025-01-25

## 2025-01-25 RX ORDER — LIDOCAINE HYDROCHLORIDE 10 MG/ML
10 INJECTION, SOLUTION EPIDURAL; INFILTRATION; INTRACAUDAL; PERINEURAL ONCE
Status: COMPLETED | OUTPATIENT
Start: 2025-01-25 | End: 2025-01-25

## 2025-01-25 RX ADMIN — LIDOCAINE HYDROCHLORIDE 10 ML: 10 INJECTION, SOLUTION EPIDURAL; INFILTRATION; INTRACAUDAL at 17:49

## 2025-01-25 NOTE — DISCHARGE INSTRUCTIONS
You may take 650mg of Tylenol every four to six hours, not exceeding 3,000mg daily, for the management of your discomfort. You may also take Ibuprofen, 400mg every six to eight hours.

## 2025-01-26 NOTE — ED ATTENDING ATTESTATION
1/25/2025  I, Luke Bellamy DO, saw and evaluated the patient. I have discussed the patient with the resident/non-physician practitioner and agree with the resident's/non-physician practitioner's findings, Plan of Care, and MDM as documented in the resident's/non-physician practitioner's note, except where noted. All available labs and Radiology studies were reviewed.  I was present for key portions of any procedure(s) performed by the resident/non-physician practitioner and I was immediately available to provide assistance.       At this point I agree with the current assessment done in the Emergency Department.  I have conducted an independent evaluation of this patient a history and physical is as follows:        1. Effusion of left knee    2. Acute gout of left knee, unspecified cause            MDM  Number of Diagnoses or Management Options  Acute gout of left knee, unspecified cause  Effusion of left knee  Diagnosis management comments:       Initial ED assessment:   59-year-old male, swelling and effusion of the left knee, history of gout.  No fevers no chills    Pathology at risk for includes but is not limited to:   Joint effusion, gout, less likely infection although he does have a low-grade fever here.    Initial ED plan:   Joint aspiration, will send for culture, Gram stain, crystal analysis        Final ED summary/disposition:   After evaluation and workup in the emergency department, was 50 cc of fluid aspirated from patient's joint he feels greatly improved greatly improved range of motion of the knee, ultimately left prior to lab processing the joint fluid, as he has been here for almost 7 hours and wanted to go home understands risk benefits and alternatives, will call with significant abnormalities.               Time reflects when diagnosis was documented in both MDM as applicable and the Disposition within this note       Time User Action Codes Description Comment    1/25/2025  6:16 PM Stef  Génesis Add [M25.462] Effusion of left knee     1/25/2025  7:39 PM Luke Bellamy Add [M10.9] Acute gout of left knee, unspecified cause           ED Disposition       ED Disposition   Discharge    Condition   Stable    Date/Time   Sat Jan 25, 2025  7:39 PM    Comment   Rico Singhz discharge to home/self care.                   Follow-up Information       Follow up With Specialties Details Why Contact Info Additional Information    Jay Juarez MD Internal Medicine In 2 days  [address]  [address]  [facility] [address]  [phone]       [facility] [place] [facility]  if you do not hear back from ortho in 1 week [address]  [address]  [facility] [address] [phone] [facility] [place], [facility] [address], [address], [place], [address] [phone]                          Chief Complaint   Patient presents with    Knee Pain     Pt presents to the ED with left knee pain and swelling with difficulty bearing weight over the last few weeks.              59-year-old male, left knee swelling.,  Works as a , has had this in the past, long history of gout.,  No fevers no chills no history of IVDA.      Knee Pain                Visit Vitals  /98 (BP Location: Right arm)   Pulse 95   Temp 100 °F (37.8 °C) (Oral)   Resp 18   SpO2 98%   Smoking Status Never        Physical Exam  Vitals reviewed.   Constitutional:       General: He is not in acute distress.     Appearance: He is well-developed. He is not diaphoretic.   HENT:      Head: Normocephalic and atraumatic.      Right Ear: External ear normal.      Left Ear: External ear normal.      Nose: Nose normal.   Eyes:      General:         Right eye: No discharge.         Left eye: No discharge.      Pupils: Pupils are equal, round, and reactive to light.   Neck:      Trachea: No tracheal deviation.   Cardiovascular:      Rate and Rhythm: Normal rate and regular  rhythm.      Heart sounds: Normal heart sounds. No murmur heard.  Pulmonary:      Effort: Pulmonary effort is normal. No respiratory distress.      Breath sounds: Normal breath sounds. No stridor.   Abdominal:      General: There is no distension.      Palpations: Abdomen is soft.      Tenderness: There is no abdominal tenderness. There is no guarding or rebound.   Musculoskeletal:         General: Normal range of motion.      Cervical back: Normal range of motion and neck supple.      Comments: Left knee joint effusion.  No overlying erythema, most swelling is superomedial and superolateral   Skin:     General: Skin is warm and dry.      Coloration: Skin is not pale.      Findings: No erythema.   Neurological:      General: No focal deficit present.      Mental Status: He is alert and oriented to person, place, and time.               ED Course as of 01/25/25 1954   Sat Jan 25, 2025 1938 Repeat exam patient feels improved, discussed about lab not resulting results yet, patient states he has been here for 6 and half hours wants to be discharged, feels greatly improved after joint aspiration, I do think this is gout.  He has medication for it at home, feels like he wants to just be discharged.,  Understands my recommendation is to wait for entirety of labs, does not want a wait for this will DC          Medications   lidocaine (PF) (XYLOCAINE-MPF) 1 % injection 10 mL (10 mL Infiltration Given 1/25/25 1749)             Labs Reviewed - No data to display      No orders to display                  Procedures

## 2025-01-26 NOTE — ED PROVIDER NOTES
Time reflects when diagnosis was documented in both MDM as applicable and the Disposition within this note       Time User Action Codes Description Comment    1/25/2025  6:16 PM Génesis Finch Add [M25.462] Effusion of left knee     1/25/2025  7:39 PM Luke Bellamy Add [M10.9] Acute gout of left knee, unspecified cause           ED Disposition       ED Disposition   Discharge    Condition   Stable    Date/Time   Sat Jan 25, 2025  7:39 PM    Comment   Rico Chauhan discharge to home/self care.                   Assessment & Plan       Medical Decision Making  Amount and/or Complexity of Data Reviewed  Labs: ordered.    Risk  Prescription drug management.      Patient is a 59-year-old male with a history of gout presenting with left knee effusion with pain and increased warmth that has been progressively worsening.  Knee was aspirated with 40 cc of cloudy yellow fluid produce and sent to the lab.  While waiting for the lab results, patient requested to go home and did not want to wait for results.  Reports pain is much improved after aspiration.  He does have a history of gout, and has been on medications at home.  Suspect his pain is likely due to gout.  Instructed patient to follow-up with PCP and Ortho for further management.  He states he has an Ortho appointment scheduled in late February, but Ortho referral placed.  Patient voices understanding and agrees with plan.  All questions and concerns addressed at this time.       Medications   lidocaine (PF) (XYLOCAINE-MPF) 1 % injection 10 mL (10 mL Infiltration Given 1/25/25 1749)       ED Risk Strat Scores                                              History of Present Illness       Chief Complaint   Patient presents with    Knee Pain     Pt presents to the ED with left knee pain and swelling with difficulty bearing weight over the last few weeks.        Past Medical History:   Diagnosis Date    Arthritis     GERD (gastroesophageal reflux disease)     Gout      Gout     Hyperbilirubinemia 2/3/2019    Hypercholesteremia     Hypertension     Low back pain       Past Surgical History:   Procedure Laterality Date    COLONOSCOPY      LAPAROSCOPIC GASTRIC BANDING      NC ESOPHAGOGASTRODUODENOSCOPY TRANSORAL DIAGNOSTIC N/A 7/10/2018    Procedure: EGD AND COLONOSCOPY;  Surgeon: Garret Mejia MD;  Location: [facility];  Service: Gastroenterology      Family History   Problem Relation Age of Onset    Diabetes Mother     Seizures Mother     No Known Problems Father     No Known Problems Family       Social History     Tobacco Use    Smoking status: Never    Smokeless tobacco: Never   Substance Use Topics    Alcohol use: Yes     Comment: occasionally / social     Drug use: No      E-Cigarette/Vaping      E-Cigarette/Vaping Substances      I have reviewed and agree with the history as documented.     HPI  Patient is a 59-year-old male with a history of gout presenting with left knee pain and swelling over the last few weeks.  Pain is progressively worsened, and he reports he is having difficulty bearing weight.  Has been taking Tylenol and Motrin without relief.  He has a history of needing arthrocentesis on right knee due to gout.  Patient reports he does work as a , and kneels on his knees frequently.  No history of diabetes.    Review of Systems   Constitutional:  Negative for activity change, fever and unexpected weight change.   Respiratory:  Negative for cough, chest tightness and shortness of breath.    Cardiovascular:  Negative for chest pain and palpitations.   Gastrointestinal:  Negative for abdominal pain, diarrhea, nausea and vomiting.   Genitourinary:  Negative for dysuria and hematuria.   Musculoskeletal:  Positive for arthralgias (L knee).   Skin:  Negative for wound.   Allergic/Immunologic: Negative for immunocompromised state.   Neurological:  Negative for syncope.   All other systems reviewed and are negative.          Objective       ED Triage Vitals  [01/25/25 1334]   Temperature Pulse Blood Pressure Respirations SpO2 Patient Position - Orthostatic VS   100 °F (37.8 °C) 95 150/98 18 98 % Sitting      Temp Source Heart Rate Source BP Location FiO2 (%) Pain Score    Oral Monitor Right arm -- --      Vitals      Date and Time Temp Pulse SpO2 Resp BP Pain Score FACES Pain Rating User   01/25/25 1334 100 °F (37.8 °C) 95 98 % 18 150/98 -- -- AW            Physical Exam  Vitals and nursing note reviewed.   Constitutional:       General: He is not in acute distress.     Appearance: Normal appearance. He is not ill-appearing.   HENT:      Head: Normocephalic and atraumatic.      Nose: Nose normal.   Eyes:      Extraocular Movements: Extraocular movements intact.      Conjunctiva/sclera: Conjunctivae normal.   Cardiovascular:      Rate and Rhythm: Normal rate and regular rhythm.      Pulses: Normal pulses.      Heart sounds: No murmur heard.     No friction rub. No gallop.   Pulmonary:      Effort: Pulmonary effort is normal. No respiratory distress.      Breath sounds: Normal breath sounds.   Abdominal:      General: Bowel sounds are normal.      Palpations: Abdomen is soft.      Tenderness: There is no abdominal tenderness. There is no guarding or rebound.   Musculoskeletal:         General: Normal range of motion.      Cervical back: Normal range of motion. No rigidity or tenderness.      Comments: Edema, tenderness, increased warmth to the left knee.  Tenderness is in the medial aspect of the left knee.  Edema superiorly to patella range of motion limited secondary to pain, sensation intact DP pulses 2+ and sensation equal bilaterally   Skin:     General: Skin is warm and dry.      Capillary Refill: Capillary refill takes less than 2 seconds.   Neurological:      Mental Status: He is alert and oriented to person, place, and time.      Cranial Nerves: No cranial nerve deficit.      Sensory: No sensory deficit.      Motor: No weakness.         Results Reviewed        "Procedure Component Value Units Date/Time    Synovial fluid, crystal [[id number]] Collected: 01/25/25 1824    Lab Status: Final result Specimen: Synovial Fluid Updated: 01/25/25 2253     Crystals, Synovial Fluid Positive for Monosodium Urate Crystals    Synovial Fluid Diff [[id number]] Collected: 01/25/25 1824    Lab Status: Final result Specimen: Synovial Fluid Updated: 01/25/25 2053     Total Counted 100     Neutrophil % Synovial 95 %      Histiocyte % Synovial 5 %     Synovial fluid, white cell count w/ diff [[id number]]  (Abnormal) Collected: 01/25/25 1824    Lab Status: Final result Specimen: Synovial Fluid Updated: 01/25/25 2051     Site left knee     Color, Fluid Yellow     Clarity, Fluid Cloudy     WBC, Fluid 34,260 /ul     Body fluid culture and Gram stain [[id number]] Collected: 01/25/25 1824    Lab Status: In process Specimen: Synovial Fluid from Joint, Left Knee Updated: 01/25/25 1824            No orders to display       Arthrocentesis    Date/Time: 1/25/2025 6:00 PM    Performed by: Génesis Finch MD  Authorized by: Génesis Finch MD  Universal Protocol:  Procedure performed by: (Dr. Oswald)  Risks and benefits: risks, benefits and alternatives were discussed  Consent given by: patient  Time out: Immediately prior to procedure a \"time out\" was called to verify the correct patient, procedure, equipment, support staff and site/side marked as required.  Patient understanding: patient states understanding of the procedure being performed  Patient consent: the patient's understanding of the procedure matches consent given  Procedure consent: procedure consent matches procedure scheduled  Relevant documents: relevant documents present and verified  Test results: test results available and properly labeled  Site marked: the operative site was marked  Radiology Images displayed and confirmed. If images not available, report reviewed: imaging studies available  Required items: required blood products, implants, " devices, and special equipment available  Patient identity confirmed: verbally with patient    Location:  Bedside  Indications:  Joint swelling and pain  Body area:  Knee  Joint:  Left knee  Local anesthesia used?: Yes    Anesthesia:  Local infiltration  Local anesthetic:  Lidocaine 1% without epinephrine  Anesthetic total (ml):  5  Preparation: Patient was prepped and draped in usual sterile fashion    Needle size:  18 G  Ultrasound guidance: No    Approach:  Superior  Aspirate amount (ml):  40  Aspirate:  Cloudy and yellow  Patient tolerance:  Patient tolerated the procedure well with no immediate complications      ED Medication and Procedure Management   Prior to Admission Medications   Prescriptions Last Dose Informant Patient Reported? Taking?   Pediatric Multivit-Minerals-C (FLINTSTONES GUMMIES) chewable tablet   No No   Sig: Chew 1 tablet daily   Patient not taking: Reported on 12/24/2020   allopurinol (ZYLOPRIM) 300 mg tablet  Self No No   Sig: Take 1 tablet (300 mg total) by mouth daily   Patient not taking: Reported on 12/24/2020   amLODIPine (NORVASC) 2.5 mg tablet   No No   Sig: TAKE ONE TABLET BY MOUTH DAILY AS DIRECTED   Patient not taking: Reported on 12/24/2020   atorvastatin (LIPITOR) 40 mg tablet   No No   Sig: TAKE ONE TABLET BY MOUTH DAILY   Patient not taking: Reported on 12/24/2020   atorvastatin (LIPITOR) 40 mg tablet   No No   Sig: Take 1 tablet (40 mg total) by mouth daily   Patient not taking: Reported on 12/24/2020   bismuth subsalicylate (PEPTO BISMOL) 524 mg/30 mL oral suspension   No No   Sig: Take 15 mL (262 mg total) by mouth every 6 (six) hours as needed for indigestion   Patient not taking: Reported on 12/24/2020   cholecalciferol (VITAMIN D3) 1,000 units tablet   No No   Sig: TAKE ONE TABLET BY MOUTH DAILY   Patient not taking: Reported on 12/24/2020   cyanocobalamin (VITAMIN B-12) 100 mcg tablet   No No   Sig: Take 2.5 tablets (250 mcg total) by mouth daily   Patient not taking:  Reported on 12/24/2020   fenofibrate (TRIGLIDE) 160 MG tablet  Self No No   Sig: TAKE ONE TABLET BY MOUTH DAILY   Patient not taking: Reported on 12/24/2020   lisinopril (ZESTRIL) 30 mg tablet   No No   Sig: TAKE ONE TABLET BY MOUTH DAILY   Patient not taking: Reported on 12/24/2020   omeprazole (PriLOSEC) 20 mg delayed release capsule  Self No No   Sig: TAKE TWO CAPSULES BY MOUTH DAILY   Patient not taking: Reported on 12/24/2020   vitamin B-12 (CYANOCOBALAMIN) 100 MCG tablet  Self No No   Sig: Take 1 tablet (100 mcg total) by mouth daily   Patient not taking: Reported on 12/24/2020      Facility-Administered Medications: None     Discharge Medication List as of 1/25/2025  7:39 PM        CONTINUE these medications which have NOT CHANGED    Details   allopurinol (ZYLOPRIM) 300 mg tablet Take 1 tablet (300 mg total) by mouth daily, Starting Thu 7/11/2019, Normal      amLODIPine (NORVASC) 2.5 mg tablet TAKE ONE TABLET BY MOUTH DAILY AS DIRECTED, Normal      !! atorvastatin (LIPITOR) 40 mg tablet TAKE ONE TABLET BY MOUTH DAILY, Normal      !! atorvastatin (LIPITOR) 40 mg tablet Take 1 tablet (40 mg total) by mouth daily, Starting Fri 6/5/2020, Normal      bismuth subsalicylate (PEPTO BISMOL) 524 mg/30 mL oral suspension Take 15 mL (262 mg total) by mouth every 6 (six) hours as needed for indigestion, Starting Thu 2/6/2020, Normal      cholecalciferol (VITAMIN D3) 1,000 units tablet TAKE ONE TABLET BY MOUTH DAILY, Normal      !! cyanocobalamin (VITAMIN B-12) 100 mcg tablet Take 2.5 tablets (250 mcg total) by mouth daily, Starting Thu 2/6/2020, Normal      fenofibrate (TRIGLIDE) 160 MG tablet TAKE ONE TABLET BY MOUTH DAILY, Normal      lisinopril (ZESTRIL) 30 mg tablet TAKE ONE TABLET BY MOUTH DAILY, Normal      omeprazole (PriLOSEC) 20 mg delayed release capsule TAKE TWO CAPSULES BY MOUTH DAILY, Normal      Pediatric Multivit-Minerals-C (FLINTSTONES GUMMIES) chewable tablet Chew 1 tablet daily, Starting Thu 2/6/2020,  Normal      !! vitamin B-12 (CYANOCOBALAMIN) 100 MCG tablet Take 1 tablet (100 mcg total) by mouth daily, Starting Wed 1/22/2020, Normal       !! - Potential duplicate medications found. Please discuss with provider.          ED SEPSIS DOCUMENTATION   Time reflects when diagnosis was documented in both MDM as applicable and the Disposition within this note       Time User Action Codes Description Comment    1/25/2025  6:16 PM Génesis Finch Add [M25.462] Effusion of left knee     1/25/2025  7:39 PM Luke Bellamy [M10.9] Acute gout of left knee, unspecified cause                  Génesis Finch MD  01/26/25 5036

## 2025-01-29 LAB
BACTERIA SPEC BFLD CULT: NO GROWTH
GRAM STN SPEC: NORMAL
GRAM STN SPEC: NORMAL